# Patient Record
Sex: FEMALE | Race: WHITE | NOT HISPANIC OR LATINO | Employment: OTHER | URBAN - METROPOLITAN AREA
[De-identification: names, ages, dates, MRNs, and addresses within clinical notes are randomized per-mention and may not be internally consistent; named-entity substitution may affect disease eponyms.]

---

## 2017-01-10 ENCOUNTER — MYC MEDICAL ADVICE (OUTPATIENT)
Dept: FAMILY MEDICINE | Facility: CLINIC | Age: 52
End: 2017-01-10

## 2017-01-10 DIAGNOSIS — N95.0 POST-MENOPAUSAL BLEEDING: Primary | ICD-10-CM

## 2017-01-16 ENCOUNTER — HOSPITAL ENCOUNTER (OUTPATIENT)
Dept: ULTRASOUND IMAGING | Facility: CLINIC | Age: 52
Discharge: HOME | End: 2017-01-16
Attending: FAMILY MEDICINE | Admitting: FAMILY MEDICINE
Payer: COMMERCIAL

## 2017-01-16 DIAGNOSIS — N95.0 POST-MENOPAUSAL BLEEDING: ICD-10-CM

## 2017-01-16 PROCEDURE — 76830 TRANSVAGINAL US NON-OB: CPT

## 2017-01-30 ENCOUNTER — OFFICE VISIT (OUTPATIENT)
Dept: FAMILY MEDICINE | Facility: CLINIC | Age: 52
End: 2017-01-30
Payer: COMMERCIAL

## 2017-01-30 VITALS
HEIGHT: 65 IN | BODY MASS INDEX: 31.46 KG/M2 | DIASTOLIC BLOOD PRESSURE: 64 MMHG | WEIGHT: 188.8 LBS | HEART RATE: 80 BPM | SYSTOLIC BLOOD PRESSURE: 112 MMHG | TEMPERATURE: 98.1 F

## 2017-01-30 DIAGNOSIS — N95.0 POST-MENOPAUSE BLEEDING: Primary | ICD-10-CM

## 2017-01-30 PROCEDURE — 99214 OFFICE O/P EST MOD 30 MIN: CPT | Performed by: FAMILY MEDICINE

## 2017-01-30 NOTE — PROGRESS NOTES
"  SUBJECTIVE:                                                    Shaista Villar is a 51 year old female who presents to clinic today for the following health issues:      Vaginal Bleeding (Dysmenorrhea)      Onset: 1/9/2017    Description:  Duration of bleeding episodes: 1  Frequency between periods:  1 year  Describe bleeding/flow:   Clots: no  Number of pads/hour: normal period  Cramping: moderate    Intensity:  moderate    Accompanying signs and symptoms: heavy bleeding, lasted 5 days    History (similar episodes/previous evaluation): US completed    Precipitating or alleviating factors: None    Therapies tried and outcome: None    Had period after no period for almost one year. The period was normal and not heavy she did have US that was normal     Discussed EMB               Problem list and histories reviewed & adjusted, as indicated.  Additional history: as documented    Problem list, Medication list, Allergies, and Medical/Social/Surgical histories reviewed in EPIC and updated as appropriate.    ROS:  Constitutional, HEENT, cardiovascular, pulmonary, gi and gu systems are negative, except as otherwise noted.    OBJECTIVE:                                                    /64 mmHg  Pulse 80  Temp(Src) 98.1  F (36.7  C) (Tympanic)  Ht 5' 4.75\" (1.645 m)  Wt 188 lb 12.8 oz (85.639 kg)  BMI 31.65 kg/m2  LMP 03/25/2016  Breastfeeding? No  Body mass index is 31.65 kg/(m^2).  GENERAL APPEARANCE: healthy, alert and no distress   (female): normal cervix, adnexae, and uterus without masses or discharge and attempted to do EMB And cervical os stenotic   PSYCH: mentation appears normal and affect normal/bright         ASSESSMENT/PLAN:                                                    1. Post-menopause bleeding    - US Pelvic Complete w Transvaginal; Future    Long discussion regarding EMB or not and will instead watch and she will repeat US in 3 months but if any pain or more bleeding she will let me " know     Patient Instructions   Set up an US in April     Let me know if any bleeding between now and then    You may have some spotting today and that would be fine       Risks, benefits, side effects and rationale for treatment plan fully discussed with the patient and understanding expressed.   Deanna Cagle MD  [facility]

## 2017-01-30 NOTE — NURSING NOTE
"Chief Complaint   Patient presents with     Abnormal Uterine Bleeding       Initial /64 mmHg  Pulse 80  Temp(Src) 98.1  F (36.7  C) (Tympanic)  Ht 5' 4.75\" (1.645 m)  Wt 188 lb 12.8 oz (85.639 kg)  BMI 31.65 kg/m2  LMP 03/25/2016  Breastfeeding? No Estimated body mass index is 31.65 kg/(m^2) as calculated from the following:    Height as of this encounter: 5' 4.75\" (1.645 m).    Weight as of this encounter: 188 lb 12.8 oz (85.639 kg).  BP completed using cuff size: large    "

## 2017-01-30 NOTE — PATIENT INSTRUCTIONS
Set up an US in April     Let me know if any bleeding between now and then    You may have some spotting today and that would be fine

## 2017-01-30 NOTE — MR AVS SNAPSHOT
"              After Visit Summary   1/30/2017    Shaista Villar    MRN: [id number]           Patient Information     Date Of Birth          1965        Visit Information        Provider Department      1/30/2017 9:00 AM Deanna Cagle MD [facility]        Care Instructions    Set up an US in April     Let me know if any bleeding between now and then    You may have some spotting today and that would be fine         Follow-ups after your visit        Who to contact     If you have questions or need follow up information about today's clinic visit or your schedule please contact [facility] directly at [phone].  Normal or non-critical lab and imaging results will be communicated to you by MyChart, letter or phone within 4 business days after the clinic has received the results. If you do not hear from us within 7 days, please contact the clinic through Yugmahart or phone. If you have a critical or abnormal lab result, we will notify you by phone as soon as possible.  Submit refill requests through [email or web address] or call your pharmacy and they will forward the refill request to us. Please allow 3 business days for your refill to be completed.          Additional Information About Your Visit        MyChart Information     [email or web address] gives you secure access to your electronic health record. If you see a primary care provider, you can also send messages to your care team and make appointments. If you have questions, please call your primary care clinic.  If you do not have a primary care provider, please call [phone] and they will assist you.        Care EveryWhere ID     This is your Care EveryWhere ID. This could be used by other organizations to access your [facility] medical records  [id number]        Your Vitals Were     Pulse Temperature Height BMI (Body Mass Index) Last Period Breastfeeding?    80 98.1  F (36.7  C) (Tympanic) 5' 4.75\" (1.645 m) 31.65 kg/m2 03/25/2016 " No       Blood Pressure from Last 3 Encounters:   01/30/17 112/64   06/27/16 138/87   03/29/16 147/97    Weight from Last 3 Encounters:   01/30/17 188 lb 12.8 oz (85.639 kg)   06/27/16 181 lb (82.101 kg)   03/29/16 185 lb 9.6 oz (84.188 kg)              Today, you had the following     No orders found for display       Primary Care Provider Office Phone # Fax #    Deanna Cagle [phone]       [facility] [address] [facility] [address]        Thank you!     Thank you for choosing [facility]  for your care. Our goal is always to provide you with excellent care. Hearing back from our patients is one way we can continue to improve our services. Please take a few minutes to complete the written survey that you may receive in the mail after your visit with us. Thank you!             Your Updated Medication List - Protect others around you: Learn how to safely use, store and throw away your medicines at www.disposemymeds.org.          This list is accurate as of: 1/30/17 10:22 AM.  Always use your most recent med list.                   Brand Name Dispense Instructions for use    albuterol 108 (90 BASE) MCG/ACT Inhaler    PROAIR HFA/PROVENTIL HFA/VENTOLIN HFA    3 Inhaler    Inhale 2 puffs into the lungs every 4 hours as needed for shortness of breath / dyspnea       calcium-magnesium 500-250 MG Tabs per tablet    CALMAG     2 TABLETS DAILY       DHA-EPA-Coenzyme Q10-Vitamin E 476-117-57-30 Caps      Take  by mouth.       MULTIVITAMIN PO      one daily       vitamin B complex with vitamin C Tabs tablet    STRESS TAB     one daily       Vitamin C 100 MG Tabs      prn       Vitamin D (Cholecalciferol) 1000 UNITS Tabs      Take 1 tablet by mouth daily       Zinc 10 MG Tabs      prn       ZYRTEC PO      Take  by mouth.

## 2017-04-10 ENCOUNTER — HOSPITAL ENCOUNTER (OUTPATIENT)
Dept: ULTRASOUND IMAGING | Facility: CLINIC | Age: 52
Discharge: HOME | End: 2017-04-10
Attending: FAMILY MEDICINE | Admitting: FAMILY MEDICINE
Payer: COMMERCIAL

## 2017-04-10 DIAGNOSIS — N95.0 POST-MENOPAUSE BLEEDING: ICD-10-CM

## 2017-04-10 PROCEDURE — 76830 TRANSVAGINAL US NON-OB: CPT

## 2017-04-11 ENCOUNTER — MYC MEDICAL ADVICE (OUTPATIENT)
Dept: FAMILY MEDICINE | Facility: CLINIC | Age: 52
End: 2017-04-11

## 2017-07-24 ENCOUNTER — OFFICE VISIT (OUTPATIENT)
Dept: FAMILY MEDICINE | Facility: CLINIC | Age: 52
End: 2017-07-24
Payer: COMMERCIAL

## 2017-07-24 VITALS
BODY MASS INDEX: 29.99 KG/M2 | TEMPERATURE: 98.4 F | HEART RATE: 76 BPM | SYSTOLIC BLOOD PRESSURE: 134 MMHG | WEIGHT: 180 LBS | DIASTOLIC BLOOD PRESSURE: 84 MMHG | HEIGHT: 65 IN

## 2017-07-24 DIAGNOSIS — J45.20 MILD INTERMITTENT ASTHMA WITHOUT COMPLICATION: ICD-10-CM

## 2017-07-24 DIAGNOSIS — Z00.00 ENCOUNTER FOR WELL ADULT EXAM WITHOUT ABNORMAL FINDINGS: Primary | ICD-10-CM

## 2017-07-24 DIAGNOSIS — J30.2 CHRONIC SEASONAL ALLERGIC RHINITIS DUE TO OTHER ALLERGEN: ICD-10-CM

## 2017-07-24 LAB
ANION GAP SERPL CALCULATED.3IONS-SCNC: 5 MMOL/L (ref 3–14)
BUN SERPL-MCNC: 11 MG/DL (ref 7–30)
CALCIUM SERPL-MCNC: 9.2 MG/DL (ref 8.5–10.1)
CHLORIDE SERPL-SCNC: 101 MMOL/L (ref 94–109)
CHOLEST SERPL-MCNC: 204 MG/DL
CO2 SERPL-SCNC: 29 MMOL/L (ref 20–32)
CREAT SERPL-MCNC: 0.67 MG/DL (ref 0.52–1.04)
GFR SERPL CREATININE-BSD FRML MDRD: NORMAL ML/MIN/1.7M2
GLUCOSE SERPL-MCNC: 85 MG/DL (ref 70–99)
HDLC SERPL-MCNC: 84 MG/DL
LDLC SERPL CALC-MCNC: 110 MG/DL
NONHDLC SERPL-MCNC: 120 MG/DL
POTASSIUM SERPL-SCNC: 4.2 MMOL/L (ref 3.4–5.3)
SODIUM SERPL-SCNC: 135 MMOL/L (ref 133–144)
TRIGL SERPL-MCNC: 49 MG/DL

## 2017-07-24 PROCEDURE — 36415 COLL VENOUS BLD VENIPUNCTURE: CPT | Performed by: FAMILY MEDICINE

## 2017-07-24 PROCEDURE — 80061 LIPID PANEL: CPT | Performed by: FAMILY MEDICINE

## 2017-07-24 PROCEDURE — 80048 BASIC METABOLIC PNL TOTAL CA: CPT | Performed by: FAMILY MEDICINE

## 2017-07-24 PROCEDURE — 99396 PREV VISIT EST AGE 40-64: CPT | Performed by: FAMILY MEDICINE

## 2017-07-24 RX ORDER — CETIRIZINE HYDROCHLORIDE, PSEUDOEPHEDRINE HYDROCHLORIDE 5; 120 MG/1; MG/1
1 TABLET, FILM COATED, EXTENDED RELEASE ORAL DAILY
COMMUNITY
End: 2020-02-24

## 2017-07-24 RX ORDER — ALBUTEROL SULFATE 90 UG/1
2 AEROSOL, METERED RESPIRATORY (INHALATION) EVERY 4 HOURS PRN
Qty: 3 INHALER | Refills: 1 | Status: SHIPPED | OUTPATIENT
Start: 2017-07-24 | End: 2018-07-20

## 2017-07-24 NOTE — MR AVS SNAPSHOT
After Visit Summary   7/24/2017    Shaista Villar    MRN: [id number]           Patient Information     Date Of Birth          1965        Visit Information        Provider Department      7/24/2017 8:40 AM Deanna Cagle MD [facility]        Today's Diagnoses     Encounter for well adult exam without abnormal findings    -  1    Chronic seasonal allergic rhinitis due to other allergen        Mild intermittent asthma without complication          Care Instructions      Preventive Health Recommendations  Female Ages 50 - 64    Yearly exam: See your health care provider every year in order to  o Review health changes.   o Discuss preventive care.    o Review your medicines if your doctor has prescribed any.      Get a Pap test every three years (unless you have an abnormal result and your provider advises testing more often).    If you get Pap tests with HPV test, you only need to test every 5 years, unless you have an abnormal result.     You do not need a Pap test if your uterus was removed (hysterectomy) and you have not had cancer.    You should be tested each year for STDs (sexually transmitted diseases) if you're at risk.     Have a mammogram every 1 to 2 years.    Have a colonoscopy at age 50, or have a yearly FIT test (stool test). These exams screen for colon cancer.      Have a cholesterol test every 5 years, or more often if advised.    Have a diabetes test (fasting glucose) every three years. If you are at risk for diabetes, you should have this test more often.     If you are at risk for osteoporosis (brittle bone disease), think about having a bone density scan (DEXA).    Shots: Get a flu shot each year. Get a tetanus shot every 10 years.    Nutrition:     Eat at least 5 servings of fruits and vegetables each day.    Eat whole-grain bread, whole-wheat pasta and brown rice instead of white grains and rice.    Talk to your provider about Calcium and Vitamin D.  "    Lifestyle    Exercise at least 150 minutes a week (30 minutes a day, 5 days a week). This will help you control your weight and prevent disease.    Limit alcohol to one drink per day.    No smoking.     Wear sunscreen to prevent skin cancer.     See your dentist every six months for an exam and cleaning.    See your eye doctor every 1 to 2 years.    Labs today     Consider ASA baby if cholesterol is up     See me back in one year     Albuterol refilled             Follow-ups after your visit        Your next 10 appointments already scheduled     Aug 02, 2017  8:00 AM CDT   MA SCREENING DIGITAL BILATERAL with W[facility]MA2   [facility] ([facility])    [address] [place]  [facility] [address]   [phone]           Do not use any powder, lotion or deodorant under your arms or on your breast. If you do, we will ask you to remove it before your exam.  Wear comfortable, two-piece clothing.  If you have any allergies, tell your care team.  Bring any previous mammograms from other facilities or have them mailed to the breast center. Three-dimensional (3D) mammograms are available at [place] locations in [facility], and [place]. [facility] locations include [facility] in [place]. Benefits of 3D mammograms include: - Improved rate of cancer detection - Decreases your chance of having to go back for more tests, which means fewer: - \"False-positive\" results (This means that there is an abnormal area but it isn't cancer.) - Invasive testing procedures, such as a biopsy or surgery - Can provide clearer images of the breast if you have dense breast tissue. 3D mammography is an optional exam that anyone can have with a 2D mammogram. It doesn't replace or take the place of a 2D mammogram. 2D mammograms remain an effective screening test for all women.  Not all insurance companies cover the cost of a 3D mammogram. Check with " "your insurance.              Who to contact     If you have questions or need follow up information about today's clinic visit or your schedule please contact [facility] directly at [phone].  Normal or non-critical lab and imaging results will be communicated to you by MyChart, letter or phone within 4 business days after the clinic has received the results. If you do not hear from us within 7 days, please contact the clinic through MyChart or phone. If you have a critical or abnormal lab result, we will notify you by phone as soon as possible.  Submit refill requests through [email or web address] or call your pharmacy and they will forward the refill request to us. Please allow 3 business days for your refill to be completed.          Additional Information About Your Visit        NCRhart Information     [email or web address] gives you secure access to your electronic health record. If you see a primary care provider, you can also send messages to your care team and make appointments. If you have questions, please call your primary care clinic.  If you do not have a primary care provider, please call [phone] and they will assist you.        Care EveryWhere ID     This is your Care EveryWhere ID. This could be used by other organizations to access your [facility] medical records  [id number]        Your Vitals Were     Pulse Temperature Height Last Period BMI (Body Mass Index)       76 98.4  F (36.9  C) (Tympanic) 5' 4.75\" (1.645 m) 03/25/2016 30.19 kg/m2        Blood Pressure from Last 3 Encounters:   07/24/17 134/84   01/30/17 112/64   06/27/16 138/87    Weight from Last 3 Encounters:   07/24/17 180 lb (81.6 kg)   01/30/17 188 lb 12.8 oz (85.6 kg)   06/27/16 181 lb (82.1 kg)              We Performed the Following     Asthma Action Plan (AAP)          Where to get your medicines      Some of these will need a paper prescription and others can be bought over the counter.  Ask your nurse if you have questions.     " Bring a paper prescription for each of these medications     albuterol 108 (90 BASE) MCG/ACT Inhaler          Primary Care Provider Office Phone # Fax #    Deanna Cagle [phone]       [facility] [address] [facility] [address]        Equal Access to Services     CLAUDE ARANDA : Hadii aad ku hadasho Soomaali, waaxda luqadaha, qaybta kaalmada adeegyada, fany sierra leobardon leann melendezferchochichi todd. So [facility] [phone].    ATENCIÓN: Si habla español, tiene a hernandez disposición servicios gratuitos de asistencia lingüística. [facility] [phone].    We comply with applicable federal civil rights laws and [place] laws. We do not discriminate on the basis of race, color, national origin, age, disability sex, sexual orientation or gender identity.            Thank you!     Thank you for choosing [facility]  for your care. Our goal is always to provide you with excellent care. Hearing back from our patients is one way we can continue to improve our services. Please take a few minutes to complete the written survey that you may receive in the mail after your visit with us. Thank you!             Your Updated Medication List - Protect others around you: Learn how to safely use, store and throw away your medicines at www.disposemymeds.org.          This list is accurate as of: 7/24/17  9:09 AM.  Always use your most recent med list.                   Brand Name Dispense Instructions for use Diagnosis    albuterol 108 (90 BASE) MCG/ACT Inhaler    PROAIR HFA/PROVENTIL HFA/VENTOLIN HFA    3 Inhaler    Inhale 2 puffs into the lungs every 4 hours as needed for shortness of breath / dyspnea    Mild intermittent asthma without complication       calcium-magnesium 500-250 MG Tabs per tablet    CALMAG     2 TABLETS DAILY        cetirizine-psuedoePHEDrine 5-120 MG per 12 hr tablet    [id number]     Take 1 tablet by mouth daily        DHA-EPA-Coenzyme Q10-Vitamin E  [id number] Caps      Take  by mouth.        MULTIVITAMIN PO      one daily        vitamin B complex with vitamin C Tabs tablet    STRESS TAB     one daily        Vitamin C 100 MG Tabs      prn        Vitamin D (Cholecalciferol) 1000 UNITS Tabs      Take 1 tablet by mouth daily        Zinc 10 MG Tabs      prn

## 2017-07-24 NOTE — NURSING NOTE
"Chief Complaint   Patient presents with     Physical       Initial /84 (BP Location: Right arm, Patient Position: Chair, Cuff Size: Adult Large)  Pulse 76  Temp 98.4  F (36.9  C) (Tympanic)  Ht 5' 4.75\" (1.645 m)  Wt 180 lb (81.6 kg)  LMP 03/25/2016  BMI 30.19 kg/m2 Estimated body mass index is 30.19 kg/(m^2) as calculated from the following:    Height as of this encounter: 5' 4.75\" (1.645 m).    Weight as of this encounter: 180 lb (81.6 kg).  Medication Reconciliation: complete    Health Maintenance that is potentially due pending provider review:  ACT    Possibly completing today per provider review.    Is there anyone who you would like to be able to receive your results? Yes  If yes have patient fill out ROSALINDA    "

## 2017-07-24 NOTE — PATIENT INSTRUCTIONS

## 2017-07-24 NOTE — LETTER
My Asthma Action Plan  Name: Shaista Villar   YOB: 1965  Date: 7/24/2017   My doctor: Deanna Cagle MD   My clinic: [facility]        My Control Medicine: none  My Rescue Medicine: Albuterol (Proair/Ventolin/Proventil) inhaler as needed   My Asthma Severity: intermittent  Avoid your asthma triggers              GREEN ZONE   Good Control    I feel good    No cough or wheeze    Can work, sleep and play without asthma symptoms       Take your asthma control medicine every day.     1. If exercise triggers your asthma, take your rescue medication    15 minutes before exercise or sports, and    During exercise if you have asthma symptoms  2. Spacer to use with inhaler: If you have a spacer, make sure to use it with your inhaler             YELLOW ZONE Getting Worse  I have ANY of these:    I do not feel good    Cough or wheeze    Chest feels tight    Wake up at night   1. Keep taking your Green Zone medications  2. Start taking your rescue medicine:    every 20 minutes for up to 1 hour. Then every 4 hours for 24-48 hours.  3. If you stay in the Yellow Zone for more than 12-24 hours, contact your doctor.  4. If you do not return to the Green Zone in 12-24 hours or you get worse, start taking your oral steroid medicine if prescribed by your provider.           RED ZONE Medical Alert - Get Help  I have ANY of these:    I feel awful    Medicine is not helping    Breathing getting harder    Trouble walking or talking    Nose opens wide to breathe       1. Take your rescue medicine NOW  2. If your provider has prescribed an oral steroid medicine, start taking it NOW  3. Call your doctor NOW  4. If you are still in the Red Zone after 20 minutes and you have not reached your doctor:    Take your rescue medicine again and    Call 911 or go to the emergency room right away    See your regular doctor within 2 weeks of an Emergency Room or Urgent Care visit for follow-up treatment.         Electronically signed by: Karla Greenfield, July 24, 2017    Annual Reminders:  Meet with Asthma Educator,  Flu Shot in the Fall, consider Pneumonia Vaccination for patients with asthma (aged 19 and older).    Pharmacy:    [facility] [address] [place] [facility] - [address]  [place] - MAIL ORDER MAINT MEDS - NON-EPRESCRIBE  [facility] - [address]                    Asthma Triggers  How To Control Things That Make Your Asthma Worse    Triggers are things that make your asthma worse.  Look at the list below to help you find your triggers and what you can do about them.  You can help prevent asthma flare-ups by staying away from your triggers.      Trigger                                                          What you can do   Cigarette Smoke  Tobacco smoke can make asthma worse. Do not allow smoking in your home, car or around you.  Be sure no one smokes at a child s day care or school.  If you smoke, ask your health care provider for ways to help you quit.  Ask family members to quit too.  Ask your health care provider for a referral to Quit Plan to help you quit smoking, or call 0-384-544-PLAN.     Colds, Flu, Bronchitis  These are common triggers of asthma. Wash your hands often.  Don t touch your eyes, nose or mouth.  Get a flu shot every year.     Dust Mites  These are tiny bugs that live in cloth or carpet. They are too small to see. Wash sheets and blankets in hot water every week.   Encase pillows and mattress in dust mite proof covers.  Avoid having carpet if you can. If you have carpet, vacuum weekly.   Use a dust mask and HEPA vacuum.   Pollen and Outdoor Mold  Some people are allergic to trees, grass, or weed pollen, or molds. Try to keep your windows closed.  Limit time out doors when pollen count is high.   Ask you health care provider about taking medicine during allergy season.     Animal Dander  Some people are allergic to skin flakes, urine or  saliva from pets with fur or feathers. Keep pets with fur or feathers out of your home.    If you can t keep the pet outdoors, then keep the pet out of your bedroom.  Keep the bedroom door closed.  Keep pets off cloth furniture and away from stuffed toys.     Mice, Rats, and Cockroaches  Some people are allergic to the waste from these pests.   Cover food and garbage.  Clean up spills and food crumbs.  Store grease in the refrigerator.   Keep food out of the bedroom.   Indoor Mold  This can be a trigger if your home has high moisture. Fix leaking faucets, pipes, or other sources of water.   Clean moldy surfaces.  Dehumidify basement if it is damp and smelly.   Smoke, Strong Odors, and Sprays  These can reduce air quality. Stay away from strong odors and sprays, such as perfume, powder, hair spray, paints, smoke incense, paint, cleaning products, candles and new carpet.   Exercise or Sports  Some people with asthma have this trigger. Be active!  Ask your doctor about taking medicine before sports or exercise to prevent symptoms.    Warm up for 5-10 minutes before and after sports or exercise.     Other Triggers of Asthma  Cold air:  Cover your nose and mouth with a scarf.  Sometimes laughing or crying can be a trigger.  Some medicines and food can trigger asthma.

## 2017-07-24 NOTE — PROGRESS NOTES
SUBJECTIVE:   CC: Shaista Villar is an 51 year old woman who presents for preventive health visit.   The 10-year ASCVD risk score (Maggidarius MAZA Jr, et al., 2013) is: 0.9%    Values used to calculate the score:      Age: 51 years      Sex: Female      Is Non- : No      Diabetic: No      Tobacco smoker: No      Systolic Blood Pressure: 134 mmHg      Is BP treated: No      HDL Cholesterol: 87 mg/dL      Total Cholesterol: 171 mg/dL     Patient is eligible for use of low-dose aspirin for primary prevention of heart attack and stroke.  Provider has discussed aspirin with patient and our decision was:     Pt will think about this not sure she wants to start ASA         Answers for HPI/ROS submitted by the patient on 7/21/2017   Annual Exam:  Getting at least 3 servings of Calcium per day:: Yes  Bi-annual eye exam:: Yes  Dental care twice a year:: Yes  Sleep apnea or symptoms of sleep apnea:: None  Diet:: Regular (no restrictions)  Frequency of exercise:: 4-5 days/week  Taking medications regularly:: Yes  Additional concerns today:: YES  PHQ-2 Score: 0  Duration of exercise:: 15-30 minutes          Asthma Follow-Up    Was ACT completed today?    Yes    ACT Total Scores 6/27/2016   ACT TOTAL SCORE -   ASTHMA ER VISITS -   ASTHMA HOSPITALIZATIONS -   ACT TOTAL SCORE (Goal Greater than or Equal to 20) 23   In the past 12 months, how many times did you visit the emergency room for your asthma without being admitted to the hospital? 0   In the past 12 months, how many times were you hospitalized overnight because of your asthma? 0       Recent asthma triggers that patient is dealing with: pollens              Today's PHQ-2 Score:   PHQ-2 ( 1999 Pfizer) 7/21/2017 1/30/2017   Q1: Little interest or pleasure in doing things 0 0   Q2: Feeling down, depressed or hopeless 0 0   PHQ-2 Score 0 0   Q1: Little interest or pleasure in doing things Not at all -   Q2: Feeling down, depressed or hopeless Not at all -  "  PHQ-2 Score 0 -         Abuse: Current or Past(Physical, Sexual or Emotional)- No  Do you feel safe in your environment - Yes  Social History   Substance Use Topics     Smoking status: Never Smoker     Smokeless tobacco: Never Used     Alcohol use Yes      Comment: occas     The patient does not drink >3 drinks per day nor >7 drinks per week.    Reviewed orders with patient.  Reviewed health maintenance and updated orders accordingly - Yes  Labs reviewed in [facility]    Patient over age 50, mutual decision to screen reflected in health maintenance.      Pertinent mammograms are reviewed under the imaging tab.  History of abnormal Pap smear:   NO - age 30- 65 PAP every 3 years recommended  Last 3 Pap Results:   PAP (no units)   Date Value   06/12/2015 NIL   05/29/2012 NIL   05/26/2011 NIL       Reviewed and updated as needed this visit by clinical staff  Tobacco  Allergies  Meds  Problems         Reviewed and updated as needed this visit by Provider  Allergies  Meds  Problems              ROS:  C: NEGATIVE for fever, chills, change in weight  I: NEGATIVE for worrisome rashes, moles or lesions  E: NEGATIVE for vision changes or irritation  ENT: NEGATIVE for ear, mouth and throat problems  R: NEGATIVE for significant cough or SOB  B: NEGATIVE for masses, tenderness or discharge  CV: NEGATIVE for chest pain, palpitations or peripheral edema  GI: NEGATIVE for nausea, abdominal pain, heartburn, or change in bowel habits  : NEGATIVE for unusual urinary or vaginal symptoms. No vaginal bleeding.  M: NEGATIVE for significant arthralgias or myalgia  N: NEGATIVE for weakness, dizziness or paresthesias  P: NEGATIVE for changes in mood or affect     OBJECTIVE:   /84 (BP Location: Right arm, Patient Position: Chair, Cuff Size: Adult Large)  Pulse 76  Temp 98.4  F (36.9  C) (Tympanic)  Ht 5' 4.75\" (1.645 m)  Wt 180 lb (81.6 kg)  LMP 03/25/2016  BMI 30.19 kg/m2  EXAM:  GENERAL: healthy, alert and no distress  EYES: " "Eyes grossly normal to inspection, PERRL and conjunctivae and sclerae normal  HENT: ear canals and TM's normal, nose and mouth without ulcers or lesions  NECK: no adenopathy, no asymmetry, masses, or scars and thyroid normal to palpation  RESP: lungs clear to auscultation - no rales, rhonchi or wheezes  BREAST: normal without masses, tenderness or nipple discharge and no palpable axillary masses or adenopathy  CV: regular rate and rhythm, normal S1 S2, no S3 or S4, no murmur, click or rub, no peripheral edema and peripheral pulses strong  ABDOMEN: soft, nontender, no hepatosplenomegaly, no masses and bowel sounds normal  MS: no gross musculoskeletal defects noted, no edema  SKIN: no suspicious lesions or rashes  NEURO: Normal strength and tone, mentation intact and speech normal  PSYCH: mentation appears normal, affect normal/bright    ASSESSMENT/PLAN:   1. Encounter for well adult exam without abnormal findings      2. Chronic seasonal allergic rhinitis due to other allergen  Stable no change in treatment plan.     3. Mild intermittent asthma without complication  Stable no change in treatment plan.   - albuterol (PROAIR HFA/PROVENTIL HFA/VENTOLIN HFA) 108 (90 BASE) MCG/ACT Inhaler; Inhale 2 puffs into the lungs every 4 hours as needed for shortness of breath / dyspnea  Dispense: 3 Inhaler; Refill: 1    COUNSELING:   Reviewed preventive health counseling, as reflected in patient instructions         reports that she has never smoked. She has never used smokeless tobacco.    Estimated body mass index is 30.19 kg/(m^2) as calculated from the following:    Height as of this encounter: 5' 4.75\" (1.645 m).    Weight as of this encounter: 180 lb (81.6 kg).   Weight management plan: Discussed healthy diet and exercise guidelines and patient will follow up in 12 months in clinic to re-evaluate.    Counseling Resources:  ATP IV Guidelines  Pooled Cohorts Equation Calculator  Breast Cancer Risk Calculator  FRAX Risk " Assessment  ICSI Preventive Guidelines  Dietary Guidelines for Americans, 2010  USDA's MyPlate  ASA Prophylaxis  Lung CA Screening    Deanna Cagle MD  [facility]

## 2017-07-25 ASSESSMENT — ASTHMA QUESTIONNAIRES: ACT_TOTALSCORE: 23

## 2017-08-02 ENCOUNTER — HOSPITAL ENCOUNTER (OUTPATIENT)
Dept: MAMMOGRAPHY | Facility: CLINIC | Age: 52
Discharge: HOME | End: 2017-08-02
Attending: FAMILY MEDICINE | Admitting: FAMILY MEDICINE
Payer: COMMERCIAL

## 2017-08-02 DIAGNOSIS — Z12.31 VISIT FOR SCREENING MAMMOGRAM: ICD-10-CM

## 2017-08-02 PROCEDURE — G0202 SCR MAMMO BI INCL CAD: HCPCS

## 2017-11-02 ENCOUNTER — MYC MEDICAL ADVICE (OUTPATIENT)
Dept: FAMILY MEDICINE | Facility: CLINIC | Age: 52
End: 2017-11-02

## 2018-07-20 ENCOUNTER — OFFICE VISIT (OUTPATIENT)
Dept: FAMILY MEDICINE | Facility: CLINIC | Age: 53
End: 2018-07-20
Payer: COMMERCIAL

## 2018-07-20 VITALS
SYSTOLIC BLOOD PRESSURE: 110 MMHG | HEART RATE: 76 BPM | RESPIRATION RATE: 16 BRPM | DIASTOLIC BLOOD PRESSURE: 76 MMHG | TEMPERATURE: 97.3 F | BODY MASS INDEX: 28.29 KG/M2 | HEIGHT: 65 IN | WEIGHT: 169.8 LBS

## 2018-07-20 DIAGNOSIS — D12.5 ADENOMATOUS POLYP OF SIGMOID COLON: ICD-10-CM

## 2018-07-20 DIAGNOSIS — Z00.00 ENCOUNTER FOR WELL ADULT EXAM WITHOUT ABNORMAL FINDINGS: Primary | ICD-10-CM

## 2018-07-20 DIAGNOSIS — J45.20 MILD INTERMITTENT ASTHMA WITHOUT COMPLICATION: ICD-10-CM

## 2018-07-20 PROCEDURE — 87624 HPV HI-RISK TYP POOLED RSLT: CPT | Performed by: FAMILY MEDICINE

## 2018-07-20 PROCEDURE — 99396 PREV VISIT EST AGE 40-64: CPT | Performed by: FAMILY MEDICINE

## 2018-07-20 PROCEDURE — G0145 SCR C/V CYTO,THINLAYER,RESCR: HCPCS | Performed by: FAMILY MEDICINE

## 2018-07-20 RX ORDER — ALBUTEROL SULFATE 90 UG/1
2 AEROSOL, METERED RESPIRATORY (INHALATION) EVERY 4 HOURS PRN
Qty: 3 INHALER | Refills: 1 | Status: SHIPPED | OUTPATIENT
Start: 2018-07-20 | End: 2019-07-22

## 2018-07-20 ASSESSMENT — PAIN SCALES - GENERAL: PAINLEVEL: NO PAIN (0)

## 2018-07-20 NOTE — PATIENT INSTRUCTIONS
Preventive Health Recommendations  Female Ages 50 - 64    Yearly exam: See your health care provider every year in order to  o Review health changes.   o Discuss preventive care.    o Review your medicines if your doctor has prescribed any.      Get a Pap test every three years (unless you have an abnormal result and your provider advises testing more often).    If you get Pap tests with HPV test, you only need to test every 5 years, unless you have an abnormal result.     You do not need a Pap test if your uterus was removed (hysterectomy) and you have not had cancer.    You should be tested each year for STDs (sexually transmitted diseases) if you're at risk.     Have a mammogram every 1 to 2 years.    Have a colonoscopy at age 50, or have a yearly FIT test (stool test). These exams screen for colon cancer.      Have a cholesterol test every 5 years, or more often if advised.    Have a diabetes test (fasting glucose) every three years. If you are at risk for diabetes, you should have this test more often.     If you are at risk for osteoporosis (brittle bone disease), think about having a bone density scan (DEXA).    Shots: Get a flu shot each year. Get a tetanus shot every 10 years.    Nutrition:     Eat at least 5 servings of fruits and vegetables each day.    Eat whole-grain bread, whole-wheat pasta and brown rice instead of white grains and rice.    Get adequate Calcium and Vitamin D.     Lifestyle    Exercise at least 150 minutes a week (30 minutes a day, 5 days a week). This will help you control your weight and prevent disease.    Limit alcohol to one drink per day.    No smoking.     Wear sunscreen to prevent skin cancer.     See your dentist every six months for an exam and cleaning.    See your eye doctor every 1 to 2 years.      [place] [facility] Schedule   [place]- [phone]  2nd/4th Monday morning   Every other Wednesday afternoon   [place]- [phone]  2nd/4th Wednesday morning  [facility]  [phone]  1st/3rd Wednesday morning   [facility]- [phone]  2nd/4th Monday afternoon   Every other Wednesday afternoon   [place]- [phone]  Every Monday morning   Every Tuesday afternoon   Every Wednesday, Thursday, Friday   Mammogram walk-in hours in [place]: Monday Friday, 8 a.m. - 4 p.m.  Questions? Call [phone].

## 2018-07-20 NOTE — NURSING NOTE
"Chief Complaint   Patient presents with     Physical       Initial /76 (BP Location: Right arm, Patient Position: Chair, Cuff Size: Adult Regular)  Pulse 76  Temp 97.3  F (36.3  C) (Tympanic)  Resp 16  Ht 5' 4.75\" (1.645 m)  Wt 169 lb 12.8 oz (77 kg)  LMP 03/25/2016  BMI 28.47 kg/m2 Estimated body mass index is 28.47 kg/(m^2) as calculated from the following:    Height as of this encounter: 5' 4.75\" (1.645 m).    Weight as of this encounter: 169 lb 12.8 oz (77 kg).      Health Maintenance that is potentially due pending provider review:  Pap Smear    Possibly completing today per provider review.    Loly Prado MA  8:07 AM 7/20/2018      "

## 2018-07-20 NOTE — MR AVS SNAPSHOT
After Visit Summary   7/20/2018    Shaista Villar    MRN: [id number]           Patient Information     Date Of Birth          1965        Visit Information        Provider Department      7/20/2018 8:00 AM Deanna Cagle MD [facility]        Today's Diagnoses     Encounter for well adult exam without abnormal findings    -  1    Mild intermittent asthma without complication        Adenomatous polyp of sigmoid colon          Care Instructions      Preventive Health Recommendations  Female Ages 50 - 64    Yearly exam: See your health care provider every year in order to  o Review health changes.   o Discuss preventive care.    o Review your medicines if your doctor has prescribed any.      Get a Pap test every three years (unless you have an abnormal result and your provider advises testing more often).    If you get Pap tests with HPV test, you only need to test every 5 years, unless you have an abnormal result.     You do not need a Pap test if your uterus was removed (hysterectomy) and you have not had cancer.    You should be tested each year for STDs (sexually transmitted diseases) if you're at risk.     Have a mammogram every 1 to 2 years.    Have a colonoscopy at age 50, or have a yearly FIT test (stool test). These exams screen for colon cancer.      Have a cholesterol test every 5 years, or more often if advised.    Have a diabetes test (fasting glucose) every three years. If you are at risk for diabetes, you should have this test more often.     If you are at risk for osteoporosis (brittle bone disease), think about having a bone density scan (DEXA).    Shots: Get a flu shot each year. Get a tetanus shot every 10 years.    Nutrition:     Eat at least 5 servings of fruits and vegetables each day.    Eat whole-grain bread, whole-wheat pasta and brown rice instead of white grains and rice.    Get adequate Calcium and Vitamin D.     Lifestyle    Exercise at least 150  minutes a week (30 minutes a day, 5 days a week). This will help you control your weight and prevent disease.    Limit alcohol to one drink per day.    No smoking.     Wear sunscreen to prevent skin cancer.     See your dentist every six months for an exam and cleaning.    See your eye doctor every 1 to 2 years.      [place] [facility] Schedule   [place]- [phone]  2nd/4th Monday morning   Every other Wednesday afternoon   [place]- [phone]  2nd/4th Wednesday morning  [place]- [phone]  1st/3rd Wednesday morning   [facility] [phone]  2nd/4th Monday afternoon   Every other Wednesday afternoon   [place]- [phone]  Every Monday morning   Every Tuesday afternoon   Every Wednesday, Thursday, Friday   Mammogram walk-in hours in [place]: Monday-Friday, 8 a.m. - 4 p.m.  Questions? Call [phone].              Follow-ups after your visit        Who to contact     If you have questions or need follow up information about today's clinic visit or your schedule please contact [facility] directly at [phone].  Normal or non-critical lab and imaging results will be communicated to you by [employer], letter or phone within 4 business days after the clinic has received the results. If you do not hear from us within 7 days, please contact the clinic through [employer] or phone. If you have a critical or abnormal lab result, we will notify you by phone as soon as possible.  Submit refill requests through [employer] or call your pharmacy and they will forward the refill request to us. Please allow 3 business days for your refill to be completed.          Additional Information About Your Visit        [employer] Information     [employer] gives you secure access to your electronic health record. If you see a primary care provider, you can also send messages to your care team and make appointments. If you have questions, please call your primary care clinic.  If you do not have a primary care provider,  "please call [phone] and they will assist you.        Care EveryWhere ID     This is your Care EveryWhere ID. This could be used by other organizations to access your [facility] medical records  [id number]        Your Vitals Were     Pulse Temperature Respirations Height Last Period BMI (Body Mass Index)    76 97.3  F (36.3  C) (Tympanic) 16 5' 4.75\" (1.645 m) 03/25/2016 28.47 kg/m2       Blood Pressure from Last 3 Encounters:   07/20/18 110/76   07/24/17 134/84   01/30/17 112/64    Weight from Last 3 Encounters:   07/20/18 169 lb 12.8 oz (77 kg)   07/24/17 180 lb (81.6 kg)   01/30/17 188 lb 12.8 oz (85.6 kg)              We Performed the Following     HPV High Risk Types DNA Cervical     Pap imaged thin layer screen with HPV - recommended age 30 - 65          Where to get your medicines      These medications were sent to [facility] [address], [facility] [address]     Phone:  [phone]     albuterol 108 (90 Base) MCG/ACT Inhaler          Primary Care Provider Office Phone # Fax #    Deanna Cagle [phone] [address] [place] [address]        Equal Access to Services     BEBA ARANDA AH: Hadii aad ku hadasho Sojulianna, waaxda luqadaha, qaybta kaalmada fany kapadia. So [facility] [phone].    ATENCIÓN: Si habla español, tiene a hernandez disposición servicios gratuitos de asistencia lingüística. Llame al [phone].    We comply with applicable federal civil rights laws and [place] laws. We do not discriminate on the basis of race, color, national origin, age, disability, sex, sexual orientation, or gender identity.            Thank you!     Thank you for choosing [facility]  for your care. Our goal is always to provide you with excellent care. Hearing back from our patients is one way we can continue to improve our " services. Please take a few minutes to complete the written survey that you may receive in the mail after your visit with us. Thank you!             Your Updated Medication List - Protect others around you: Learn how to safely use, store and throw away your medicines at www.disposemymeds.org.          This list is accurate as of 7/20/18  8:27 AM.  Always use your most recent med list.                   Brand Name Dispense Instructions for use Diagnosis    albuterol 108 (90 Base) MCG/ACT Inhaler    PROAIR HFA/PROVENTIL HFA/VENTOLIN HFA    3 Inhaler    Inhale 2 puffs into the lungs every 4 hours as needed for shortness of breath / dyspnea    Mild intermittent asthma without complication       calcium-magnesium 500-250 MG Tabs per tablet    CALMAG     2 TABLETS DAILY        cetirizine-pseudoePHEDrine 5-120 MG per 12 hr tablet    [id number]     Take 1 tablet by mouth daily        DHA-EPA-Coenzyme Q10-Vitamin E [id number] Caps      Take  by mouth.        MULTIVITAMIN PO      one daily        vitamin B complex with vitamin C Tabs tablet    STRESS TAB     one daily        Vitamin C 100 MG Tabs      prn        Vitamin D (Cholecalciferol) 1000 units Tabs      Take 1 tablet by mouth daily        Zinc 10 MG Tabs      prn

## 2018-07-20 NOTE — PROGRESS NOTES
SUBJECTIVE:   CC: Shaista Villar is an 52 year old woman who presents for preventive health visit.     Healthy Habits:  Answers for HPI/ROS submitted by the patient on 7/18/2018   Annual Exam:  Getting at least 3 servings of Calcium per day:: Yes  Bi-annual eye exam:: Yes  Dental care twice a year:: Yes  Sleep apnea or symptoms of sleep apnea:: None  Diet:: Regular (no restrictions)  Frequency of exercise:: 2-3 days/week  Taking medications regularly:: Yes  Medication side effects:: None  Additional concerns today:: YES  PHQ-2 Score: 0  Duration of exercise:: 30-45 minutes    CONCERNS  1.) Check some skin spots on face      Today's PHQ-2 Score:   PHQ-2 ( 1999 Pfizer) 7/18/2018 7/21/2017   Q1: Little interest or pleasure in doing things 0 0   Q2: Feeling down, depressed or hopeless 0 0   PHQ-2 Score 0 0   Q1: Little interest or pleasure in doing things Not at all Not at all   Q2: Feeling down, depressed or hopeless Not at all Not at all   PHQ-2 Score 0 0       Abuse: Current or Past(Physical, Sexual or Emotional)- No  Do you feel safe in your environment - Yes    Social History   Substance Use Topics     Smoking status: Never Smoker     Smokeless tobacco: Never Used     Alcohol use Yes      Comment: occas     If you drink alcohol do you typically have >3 drinks per day or >7 drinks per week? No                     Reviewed orders with patient.  Reviewed health maintenance and updated orders accordingly - Yes  Labs reviewed in [facility]    Patient over age 50, mutual decision to screen reflected in health maintenance.    Pertinent mammograms are reviewed under the imaging tab.  History of abnormal Pap smear: NO - age 30- 65 PAP every 3 years recommended  PAP / HPV Latest Ref Rng & Units 6/12/2015 5/29/2012 5/26/2011   PAP - NIL NIL NIL   HPV 16 DNA NEG Negative - -   HPV 18 DNA NEG Negative - -   OTHER HR HPV NEG Negative - -     Reviewed and updated as needed this visit by clinical staff  Tobacco  Allergies  Meds   "Problems  Med Hx  Surg Hx  Fam Hx  Soc Hx          Reviewed and updated as needed this visit by Provider  Allergies  Meds  Problems            ROS:  CONSTITUTIONAL: NEGATIVE for fever, chills, change in weight  INTEGUMENTARY/SKIN: NEGATIVE for worrisome rashes, moles or lesions  EYES: NEGATIVE for vision changes or irritation  ENT: NEGATIVE for ear, mouth and throat problems  RESP: NEGATIVE for significant cough or SOB  BREAST: NEGATIVE for masses, tenderness or discharge  CV: NEGATIVE for chest pain, palpitations or peripheral edema  GI: NEGATIVE for nausea, abdominal pain, heartburn, or change in bowel habits  : NEGATIVE for unusual urinary or vaginal symptoms. No vaginal bleeding.  MUSCULOSKELETAL: NEGATIVE for significant arthralgias or myalgia  NEURO: NEGATIVE for weakness, dizziness or paresthesias  PSYCHIATRIC: NEGATIVE for changes in mood or affect     OBJECTIVE:   /76 (BP Location: Right arm, Patient Position: Chair, Cuff Size: Adult Regular)  Pulse 76  Temp 97.3  F (36.3  C) (Tympanic)  Resp 16  Ht 5' 4.75\" (1.645 m)  Wt 169 lb 12.8 oz (77 kg)  LMP 03/25/2016  BMI 28.47 kg/m2  EXAM:  GENERAL APPEARANCE: healthy, alert and no distress  EYES: Eyes grossly normal to inspection, PERRL and conjunctivae and sclerae normal  HENT: ear canals and TM's normal, nose and mouth without ulcers or lesions, oropharynx clear and oral mucous membranes moist  NECK: no adenopathy, no asymmetry, masses, or scars and thyroid normal to palpation  RESP: lungs clear to auscultation - no rales, rhonchi or wheezes  BREAST: normal without masses, tenderness or nipple discharge and no palpable axillary masses or adenopathy  CV: regular rate and rhythm, normal S1 S2, no S3 or S4, no murmur, click or rub, no peripheral edema and peripheral pulses strong  ABDOMEN: soft, nontender, no hepatosplenomegaly, no masses and bowel sounds normal   (female): normal female external genitalia, normal urethral meatus, vaginal " "mucosal atrophy noted, normal cervix, adnexae, and uterus without masses or abnormal discharge  MS: no musculoskeletal defects are noted and gait is age appropriate without ataxia  SKIN: no suspicious lesions or rashes  NEURO: Normal strength and tone, sensory exam grossly normal, mentation intact and speech normal  PSYCH: mentation appears normal and affect normal/bright    Diagnostic Test Results:  none     ASSESSMENT/PLAN:   1. Encounter for well adult exam without abnormal findings    - Pap imaged thin layer screen with HPV - recommended age 30 - 65  - HPV High Risk Types DNA Cervical    2. Mild intermittent asthma without complication  Stable no change in treatment plan.   - albuterol (PROAIR HFA/PROVENTIL HFA/VENTOLIN HFA) 108 (90 Base) MCG/ACT Inhaler; Inhale 2 puffs into the lungs every 4 hours as needed for shortness of breath / dyspnea  Dispense: 3 Inhaler; Refill: 1    3. Adenomatous polyp of sigmoid colon        COUNSELING:   Reviewed preventive health counseling, as reflected in patient instructions    BP Readings from Last 1 Encounters:   07/20/18 110/76     Estimated body mass index is 28.47 kg/(m^2) as calculated from the following:    Height as of this encounter: 5' 4.75\" (1.645 m).    Weight as of this encounter: 169 lb 12.8 oz (77 kg).      Weight management plan: Discussed healthy diet and exercise guidelines and patient will follow up in 12 months in clinic to re-evaluate.     reports that she has never smoked. She has never used smokeless tobacco.      Counseling Resources:  ATP IV Guidelines  Pooled Cohorts Equation Calculator  Breast Cancer Risk Calculator  FRAX Risk Assessment  ICSI Preventive Guidelines  Dietary Guidelines for Americans, 2010  USDA's MyPlate  ASA Prophylaxis  Lung CA Screening    Deanna Cagle MD  [facility]  "

## 2018-07-21 ASSESSMENT — ASTHMA QUESTIONNAIRES: ACT_TOTALSCORE: 25

## 2018-07-24 LAB
COPATH REPORT: NORMAL
PAP: NORMAL

## 2018-07-25 LAB
FINAL DIAGNOSIS: NORMAL
HPV HR 12 DNA CVX QL NAA+PROBE: NEGATIVE
HPV16 DNA SPEC QL NAA+PROBE: NEGATIVE
HPV18 DNA SPEC QL NAA+PROBE: NEGATIVE
SPECIMEN DESCRIPTION: NORMAL
SPECIMEN SOURCE CVX/VAG CYTO: NORMAL

## 2018-08-15 ENCOUNTER — HOSPITAL ENCOUNTER (OUTPATIENT)
Dept: MAMMOGRAPHY | Facility: CLINIC | Age: 53
Discharge: HOME | End: 2018-08-15
Attending: FAMILY MEDICINE | Admitting: FAMILY MEDICINE
Payer: COMMERCIAL

## 2018-08-15 DIAGNOSIS — Z12.31 VISIT FOR SCREENING MAMMOGRAM: ICD-10-CM

## 2018-08-15 PROCEDURE — 77063 BREAST TOMOSYNTHESIS BI: CPT

## 2019-04-05 ENCOUNTER — OFFICE VISIT (OUTPATIENT)
Dept: FAMILY MEDICINE | Facility: CLINIC | Age: 54
End: 2019-04-05
Payer: COMMERCIAL

## 2019-04-05 VITALS
DIASTOLIC BLOOD PRESSURE: 70 MMHG | BODY MASS INDEX: 30.39 KG/M2 | WEIGHT: 181.2 LBS | SYSTOLIC BLOOD PRESSURE: 124 MMHG | RESPIRATION RATE: 18 BRPM | OXYGEN SATURATION: 99 % | HEART RATE: 83 BPM | TEMPERATURE: 98 F

## 2019-04-05 DIAGNOSIS — R30.0 DYSURIA: Primary | ICD-10-CM

## 2019-04-05 LAB
ALBUMIN UR-MCNC: NEGATIVE MG/DL
APPEARANCE UR: CLEAR
BILIRUB UR QL STRIP: NEGATIVE
COLOR UR AUTO: YELLOW
GLUCOSE UR STRIP-MCNC: NEGATIVE MG/DL
HGB UR QL STRIP: NEGATIVE
KETONES UR STRIP-MCNC: NEGATIVE MG/DL
LEUKOCYTE ESTERASE UR QL STRIP: NEGATIVE
NITRATE UR QL: NEGATIVE
PH UR STRIP: 7 PH (ref 5–7)
SOURCE: NORMAL
SP GR UR STRIP: 1.01 (ref 1–1.03)
SPECIMEN SOURCE: NORMAL
UROBILINOGEN UR STRIP-ACNC: 0.2 EU/DL (ref 0.2–1)
WET PREP SPEC: NORMAL

## 2019-04-05 PROCEDURE — 87086 URINE CULTURE/COLONY COUNT: CPT | Performed by: PHYSICIAN ASSISTANT

## 2019-04-05 PROCEDURE — 87210 SMEAR WET MOUNT SALINE/INK: CPT | Performed by: PHYSICIAN ASSISTANT

## 2019-04-05 PROCEDURE — 99213 OFFICE O/P EST LOW 20 MIN: CPT | Performed by: PHYSICIAN ASSISTANT

## 2019-04-05 PROCEDURE — 81003 URINALYSIS AUTO W/O SCOPE: CPT | Performed by: PHYSICIAN ASSISTANT

## 2019-04-05 RX ORDER — MULTIVITAMIN WITH IRON
1 TABLET ORAL DAILY
COMMUNITY

## 2019-04-05 RX ORDER — SULFAMETHOXAZOLE/TRIMETHOPRIM 800-160 MG
1 TABLET ORAL 2 TIMES DAILY
Qty: 6 TABLET | Refills: 0 | Status: SHIPPED | OUTPATIENT
Start: 2019-04-05 | End: 2019-07-22

## 2019-04-05 ASSESSMENT — ENCOUNTER SYMPTOMS
WHEEZING: 0
COUGH: 0
VOMITING: 0
EYE DISCHARGE: 0
CHILLS: 0
NAUSEA: 0
EYE REDNESS: 0
ABDOMINAL PAIN: 0
FEVER: 0
FREQUENCY: 1
PALPITATIONS: 0
SHORTNESS OF BREATH: 0
SORE THROAT: 0
DIARRHEA: 0
HEADACHES: 0
MYALGIAS: 0
BLURRED VISION: 0
DYSURIA: 1

## 2019-04-05 NOTE — PROGRESS NOTES
SUBJECTIVE:   Shaista Villar is a 53 year old female who presents to clinic today for the following health issues:    URINARY TRACT SYMPTOMS  Onset: Tuesday     Description:   Painful urination (Dysuria): YES  Blood in urine (Hematuria): no   Delay in urine (Hesitency): no     Intensity: moderate    Progression of Symptoms:  same    Accompanying Signs & Symptoms:  Fever/chills: Yes- chilled yesterday    Flank pain YES- couple twinges on right side  Nausea and vomiting: no   Any vaginal symptoms: vaginal itching  Abdominal/Pelvic Pain: YES    History:   History of frequent UTI's: no   History of kidney stones: no   Sexually Active: YES  Possibility of pregnancy: No    Precipitating factors:   Pressure, fatigue yesterday     Therapies Tried and outcome: Cranberry juice and Increase fluid intake      Problem list and histories reviewed & adjusted, as indicated.  Additional history: as documented    Patient Active Problem List   Diagnosis     Mild intermittent asthma     Contraceptive management     CARDIOVASCULAR SCREENING; LDL GOAL LESS THAN 160     Polyp of colon, adenomatous     Menopause     Chronic seasonal allergic rhinitis due to other allergen     Past Surgical History:   Procedure Laterality Date     APPENDECTOMY       COLONOSCOPY  5/12/2014    Procedure: COMBINED COLONOSCOPY, SINGLE BIOPSY/POLYPECTOMY BY BIOPSY;  Surgeon: Ridge Duke MD;  Location: [facility]     SURGICAL HISTORY OF -   1991    [facility].       Social History     Tobacco Use     Smoking status: Never Smoker     Smokeless tobacco: Never Used   Substance Use Topics     Alcohol use: Yes     Comment: occas     Family History   Problem Relation Age of Onset     Cardiovascular Maternal Grandfather      Allergies Brother      Respiratory Brother         asthma     Asthma Brother      Glaucoma Brother         glaucoma     Respiratory Father         COPD     Cancer - colorectal Father 76     Diabetes Maternal Grandmother      Hypertension Paternal  Grandmother      Hypertension Brother          Current Outpatient Medications   Medication Sig Dispense Refill     albuterol (PROAIR HFA/PROVENTIL HFA/VENTOLIN HFA) 108 (90 Base) MCG/ACT Inhaler Inhale 2 puffs into the lungs every 4 hours as needed for shortness of breath / dyspnea 3 Inhaler 1     CALCIUM-MAGNESIUM 500-250 MG PO TABS 2 TABLETS DAILY       cetirizine-psuedoePHEDrine (ZYRTEC-D) 5-120 MG per 12 hr tablet Take 1 tablet by mouth daily       DHA-EPA-Coenzyme Q10-Vitamin E [id number] CAPS Take  by mouth.       magnesium 250 MG tablet Take 1 tablet by mouth daily 85 mg       MULTIVITAMIN OR one daily       sulfamethoxazole-trimethoprim (BACTRIM DS/SEPTRA DS) 800-160 MG tablet Take 1 tablet by mouth 2 times daily for 3 days 6 tablet 0     VITAMIN B COMPLEX OR TABS one daily       VITAMIN C 100 MG OR TABS prn       Vitamin D, Cholecalciferol, 1000 UNITS TABS Take 1 tablet by mouth daily       ZINC 10 MG OR TABS prn       Allergies   Allergen Reactions     Codeine      Tongue swelled     Darvocet [Acetaminophen] Nausea and Vomiting     Erythromycin Nausea and Vomiting     Sulfa Drugs Nausea and Vomiting     Labs reviewed in EPIC    Reviewed and updated as needed this visit by clinical staff  Tobacco  Allergies  Meds  Problems  Med Hx  Surg Hx  Fam Hx  Soc Hx        Reviewed and updated as needed this visit by Provider  Tobacco  Allergies  Meds  Problems  Med Hx  Surg Hx  Fam Hx         ROS:  Review of Systems   Constitutional: Negative for chills, fever and malaise/fatigue.   HENT: Negative for congestion, ear pain and sore throat.    Eyes: Negative for blurred vision, discharge and redness.   Respiratory: Negative for cough, shortness of breath and wheezing.    Cardiovascular: Negative for chest pain and palpitations.   Gastrointestinal: Negative for abdominal pain, diarrhea, nausea and vomiting.   Genitourinary: Positive for dysuria and frequency.   Musculoskeletal: Negative for joint pain  and myalgias.   Skin: Negative for rash.   Neurological: Negative for headaches.         OBJECTIVE:     /70 (BP Location: Right arm, Patient Position: Chair, Cuff Size: Adult Regular)   Pulse 83   Temp 98  F (36.7  C) (Tympanic)   Resp 18   Wt 82.2 kg (181 lb 3.2 oz)   LMP 03/25/2016   SpO2 99%   BMI 30.39 kg/m    Body mass index is 30.39 kg/m .    Physical Exam   Constitutional: She appears well-developed and well-nourished. No distress.   Abdominal: There is no CVA tenderness.   Psychiatric: She has a normal mood and affect. Her behavior is normal.       Diagnostic Test Results:  Urinalysis - unremarkable, urine culture pending     ASSESSMENT/PLAN:       1. Dysuria  Culture pending. Gave Rx for Bactrim x 3 days that she can fill if symptoms worsen or do not improve. Return to clinic if symptoms worsen or do not improve; otherwise follow up as needed      - *UA reflex to Microscopic and Culture ([place] and [facility], [place] (except [place] and [place])  - Wet prep  - Urine Culture Aerobic Bacterial  - sulfamethoxazole-trimethoprim (BACTRIM DS/SEPTRA DS) 800-160 MG tablet; Take 1 tablet by mouth 2 times daily for 3 days  Dispense: 6 tablet; Refill: 0     Rosa Zarco PA-C  [facility]

## 2019-04-05 NOTE — NURSING NOTE
"Chief Complaint   Patient presents with     UTI       Initial /70 (BP Location: Right arm, Patient Position: Chair, Cuff Size: Adult Regular)   Pulse 83   Temp 98  F (36.7  C) (Tympanic)   Resp 18   Wt 82.2 kg (181 lb 3.2 oz)   LMP 03/25/2016   SpO2 99%   BMI 30.39 kg/m   Estimated body mass index is 30.39 kg/m  as calculated from the following:    Height as of 7/20/18: 1.645 m (5' 4.75\").    Weight as of this encounter: 82.2 kg (181 lb 3.2 oz).    Patient presents to the clinic using No DME    Health Maintenance that is potentially due pending provider review:  NONE    n/a    Is there anyone who you would like to be able to receive your results? No  If yes have patient fill out ROSALINDA  Margo Garza M.A.          "

## 2019-04-08 LAB
BACTERIA SPEC CULT: NORMAL
Lab: NORMAL
SPECIMEN SOURCE: NORMAL

## 2019-07-19 ASSESSMENT — ENCOUNTER SYMPTOMS
EYE PAIN: 0
BREAST MASS: 0
WEAKNESS: 0
SHORTNESS OF BREATH: 1
JOINT SWELLING: 0
HEARTBURN: 0
HEADACHES: 0
PALPITATIONS: 0
DIZZINESS: 1
ARTHRALGIAS: 0
COUGH: 1
DYSURIA: 0
SORE THROAT: 0
MYALGIAS: 0
FREQUENCY: 0
HEMATURIA: 0
NAUSEA: 0
CHILLS: 0
CONSTIPATION: 0
NERVOUS/ANXIOUS: 0
HEMATOCHEZIA: 0
ABDOMINAL PAIN: 0
FEVER: 0
DIARRHEA: 0
PARESTHESIAS: 0

## 2019-07-22 ENCOUNTER — OFFICE VISIT (OUTPATIENT)
Dept: FAMILY MEDICINE | Facility: CLINIC | Age: 54
End: 2019-07-22
Payer: COMMERCIAL

## 2019-07-22 VITALS
DIASTOLIC BLOOD PRESSURE: 80 MMHG | HEIGHT: 64 IN | WEIGHT: 178.6 LBS | SYSTOLIC BLOOD PRESSURE: 124 MMHG | OXYGEN SATURATION: 100 % | BODY MASS INDEX: 30.49 KG/M2 | RESPIRATION RATE: 16 BRPM | TEMPERATURE: 98 F | HEART RATE: 72 BPM

## 2019-07-22 DIAGNOSIS — Z00.00 PREVENTATIVE HEALTH CARE: ICD-10-CM

## 2019-07-22 DIAGNOSIS — J45.20 MILD INTERMITTENT ASTHMA WITHOUT COMPLICATION: ICD-10-CM

## 2019-07-22 DIAGNOSIS — Z12.11 COLON CANCER SCREENING: ICD-10-CM

## 2019-07-22 DIAGNOSIS — Z00.00 ROUTINE GENERAL MEDICAL EXAMINATION AT A HEALTH CARE FACILITY: Primary | ICD-10-CM

## 2019-07-22 LAB
ANION GAP SERPL CALCULATED.3IONS-SCNC: 5 MMOL/L (ref 3–14)
BUN SERPL-MCNC: 12 MG/DL (ref 7–30)
CALCIUM SERPL-MCNC: 9.1 MG/DL (ref 8.5–10.1)
CHLORIDE SERPL-SCNC: 105 MMOL/L (ref 94–109)
CHOLEST SERPL-MCNC: 202 MG/DL
CO2 SERPL-SCNC: 29 MMOL/L (ref 20–32)
CREAT SERPL-MCNC: 0.65 MG/DL (ref 0.52–1.04)
GFR SERPL CREATININE-BSD FRML MDRD: >90 ML/MIN/{1.73_M2}
GLUCOSE SERPL-MCNC: 86 MG/DL (ref 70–99)
HDLC SERPL-MCNC: 99 MG/DL
LDLC SERPL CALC-MCNC: 94 MG/DL
NONHDLC SERPL-MCNC: 103 MG/DL
POTASSIUM SERPL-SCNC: 4.1 MMOL/L (ref 3.4–5.3)
SODIUM SERPL-SCNC: 139 MMOL/L (ref 133–144)
TRIGL SERPL-MCNC: 43 MG/DL
TSH SERPL DL<=0.005 MIU/L-ACNC: 1.4 MU/L (ref 0.4–4)

## 2019-07-22 PROCEDURE — 90750 HZV VACC RECOMBINANT IM: CPT | Performed by: FAMILY MEDICINE

## 2019-07-22 PROCEDURE — 80061 LIPID PANEL: CPT | Performed by: FAMILY MEDICINE

## 2019-07-22 PROCEDURE — 36415 COLL VENOUS BLD VENIPUNCTURE: CPT | Performed by: FAMILY MEDICINE

## 2019-07-22 PROCEDURE — 99396 PREV VISIT EST AGE 40-64: CPT | Mod: 25 | Performed by: FAMILY MEDICINE

## 2019-07-22 PROCEDURE — 90471 IMMUNIZATION ADMIN: CPT | Performed by: FAMILY MEDICINE

## 2019-07-22 PROCEDURE — 84443 ASSAY THYROID STIM HORMONE: CPT | Performed by: FAMILY MEDICINE

## 2019-07-22 PROCEDURE — 99213 OFFICE O/P EST LOW 20 MIN: CPT | Mod: 25 | Performed by: FAMILY MEDICINE

## 2019-07-22 PROCEDURE — 80048 BASIC METABOLIC PNL TOTAL CA: CPT | Performed by: FAMILY MEDICINE

## 2019-07-22 RX ORDER — FLUTICASONE PROPIONATE AND SALMETEROL XINAFOATE 115; 21 UG/1; UG/1
2 AEROSOL, METERED RESPIRATORY (INHALATION) 2 TIMES DAILY
Qty: 1 INHALER | Refills: 11 | Status: SHIPPED | OUTPATIENT
Start: 2019-07-22 | End: 2019-07-22

## 2019-07-22 RX ORDER — ALBUTEROL SULFATE 90 UG/1
2 AEROSOL, METERED RESPIRATORY (INHALATION) EVERY 4 HOURS PRN
Qty: 3 INHALER | Refills: 1 | Status: SHIPPED | OUTPATIENT
Start: 2019-07-22 | End: 2020-08-10

## 2019-07-22 RX ORDER — FLUTICASONE PROPIONATE AND SALMETEROL XINAFOATE 115; 21 UG/1; UG/1
2 AEROSOL, METERED RESPIRATORY (INHALATION) 2 TIMES DAILY
Qty: 1 INHALER | Refills: 11 | Status: SHIPPED | OUTPATIENT
Start: 2019-07-22 | End: 2020-08-10

## 2019-07-22 ASSESSMENT — ENCOUNTER SYMPTOMS
ABDOMINAL PAIN: 0
WEAKNESS: 0
FREQUENCY: 0
EYE PAIN: 0
HEARTBURN: 0
COUGH: 1
CHILLS: 0
HEMATURIA: 0
NERVOUS/ANXIOUS: 0
PARESTHESIAS: 0
DYSURIA: 0
BREAST MASS: 0
CONSTIPATION: 0
HEADACHES: 0
FEVER: 0
SHORTNESS OF BREATH: 1
DIARRHEA: 0
JOINT SWELLING: 0
ARTHRALGIAS: 0
MYALGIAS: 0
PALPITATIONS: 0
SORE THROAT: 0
DIZZINESS: 1
HEMATOCHEZIA: 0
NAUSEA: 0

## 2019-07-22 ASSESSMENT — MIFFLIN-ST. JEOR: SCORE: 1394.09

## 2019-07-22 NOTE — PATIENT INSTRUCTIONS

## 2019-07-22 NOTE — NURSING NOTE
Prior to injection verified patient identity using patient's name and date of birth.    Screening Questionnaire for Adult Immunization    Are you sick today?   Yes   Do you have allergies to medications, food, a vaccine component or latex?   No   Have you ever had a serious reaction after receiving a vaccination?   No   Do you have a long-term health problem with heart disease, lung disease, asthma, kidney disease, metabolic disease (e.g. diabetes), anemia, or other blood disorder?   Yes   Do you have cancer, leukemia, HIV/AIDS, or any other immune system problem?   No   In the past 3 months, have you taken medications that affect  your immune system, such as prednisone, other steroids, or anticancer drugs; drugs for the treatment of rheumatoid arthritis, Crohn s disease, or psoriasis; or have you had radiation treatments?   No   Have you had a seizure, or a brain or other nervous system problem?   No   During the past year, have you received a transfusion of blood or blood     products, or been given immune (gamma) globulin or antiviral drug?   No   For women: Are you pregnant or is there a chance you could become        pregnant during the next month?   No   Have you received any vaccinations in the past 4 weeks?   No     Immunization questionnaire was positive for at least one answer.  Notified Dr Cagle.        Per orders of Dr. Cagle, injection of Shingrix given by Karla Greenfield. Patient instructed to remain in clinic for 15 minutes afterwards, and to report any adverse reaction to me immediately.       Screening performed by Karla Greenfield on 7/22/2019 at 9:31 AM.

## 2019-07-22 NOTE — PROGRESS NOTES
SUBJECTIVE:   CC: Shaista Villar is an 53 year old woman who presents for preventive health visit.     Healthy Habits:     Getting at least 3 servings of Calcium per day:  Yes    Bi-annual eye exam:  Yes    Dental care twice a year:  Yes    Sleep apnea or symptoms of sleep apnea:  None    Diet:  Regular (no restrictions)    Frequency of exercise:  2-3 days/week    Duration of exercise:  45-60 minutes    Taking medications regularly:  Yes    Medication side effects:  None    PHQ-2 Total Score: 0    Additional concerns today:  No          Asthma Follow-Up    Was ACT completed today?    Pt with more wheeze and more allergy reaction this year than in the past she is using her albuterol 4-5 times a day and more if she is outside   Not on controller   Yes    ACT Total Scores 7/22/2019   ACT TOTAL SCORE -   ASTHMA ER VISITS -   ASTHMA HOSPITALIZATIONS -   ACT TOTAL SCORE (Goal Greater than or Equal to 20) 10   In the past 12 months, how many times did you visit the emergency room for your asthma without being admitted to the hospital? 0   In the past 12 months, how many times were you hospitalized overnight because of your asthma? 0       How many days per week do you miss taking your asthma controller medication?  I do not have an asthma controller medication    Please describe any recent triggers for your asthma: pollens    Have you had any Emergency Room Visits, Urgent Care Visits, or Hospital Admissions since your last office visit?  No        Today's PHQ-2 Score:   PHQ-2 ( 1999 [employer]) 7/19/2019   Q1: Little interest or pleasure in doing things 0   Q2: Feeling down, depressed or hopeless 0   PHQ-2 Score 0   Q1: Little interest or pleasure in doing things Not at all   Q2: Feeling down, depressed or hopeless Not at all   PHQ-2 Score 0       Abuse: Current or Past(Physical, Sexual or Emotional)- No  Do you feel safe in your environment? Yes    Social History     Tobacco Use     Smoking status: Never Smoker      Smokeless tobacco: Never Used   Substance Use Topics     Alcohol use: Yes     Comment: occas         No flowsheet data found.    Reviewed orders with patient.  Reviewed health maintenance and updated orders accordingly - Yes  Labs reviewed in EPIC    Mammogram Screening: Patient over age 50, mutual decision to screen reflected in health maintenance.    Pertinent mammograms are reviewed under the imaging tab.  History of abnormal Pap smear:   YES - updated in Problem List and Health Maintenance accordingly  Last 3 Pap and HPV Results:   PAP / HPV Latest Ref Rng & Units 7/20/2018 6/12/2015 5/29/2012   PAP - NIL NIL NIL   HPV 16 DNA NEG:Negative Negative Negative -   HPV 18 DNA NEG:Negative Negative Negative -   OTHER HR HPV NEG:Negative Negative Negative -     PAP / HPV Latest Ref Rng & Units 7/20/2018 6/12/2015 5/29/2012   PAP - NIL NIL NIL   HPV 16 DNA NEG:Negative Negative Negative -   HPV 18 DNA NEG:Negative Negative Negative -   OTHER HR HPV NEG:Negative Negative Negative -     Reviewed and updated as needed this visit by clinical staff  Tobacco  Allergies  Meds  Problems  Med Hx  Surg Hx  Fam Hx  Soc Hx          Reviewed and updated as needed this visit by Provider  Tobacco  Allergies  Meds  Problems  Med Hx  Surg Hx  Fam Hx            Review of Systems   Constitutional: Negative for chills and fever.   HENT: Negative for congestion, ear pain, hearing loss and sore throat.    Eyes: Negative for pain and visual disturbance.   Respiratory: Positive for cough and shortness of breath.    Cardiovascular: Positive for chest pain. Negative for palpitations and peripheral edema.   Gastrointestinal: Negative for abdominal pain, constipation, diarrhea, heartburn, hematochezia and nausea.   Breasts:  Negative for tenderness, breast mass and discharge.   Genitourinary: Negative for dysuria, frequency, genital sores, hematuria, pelvic pain, urgency, vaginal bleeding and vaginal discharge.   Musculoskeletal:  "Negative for arthralgias, joint swelling and myalgias.   Skin: Negative for rash.   Neurological: Positive for dizziness. Negative for weakness, headaches and paresthesias.   Psychiatric/Behavioral: Negative for mood changes. The patient is not nervous/anxious.      Asthma is flaring      OBJECTIVE:   /80 (BP Location: Right arm, Patient Position: Chair, Cuff Size: Adult Large)   Pulse 72   Temp 98  F (36.7  C) (Tympanic)   Resp 16   Ht 1.632 m (5' 4.25\")   Wt 81 kg (178 lb 9.6 oz)   LMP 03/25/2016   SpO2 100%   Breastfeeding? No   BMI 30.42 kg/m    Physical Exam  GENERAL APPEARANCE: healthy, alert and no distress  EYES: Eyes grossly normal to inspection, PERRL and conjunctivae and sclerae normal  HENT: ear canals and TM's normal, nose and mouth without ulcers or lesions, oropharynx clear and oral mucous membranes moist  NECK: no adenopathy, no asymmetry, masses, or scars and thyroid normal to palpation  RESP: lungs clear to auscultation - no rales, rhonchi or wheezes  BREAST: normal without masses, tenderness or nipple discharge and no palpable axillary masses or adenopathy  CV: regular rate and rhythm, normal S1 S2, no S3 or S4, no murmur, click or rub, no peripheral edema and peripheral pulses strong  ABDOMEN: soft, nontender, no hepatosplenomegaly, no masses and bowel sounds normal  MS: no musculoskeletal defects are noted and gait is age appropriate without ataxia  SKIN: no suspicious lesions or rashes  NEURO: Normal strength and tone, sensory exam grossly normal, mentation intact and speech normal  PSYCH: mentation appears normal and affect normal/bright    Diagnostic Test Results:  Labs reviewed in Epic    ASSESSMENT/PLAN:   1. Routine general medical examination at a health care facility    - Basic metabolic panel  - TSH with free T4 reflex  - Lipid panel reflex to direct LDL Fasting  - ZOSTER VACCINE RECOMBINANT ADJUVANTED IM NJX  - ADMIN 1st VACCINE    2. Preventative health care      3. " "Mild intermittent asthma without complication  Not well controlled   Will add med and needs to have repeat act in 1 month   - albuterol (PROAIR HFA/PROVENTIL HFA/VENTOLIN HFA) 108 (90 Base) MCG/ACT inhaler; Inhale 2 puffs into the lungs every 4 hours as needed for shortness of breath / dyspnea  Dispense: 3 Inhaler; Refill: 1  - fluticasone-salmeterol (ADVAIR-HFA) 115-21 MCG/ACT inhaler; Inhale 2 puffs into the lungs 2 times daily  Dispense: 1 Inhaler; Refill: 11    4. Colon cancer screening    - GASTROENTEROLOGY ADULT REF PROCEDURE ONLY    COUNSELING:  Reviewed preventive health counseling, as reflected in patient instructions    Estimated body mass index is 30.42 kg/m  as calculated from the following:    Height as of this encounter: 1.632 m (5' 4.25\").    Weight as of this encounter: 81 kg (178 lb 9.6 oz).    Weight management plan: Discussed healthy diet and exercise guidelines     reports that she has never smoked. She has never used smokeless tobacco.    Patient Instructions     Preventive Health Recommendations  Female Ages 50 - 64    Yearly exam: See your health care provider every year in order to  o Review health changes.   o Discuss preventive care.    o Review your medicines if your doctor has prescribed any.      Get a Pap test every three years (unless you have an abnormal result and your provider advises testing more often).    If you get Pap tests with HPV test, you only need to test every 5 years, unless you have an abnormal result.     You do not need a Pap test if your uterus was removed (hysterectomy) and you have not had cancer.    You should be tested each year for STDs (sexually transmitted diseases) if you're at risk.     Have a mammogram every 1 to 2 years.    Have a colonoscopy at age 50, or have a yearly FIT test (stool test). These exams screen for colon cancer.      Have a cholesterol test every 5 years, or more often if advised.    Have a diabetes test (fasting glucose) every three " years. If you are at risk for diabetes, you should have this test more often.     If you are at risk for osteoporosis (brittle bone disease), think about having a bone density scan (DEXA).    Shots: Get a flu shot each year. Get a tetanus shot every 10 years.    Nutrition:     Eat at least 5 servings of fruits and vegetables each day.    Eat whole-grain bread, whole-wheat pasta and brown rice instead of white grains and rice.    Get adequate Calcium and Vitamin D.     Lifestyle    Exercise at least 150 minutes a week (30 minutes a day, 5 days a week). This will help you control your weight and prevent disease.    Limit alcohol to one drink per day.    No smoking.     Wear sunscreen to prevent skin cancer.     See your dentist every six months for an exam and cleaning.    See your eye doctor every 1 to 2 years.    Send me [employer] message with update on asthma after starting new inhaler     Continue to use albuterol as needed    Labs today     Set up colonoscopy     Shingles vaccine today         Counseling Resources:  ATP IV Guidelines  Pooled Cohorts Equation Calculator  Breast Cancer Risk Calculator  FRAX Risk Assessment  ICSI Preventive Guidelines  Dietary Guidelines for Americans, 2010  USDA's MyPlate  ASA Prophylaxis  Lung CA Screening    Deanna Cagle MD  [facility]

## 2019-07-22 NOTE — LETTER
My Asthma Action Plan  Name: Shaista Villar   YOB: 1965  Date: 7/22/2019   My doctor: Deanna Cagle MD   My clinic: [facility]        My Control Medicine: Fluticasone + salmeterol (Advair) -  /21 mcg 2 puffs twice daily  My Rescue Medicine: Albuterol (Proair/Ventolin/Proventil) inhaler as needed   My Asthma Severity: intermittent  Avoid your asthma triggers:   pollens            GREEN ZONE   Good Control    I feel good    No cough or wheeze    Can work, sleep and play without asthma symptoms       Take your asthma control medicine every day.     1. If exercise triggers your asthma, take your rescue medication    15 minutes before exercise or sports, and    During exercise if you have asthma symptoms  2. Spacer to use with inhaler: If you have a spacer, make sure to use it with your inhaler             YELLOW ZONE Getting Worse  I have ANY of these:    I do not feel good    Cough or wheeze    Chest feels tight    Wake up at night   1. Keep taking your Green Zone medications  2. Start taking your rescue medicine:    every 20 minutes for up to 1 hour. Then every 4 hours for 24-48 hours.  3. If you stay in the Yellow Zone for more than 12-24 hours, contact your doctor.  4. If you do not return to the Green Zone in 12-24 hours or you get worse, start taking your oral steroid medicine if prescribed by your provider.           RED ZONE Medical Alert - Get Help  I have ANY of these:    I feel awful    Medicine is not helping    Breathing getting harder    Trouble walking or talking    Nose opens wide to breathe       1. Take your rescue medicine NOW  2. If your provider has prescribed an oral steroid medicine, start taking it NOW  3. Call your doctor NOW  4. If you are still in the Red Zone after 20 minutes and you have not reached your doctor:    Take your rescue medicine again and    Call 911 or go to the emergency room right away    See your regular doctor within 2 weeks  of an Emergency Room or Urgent Care visit for follow-up treatment.          Annual Reminders:  Meet with Asthma Educator,  Flu Shot in the Fall, consider Pneumonia Vaccination for patients with asthma (aged 19 and older).    Pharmacy:    [facility] [id number] [facility] - [address] - [place], [place]  [place] - MAIL ORDER MAINT MEDS - NON-EPRESCRIBE  [facility] - [place], [place] - [address] AT PORTAL TO REGISTERED [facility] SITES                      Asthma Triggers  How To Control Things That Make Your Asthma Worse    Triggers are things that make your asthma worse.  Look at the list below to help you find your triggers and what you can do about them.  You can help prevent asthma flare-ups by staying away from your triggers.      Trigger                                                          What you can do   Cigarette Smoke  Tobacco smoke can make asthma worse. Do not allow smoking in your home, car or around you.  Be sure no one smokes at a child s day care or school.  If you smoke, ask your health care provider for ways to help you quit.  Ask family members to quit too.  Ask your health care provider for a referral to Quit Plan to help you quit smoking, or call 2-991-574-PLAN.     Colds, Flu, Bronchitis  These are common triggers of asthma. Wash your hands often.  Don t touch your eyes, nose or mouth.  Get a flu shot every year.     Dust Mites  These are tiny bugs that live in cloth or carpet. They are too small to see. Wash sheets and blankets in hot water every week.   Encase pillows and mattress in dust mite proof covers.  Avoid having carpet if you can. If you have carpet, vacuum weekly.   Use a dust mask and HEPA vacuum.   Pollen and Outdoor Mold  Some people are allergic to trees, grass, or weed pollen, or molds. Try to keep your windows closed.  Limit time out doors when pollen count is high.   Ask you health care provider about taking medicine  during allergy season.     Animal Dander  Some people are allergic to skin flakes, urine or saliva from pets with fur or feathers. Keep pets with fur or feathers out of your home.    If you can t keep the pet outdoors, then keep the pet out of your bedroom.  Keep the bedroom door closed.  Keep pets off cloth furniture and away from stuffed toys.     Mice, Rats, and Cockroaches  Some people are allergic to the waste from these pests.   Cover food and garbage.  Clean up spills and food crumbs.  Store grease in the refrigerator.   Keep food out of the bedroom.   Indoor Mold  This can be a trigger if your home has high moisture. Fix leaking faucets, pipes, or other sources of water.   Clean moldy surfaces.  Dehumidify basement if it is damp and smelly.   Smoke, Strong Odors, and Sprays  These can reduce air quality. Stay away from strong odors and sprays, such as perfume, powder, hair spray, paints, smoke incense, paint, cleaning products, candles and new carpet.   Exercise or Sports  Some people with asthma have this trigger. Be active!  Ask your doctor about taking medicine before sports or exercise to prevent symptoms.    Warm up for 5-10 minutes before and after sports or exercise.     Other Triggers of Asthma  Cold air:  Cover your nose and mouth with a scarf.  Sometimes laughing or crying can be a trigger.  Some medicines and food can trigger asthma.

## 2019-07-23 ASSESSMENT — ASTHMA QUESTIONNAIRES: ACT_TOTALSCORE: 10

## 2019-08-13 ENCOUNTER — MYC MEDICAL ADVICE (OUTPATIENT)
Dept: FAMILY MEDICINE | Facility: CLINIC | Age: 54
End: 2019-08-13

## 2019-08-20 ENCOUNTER — MYC MEDICAL ADVICE (OUTPATIENT)
Dept: FAMILY MEDICINE | Facility: CLINIC | Age: 54
End: 2019-08-20

## 2019-08-20 DIAGNOSIS — Z12.31 ENCOUNTER FOR SCREENING MAMMOGRAM FOR BREAST CANCER: Primary | ICD-10-CM

## 2019-08-21 ENCOUNTER — HOSPITAL ENCOUNTER (OUTPATIENT)
Dept: MAMMOGRAPHY | Facility: CLINIC | Age: 54
Discharge: HOME | End: 2019-08-21
Attending: FAMILY MEDICINE | Admitting: FAMILY MEDICINE
Payer: COMMERCIAL

## 2019-08-21 DIAGNOSIS — Z12.31 VISIT FOR SCREENING MAMMOGRAM: ICD-10-CM

## 2019-08-21 PROCEDURE — 77063 BREAST TOMOSYNTHESIS BI: CPT

## 2019-09-22 ENCOUNTER — MYC MEDICAL ADVICE (OUTPATIENT)
Dept: FAMILY MEDICINE | Facility: CLINIC | Age: 54
End: 2019-09-22

## 2019-09-23 ENCOUNTER — OFFICE VISIT (OUTPATIENT)
Dept: FAMILY MEDICINE | Facility: CLINIC | Age: 54
End: 2019-09-23
Payer: COMMERCIAL

## 2019-09-23 VITALS
RESPIRATION RATE: 16 BRPM | BODY MASS INDEX: 30.69 KG/M2 | TEMPERATURE: 98.4 F | DIASTOLIC BLOOD PRESSURE: 86 MMHG | OXYGEN SATURATION: 97 % | SYSTOLIC BLOOD PRESSURE: 132 MMHG | WEIGHT: 180.2 LBS | HEART RATE: 78 BPM

## 2019-09-23 DIAGNOSIS — R10.13 EPIGASTRIC PAIN: Primary | ICD-10-CM

## 2019-09-23 DIAGNOSIS — R16.0 LIVER MASS: ICD-10-CM

## 2019-09-23 LAB
ALBUMIN SERPL-MCNC: 4 G/DL (ref 3.4–5)
ALP SERPL-CCNC: 62 U/L (ref 40–150)
ALT SERPL W P-5'-P-CCNC: 24 U/L (ref 0–50)
ANION GAP SERPL CALCULATED.3IONS-SCNC: 8 MMOL/L (ref 3–14)
AST SERPL W P-5'-P-CCNC: 16 U/L (ref 0–45)
BILIRUB SERPL-MCNC: 0.3 MG/DL (ref 0.2–1.3)
BUN SERPL-MCNC: 13 MG/DL (ref 7–30)
CALCIUM SERPL-MCNC: 9.1 MG/DL (ref 8.5–10.1)
CHLORIDE SERPL-SCNC: 102 MMOL/L (ref 94–109)
CO2 SERPL-SCNC: 26 MMOL/L (ref 20–32)
CREAT SERPL-MCNC: 0.61 MG/DL (ref 0.52–1.04)
GFR SERPL CREATININE-BSD FRML MDRD: >90 ML/MIN/{1.73_M2}
GLUCOSE SERPL-MCNC: 93 MG/DL (ref 70–99)
POTASSIUM SERPL-SCNC: 3.9 MMOL/L (ref 3.4–5.3)
PROT SERPL-MCNC: 7.6 G/DL (ref 6.8–8.8)
SODIUM SERPL-SCNC: 136 MMOL/L (ref 133–144)

## 2019-09-23 PROCEDURE — 80053 COMPREHEN METABOLIC PANEL: CPT | Performed by: FAMILY MEDICINE

## 2019-09-23 PROCEDURE — 99214 OFFICE O/P EST MOD 30 MIN: CPT | Performed by: FAMILY MEDICINE

## 2019-09-23 PROCEDURE — 36415 COLL VENOUS BLD VENIPUNCTURE: CPT | Performed by: FAMILY MEDICINE

## 2019-09-23 NOTE — NURSING NOTE
"Chief Complaint   Patient presents with     Abdominal Pain       Initial /86 (BP Location: Right arm, Patient Position: Chair, Cuff Size: Adult Large)   Pulse 78   Temp 98.4  F (36.9  C) (Tympanic)   Resp 16   Wt 81.7 kg (180 lb 3.2 oz)   LMP 03/25/2016   SpO2 97%   BMI 30.69 kg/m   Estimated body mass index is 30.69 kg/m  as calculated from the following:    Height as of 7/22/19: 1.632 m (5' 4.25\").    Weight as of this encounter: 81.7 kg (180 lb 3.2 oz).    Patient presents to the clinic using No DME    Health Maintenance that is potentially due pending provider review:  Colonoscopy/FIT    Pt is already scheduled for colonoscopy.    Is there anyone who you would like to be able to receive your results? Yes  If yes have patient fill out ROSALINDA    "

## 2019-09-23 NOTE — PROGRESS NOTES
Subjective     Shaista Villar is a 53 year old female who presents to clinic today for the following health issues:    HPI   Abdominal Pain      Duration: 2 weeks    Description (location/character/radiation): upper    Abdominal pain, comes and goes.  Dull pain feels like a pressure     Uncomfortable to sit and feels better if she lays and stretches out        Associated flank pain: radiating to back sometimes    Intensity:  severe, 7/10    Accompanying signs and symptoms:        Fever/Chills: no        Gas/Bloating: no        Nausea/vomitting: YES- nausea       Diarrhea: no        Dysuria or Hematuria: no     History (previous similar pain/trauma/previous testing): none    Precipitating or alleviating factors:       Pain worse with eating/BM/urination: no, but feels full before finishing meal       Pain relieved by BM: no     Therapies tried and outcome: antacids not helping    LMP:  not applicable              Reviewed and updated as needed this visit by Provider  Tobacco  Allergies  Meds  Problems  Med Hx  Surg Hx  Fam Hx         Review of Systems   ROS COMP: Constitutional, HEENT, cardiovascular, pulmonary, gi and gu systems are negative, except as otherwise noted.      Objective    /86 (BP Location: Right arm, Patient Position: Chair, Cuff Size: Adult Large)   Pulse 78   Temp 98.4  F (36.9  C) (Tympanic)   Resp 16   Wt 81.7 kg (180 lb 3.2 oz)   LMP 03/25/2016   SpO2 97%   BMI 30.69 kg/m    Body mass index is 30.69 kg/m .  Physical Exam   GENERAL APPEARANCE: healthy, alert and no distress  RESP: lungs clear to auscultation - no rales, rhonchi or wheezes  CV: regular rates and rhythm, normal S1 S2, no S3 or S4 and no murmur, click or rub  ABDOMEN: bowel sounds normal and tender in the right upper quadrant   No mass   PSYCH: mentation appears normal and affect normal/bright    Diagnostic Test Results:  Labs reviewed in Epic        Assessment & Plan     1. Epigastric pain  Not a clear etiology  "at the time of the exam   - US Abdomen Limited; Future  - Comprehensive metabolic panel      BMI:   Estimated body mass index is 30.69 kg/m  as calculated from the following:    Height as of 7/22/19: 1.632 m (5' 4.25\").    Weight as of this encounter: 81.7 kg (180 lb 3.2 oz).           Patient Instructions   Set up US      Labs     Based on the above we will determine next steps        Return in about 3 days (around 9/26/2019), or if symptoms worsen or fail to improve.    Deanna Cagle MD  [facility]      Addendum   US reveals a mass on the liver and needs MRI   Will contact the patient and review  Deanna Cagle MD     "

## 2019-09-24 ENCOUNTER — HOSPITAL ENCOUNTER (OUTPATIENT)
Dept: ULTRASOUND IMAGING | Facility: CLINIC | Age: 54
Discharge: HOME | End: 2019-09-24
Attending: FAMILY MEDICINE | Admitting: FAMILY MEDICINE
Payer: COMMERCIAL

## 2019-09-24 ENCOUNTER — MYC MEDICAL ADVICE (OUTPATIENT)
Dept: FAMILY MEDICINE | Facility: CLINIC | Age: 54
End: 2019-09-24
Payer: COMMERCIAL

## 2019-09-24 DIAGNOSIS — R16.0 LIVER MASS: Primary | ICD-10-CM

## 2019-09-24 DIAGNOSIS — R10.13 EPIGASTRIC PAIN: ICD-10-CM

## 2019-09-24 PROCEDURE — 76705 ECHO EXAM OF ABDOMEN: CPT

## 2019-09-25 ENCOUNTER — ANCILLARY PROCEDURE (OUTPATIENT)
Dept: MRI IMAGING | Facility: CLINIC | Age: 54
End: 2019-09-25
Attending: FAMILY MEDICINE
Payer: COMMERCIAL

## 2019-09-25 ENCOUNTER — TELEPHONE (OUTPATIENT)
Dept: FAMILY MEDICINE | Facility: CLINIC | Age: 54
End: 2019-09-25

## 2019-09-25 DIAGNOSIS — R16.0 LIVER MASS: ICD-10-CM

## 2019-09-25 PROCEDURE — A9585 GADOBUTROL INJECTION: HCPCS

## 2019-09-25 PROCEDURE — 74183 MRI ABD W/O CNTR FLWD CNTR: CPT | Mod: TC

## 2019-09-25 RX ORDER — GADOBUTROL 604.72 MG/ML
10 INJECTION INTRAVENOUS ONCE
Status: COMPLETED | OUTPATIENT
Start: 2019-09-25 | End: 2019-09-25

## 2019-09-25 RX ADMIN — GADOBUTROL 8.5 ML: 604.72 INJECTION INTRAVENOUS at 09:31

## 2019-10-02 ENCOUNTER — OFFICE VISIT (OUTPATIENT)
Dept: SURGERY | Facility: CLINIC | Age: 54
End: 2019-10-02
Payer: COMMERCIAL

## 2019-10-02 VITALS — DIASTOLIC BLOOD PRESSURE: 88 MMHG | SYSTOLIC BLOOD PRESSURE: 148 MMHG | HEART RATE: 87 BPM | TEMPERATURE: 98.8 F

## 2019-10-02 DIAGNOSIS — D18.03 HEPATIC HEMANGIOMA: ICD-10-CM

## 2019-10-02 DIAGNOSIS — R10.11 RIGHT UPPER QUADRANT ABDOMINAL PAIN: Primary | ICD-10-CM

## 2019-10-02 PROCEDURE — 99244 OFF/OP CNSLTJ NEW/EST MOD 40: CPT | Performed by: SURGERY

## 2019-10-02 NOTE — NURSING NOTE
"Initial BP (!) 148/88 (BP Location: Right arm, Patient Position: Sitting, Cuff Size: Adult Regular)   Pulse 87   Temp 98.8  F (37.1  C) (Tympanic)   LMP 03/25/2016  Estimated body mass index is 30.69 kg/m  as calculated from the following:    Height as of 7/22/19: 1.632 m (5' 4.25\").    Weight as of 9/23/19: 81.7 kg (180 lb 3.2 oz). .    Felisha Lovett MA    "

## 2019-10-02 NOTE — LETTER
10/2/2019         RE: Shaista Villar  [address] [facility] [address]        Dear Colleague,    Thank you for referring your patient, Shaista Villar, to the [facility]. Please see a copy of my visit note below.    Asked by Dr. Cagle to see patient regarding her MRI results of hepatic hemangioma.    53-year-old female has been having right upper quadrant abdominal pain for the past 3 weeks.  Patient reports the pain is dull and achy with twinges of pain.  It is worse with activity and sitting but alleviated by lying flat.  She reports decreased appetite.  She does have occasional nausea.  Pain is approximately 2-3 out of 10.  She reports the pain occasionally throbs.  She has had an ultrasound and MRI which showed a 3 cm left hepatic lobe hemangioma.    Patient Active Problem List   Diagnosis     Mild intermittent asthma     Contraceptive management     CARDIOVASCULAR SCREENING; LDL GOAL LESS THAN 160     Polyp of colon, adenomatous     Menopause     Chronic seasonal allergic rhinitis due to other allergen       Past Medical History:   Diagnosis Date     ASCUS on Pap smear 4/2005    (negative) HPV     Mild intermittent asthma      SEASONAL ALLERGIES        Past Surgical History:   Procedure Laterality Date     APPENDECTOMY       COLONOSCOPY  5/12/2014    Procedure: COMBINED COLONOSCOPY, SINGLE BIOPSY/POLYPECTOMY BY BIOPSY;  Surgeon: Ridge Duke MD;  Location: [facility]     SURGICAL HISTORY OF -   1991 [place]       Family History   Problem Relation Age of Onset     Cardiovascular Maternal Grandfather      Allergies Brother      Respiratory Brother         asthma     Asthma Brother      Glaucoma Brother         glaucoma     Respiratory Father         COPD     Cancer - colorectal Father 76     Diabetes Maternal Grandmother      Hypertension Paternal Grandmother      Hypertension Brother        Social History     Tobacco Use     Smoking status: Never Smoker     Smokeless tobacco:  Never Used   Substance Use Topics     Alcohol use: Yes     Comment: occas        History   Drug Use No       Current Outpatient Medications   Medication Sig Dispense Refill     albuterol (PROAIR HFA/PROVENTIL HFA/VENTOLIN HFA) 108 (90 Base) MCG/ACT inhaler Inhale 2 puffs into the lungs every 4 hours as needed for shortness of breath / dyspnea 3 Inhaler 1     CALCIUM-MAGNESIUM 500-250 MG PO TABS 2 TABLETS DAILY       cetirizine-psuedoePHEDrine (ZYRTEC-D) 5-120 MG per 12 hr tablet Take 1 tablet by mouth daily       DHA-EPA-Coenzyme Q10-Vitamin E [id number] CAPS Take  by mouth.       fluticasone-salmeterol (ADVAIR-HFA) 115-21 MCG/ACT inhaler Inhale 2 puffs into the lungs 2 times daily 1 Inhaler 11     magnesium 250 MG tablet Take 1 tablet by mouth daily 85 mg       MULTIVITAMIN OR one daily       VITAMIN B COMPLEX OR TABS one daily       VITAMIN C 100 MG OR TABS prn       Vitamin D, Cholecalciferol, 1000 UNITS TABS Take 1 tablet by mouth daily       ZINC 10 MG OR TABS prn         Allergies   Allergen Reactions     Codeine      Tongue swelled     Darvocet [Acetaminophen] Nausea and Vomiting     Erythromycin Nausea and Vomiting     Sulfa Drugs Nausea and Vomiting      CBC  Recent Labs   Lab Test 06/12/15  0814   WBC 6.4   RBC 4.19   HGB 13.7   HCT 40.3   MCV 96   MCH 32.7   MCHC 34.0   RDW 12.9          BMP  Recent Labs   Lab Test 09/23/19  1535      POTASSIUM 3.9   MARLENI 9.1   CHLORIDE 102   CO2 26   BUN 13   CR 0.61   GLC 93       LFTs  Recent Labs   Lab Test 09/23/19  1535   PROTTOTAL 7.6   ALBUMIN 4.0   BILITOTAL 0.3   ALKPHOS 62   AST 16   ALT 24     Results for orders placed or performed in visit on 09/25/19   MR Abdomen w/o & w Contrast    Narrative    MR ABDOMEN WITHOUT AND WITH CONTRAST    9/25/2019 9:33 AM     HISTORY: Liver lesion, >1 cm, normal liver, no known malignancy. Liver  mass.    TECHNIQUE: Multiplanar, multiecho MRI was performed using T1, T2, and  in- and out-of-phase imaging. Pre- and  postcontrast T1 images were  acquired after the administration of 8.5 mL Gadavist IV.    COMPARISON: Correlated with ultrasound the abdomen performed on  9/24/2019.    FINDINGS: 3.0 x 2.5 cm T2 hyperintense lesion is seen within the left  hepatic lobe and additional 0.9 x 0.9 cm T2 hyperintense lesion is  seen in the right hepatic lobe. Both of these lesions are hypointense  on T1-weighted images. Peripheral, nodular centripetal enhancement is  seen on the postcontrast images (series 11, image 19 and image 43). No  intrahepatic or extrahepatic biliary duct dilatation is present. The  gallbladder is unremarkable.    The spleen, adrenal glands and pancreas are unremarkable. Kidneys are  unremarkable. No hydronephrosis is seen. Stomach is mildly distended  with ingested contents and air. Small and large bowel loops are  nondilated. Abdominal aorta and IVC are of normal course and caliber.  No retroperitoneal or mesenteric lymphadenopathy seen.    Visualized lung bases are unremarkable. No suspicious osseous lesion  seen.      Impression    IMPRESSION:  Hepatic hemangiomas seen in the liver measuring up to 3.0 cm in the  left hepatic lobe.    SABINO TORRES MD     ROS  Constitutional - Denies fevers, weight loss, malaise, lethargy  Neuro - Denies tremors or seizures  Pulmon - Denies SOB, dyspnea, hemoptysis, chronic cough or use of an inhaler  CV - Denies CP, SOB, lower extremity edema, difficulty w/ stairs, has never used NTG  GI - Denies hematemesis, BRBPR, melena, chronic diarrhea or epigastric pain   - Denies hematuria, difficulty voiding, h/o STDs  Hematology - Denies blood clotting disorders, chronic anemias  Dermatology - No melanomas or skin cancers  Rheumatology - No h/o RA  Pysch - Denies depression, bipolar d/o or schizophrenia    Exam:BP (!) 148/88 (BP Location: Right arm, Patient Position: Sitting, Cuff Size: Adult Regular)   Pulse 87   Temp 98.8  F (37.1  C) (Tympanic)   LMP 03/25/2016     General -  Alert and Oriented X4, NAD, well nourished  HEENT - Normocephalic, atraumatic, PERRLA, Nose midline  Lungs - Clear to auscultation bilaterally with good inspiratory effort, no tactile fremitus  CV - Heart RRR, no lift's, thrills, murmurs, rubs, or gallops. Carotid, radial, and femoral pulses 2+ bilaterally  Abdomen - Soft, non-tender, +BS, no hepatosplenomegaly, no palpable masses  Neuro - Full ROM, Strength 5/5 and major muscle groups, sensation intact  Extremities - No cyanosis, clubbing or edema    Assessment and plan: 53-year-old female with hepatic hemangioma found incidentally on ultrasound and MRI.  Fortunately this is relatively small at 3 cm.  It is likely asymptomatic but we need to work-up other causes.  I have ordered a HIDA scan to take a look at the function of her gallbladder and will schedule her for an upper endoscopy later this month when she is already scheduled to have a colonoscopy.  If all these are negative and she continues to have symptoms, I will refer her for evaluation by the hepatobiliary surgeries at the [facility] to see if this hemangioma could be the cause of her pain.  At the very least, she will need repeat imaging in 6 to 12 months to make sure that this is not enlarging.    Again, thank you for allowing me to participate in the care of your patient.        Sincerely,        John Vargas MD

## 2019-10-02 NOTE — PROGRESS NOTES
Asked by Dr. Cagle to see patient regarding her MRI results of hepatic hemangioma.    53-year-old female has been having right upper quadrant abdominal pain for the past 3 weeks.  Patient reports the pain is dull and achy with twinges of pain.  It is worse with activity and sitting but alleviated by lying flat.  She reports decreased appetite.  She does have occasional nausea.  Pain is approximately 2-3 out of 10.  She reports the pain occasionally throbs.  She has had an ultrasound and MRI which showed a 3 cm left hepatic lobe hemangioma.    Patient Active Problem List   Diagnosis     Mild intermittent asthma     Contraceptive management     CARDIOVASCULAR SCREENING; LDL GOAL LESS THAN 160     Polyp of colon, adenomatous     Menopause     Chronic seasonal allergic rhinitis due to other allergen       Past Medical History:   Diagnosis Date     ASCUS on Pap smear 4/2005    (negative) HPV     Mild intermittent asthma      SEASONAL ALLERGIES        Past Surgical History:   Procedure Laterality Date     APPENDECTOMY       COLONOSCOPY  5/12/2014    Procedure: COMBINED COLONOSCOPY, SINGLE BIOPSY/POLYPECTOMY BY BIOPSY;  Surgeon: Ridge Duke MD;  Location: [facility]     SURGICAL HISTORY OF -   1991 [place].       Family History   Problem Relation Age of Onset     Cardiovascular Maternal Grandfather      Allergies Brother      Respiratory Brother         asthma     Asthma Brother      Glaucoma Brother         glaucoma     Respiratory Father         COPD     Cancer - colorectal Father 76     Diabetes Maternal Grandmother      Hypertension Paternal Grandmother      Hypertension Brother        Social History     Tobacco Use     Smoking status: Never Smoker     Smokeless tobacco: Never Used   Substance Use Topics     Alcohol use: Yes     Comment: occas        History   Drug Use No       Current Outpatient Medications   Medication Sig Dispense Refill     albuterol (PROAIR HFA/PROVENTIL HFA/VENTOLIN HFA) 108 (90 Base) MCG/ACT  inhaler Inhale 2 puffs into the lungs every 4 hours as needed for shortness of breath / dyspnea 3 Inhaler 1     CALCIUM-MAGNESIUM 500-250 MG PO TABS 2 TABLETS DAILY       cetirizine-psuedoePHEDrine (ZYRTEC-D) 5-120 MG per 12 hr tablet Take 1 tablet by mouth daily       DHA-EPA-Coenzyme Q10-Vitamin E [id number] CAPS Take  by mouth.       fluticasone-salmeterol (ADVAIR-HFA) 115-21 MCG/ACT inhaler Inhale 2 puffs into the lungs 2 times daily 1 Inhaler 11     magnesium 250 MG tablet Take 1 tablet by mouth daily 85 mg       MULTIVITAMIN OR one daily       VITAMIN B COMPLEX OR TABS one daily       VITAMIN C 100 MG OR TABS prn       Vitamin D, Cholecalciferol, 1000 UNITS TABS Take 1 tablet by mouth daily       ZINC 10 MG OR TABS prn         Allergies   Allergen Reactions     Codeine      Tongue swelled     Darvocet [Acetaminophen] Nausea and Vomiting     Erythromycin Nausea and Vomiting     Sulfa Drugs Nausea and Vomiting      CBC  Recent Labs   Lab Test 06/12/15  0814   WBC 6.4   RBC 4.19   HGB 13.7   HCT 40.3   MCV 96   MCH 32.7   MCHC 34.0   RDW 12.9          BMP  Recent Labs   Lab Test 09/23/19  1535      POTASSIUM 3.9   MARLENI 9.1   CHLORIDE 102   CO2 26   BUN 13   CR 0.61   GLC 93       LFTs  Recent Labs   Lab Test 09/23/19  1535   PROTTOTAL 7.6   ALBUMIN 4.0   BILITOTAL 0.3   ALKPHOS 62   AST 16   ALT 24     Results for orders placed or performed in visit on 09/25/19   MR Abdomen w/o & w Contrast    Narrative    MR ABDOMEN WITHOUT AND WITH CONTRAST    9/25/2019 9:33 AM     HISTORY: Liver lesion, >1 cm, normal liver, no known malignancy. Liver  mass.    TECHNIQUE: Multiplanar, multiecho MRI was performed using T1, T2, and  in- and out-of-phase imaging. Pre- and postcontrast T1 images were  acquired after the administration of 8.5 mL Gadavist IV.    COMPARISON: Correlated with ultrasound the abdomen performed on  9/24/2019.    FINDINGS: 3.0 x 2.5 cm T2 hyperintense lesion is seen within the left  hepatic  lobe and additional 0.9 x 0.9 cm T2 hyperintense lesion is  seen in the right hepatic lobe. Both of these lesions are hypointense  on T1-weighted images. Peripheral, nodular centripetal enhancement is  seen on the postcontrast images (series 11, image 19 and image 43). No  intrahepatic or extrahepatic biliary duct dilatation is present. The  gallbladder is unremarkable.    The spleen, adrenal glands and pancreas are unremarkable. Kidneys are  unremarkable. No hydronephrosis is seen. Stomach is mildly distended  with ingested contents and air. Small and large bowel loops are  nondilated. Abdominal aorta and IVC are of normal course and caliber.  No retroperitoneal or mesenteric lymphadenopathy seen.    Visualized lung bases are unremarkable. No suspicious osseous lesion  seen.      Impression    IMPRESSION:  Hepatic hemangiomas seen in the liver measuring up to 3.0 cm in the  left hepatic lobe.    SABINO TORRES MD     ROS  Constitutional - Denies fevers, weight loss, malaise, lethargy  Neuro - Denies tremors or seizures  Pulmon - Denies SOB, dyspnea, hemoptysis, chronic cough or use of an inhaler  CV - Denies CP, SOB, lower extremity edema, difficulty w/ stairs, has never used NTG  GI - Denies hematemesis, BRBPR, melena, chronic diarrhea or epigastric pain   - Denies hematuria, difficulty voiding, h/o STDs  Hematology - Denies blood clotting disorders, chronic anemias  Dermatology - No melanomas or skin cancers  Rheumatology - No h/o RA  Pysch - Denies depression, bipolar d/o or schizophrenia    Exam:BP (!) 148/88 (BP Location: Right arm, Patient Position: Sitting, Cuff Size: Adult Regular)   Pulse 87   Temp 98.8  F (37.1  C) (Tympanic)   LMP 03/25/2016     General - Alert and Oriented X4, NAD, well nourished  HEENT - Normocephalic, atraumatic, PERRLA, Nose midline  Lungs - Clear to auscultation bilaterally with good inspiratory effort, no tactile fremitus  CV - Heart RRR, no lift's, thrills, murmurs, rubs, or  gallops. Carotid, radial, and femoral pulses 2+ bilaterally  Abdomen - Soft, non-tender, +BS, no hepatosplenomegaly, no palpable masses  Neuro - Full ROM, Strength 5/5 and major muscle groups, sensation intact  Extremities - No cyanosis, clubbing or edema    Assessment and plan: 53-year-old female with hepatic hemangioma found incidentally on ultrasound and MRI.  Fortunately this is relatively small at 3 cm.  It is likely asymptomatic but we need to work-up other causes.  I have ordered a HIDA scan to take a look at the function of her gallbladder and will schedule her for an upper endoscopy later this month when she is already scheduled to have a colonoscopy.  If all these are negative and she continues to have symptoms, I will refer her for evaluation by the hepatobiliary surgeries at the [facility] to see if this hemangioma could be the cause of her pain.  At the very least, she will need repeat imaging in 6 to 12 months to make sure that this is not enlarging.

## 2019-10-03 ENCOUNTER — MYC MEDICAL ADVICE (OUTPATIENT)
Dept: FAMILY MEDICINE | Facility: CLINIC | Age: 54
End: 2019-10-03
Payer: COMMERCIAL

## 2019-10-17 ENCOUNTER — MYC MEDICAL ADVICE (OUTPATIENT)
Dept: FAMILY MEDICINE | Facility: CLINIC | Age: 54
End: 2019-10-17
Payer: COMMERCIAL

## 2019-10-17 ENCOUNTER — HOSPITAL ENCOUNTER (OUTPATIENT)
Dept: NUCLEAR MEDICINE | Facility: CLINIC | Age: 54
Setting detail: NUCLEAR MEDICINE
Discharge: HOME | End: 2019-10-17
Attending: SURGERY | Admitting: SURGERY
Payer: COMMERCIAL

## 2019-10-17 DIAGNOSIS — R10.11 RIGHT UPPER QUADRANT ABDOMINAL PAIN: ICD-10-CM

## 2019-10-17 PROCEDURE — A9537 TC99M MEBROFENIN: HCPCS | Performed by: SURGERY

## 2019-10-17 PROCEDURE — 78227 HEPATOBIL SYST IMAGE W/DRUG: CPT

## 2019-10-17 PROCEDURE — 25800030 ZZH RX IP 258 OP 636: Performed by: SURGERY

## 2019-10-17 PROCEDURE — 34300033 ZZH RX 343: Performed by: SURGERY

## 2019-10-17 PROCEDURE — 25000128 H RX IP 250 OP 636: Performed by: SURGERY

## 2019-10-17 RX ORDER — KIT FOR THE PREPARATION OF TECHNETIUM TC 99M MEBROFENIN 45 MG/10ML
4.2 INJECTION, POWDER, LYOPHILIZED, FOR SOLUTION INTRAVENOUS ONCE
Status: COMPLETED | OUTPATIENT
Start: 2019-10-17 | End: 2019-10-17

## 2019-10-17 RX ADMIN — SODIUM CHLORIDE 1.5 MCG: 9 INJECTION, SOLUTION INTRAMUSCULAR; INTRAVENOUS; SUBCUTANEOUS at 09:25

## 2019-10-17 RX ADMIN — MEBROFENIN 4.2 MILLICURIE: 45 INJECTION, POWDER, LYOPHILIZED, FOR SOLUTION INTRAVENOUS at 08:09

## 2019-10-24 ENCOUNTER — ANESTHESIA EVENT (OUTPATIENT)
Dept: GASTROENTEROLOGY | Facility: CLINIC | Age: 54
End: 2019-10-24
Payer: COMMERCIAL

## 2019-10-24 NOTE — ANESTHESIA PREPROCEDURE EVALUATION
Anesthesia Pre-Procedure Evaluation    Patient: Shaista Villar   MRN: [id number] : 1965          Preoperative Diagnosis: 5 yr follow up   screening    Procedure(s):  COLONOSCOPY  ESOPHAGOGASTRODUODENOSCOPY (EGD)    Past Medical History:   Diagnosis Date     ASCUS on Pap smear 2005    (negative) HPV     Mild intermittent asthma      SEASONAL ALLERGIES      Past Surgical History:   Procedure Laterality Date     APPENDECTOMY       COLONOSCOPY  2014    Procedure: COMBINED COLONOSCOPY, SINGLE BIOPSY/POLYPECTOMY BY BIOPSY;  Surgeon: Ridge Duke MD;  Location: [facility]     SURGICAL HISTORY OF -       [facility].       Anesthesia Evaluation     . Pt has had prior anesthetic. Type: General and MAC           ROS/MED HX    ENT/Pulmonary:     (+)allergic rhinitis, Intermittent asthma , . .    Neurologic:  - neg neurologic ROS     Cardiovascular:  - neg cardiovascular ROS       METS/Exercise Tolerance:     Hematologic:  - neg hematologic  ROS       Musculoskeletal:  - neg musculoskeletal ROS       GI/Hepatic:  - neg GI/hepatic ROS       Renal/Genitourinary:  - ROS Renal section negative       Endo:     (+) Obesity, .      Psychiatric:  - neg psychiatric ROS       Infectious Disease:  - neg infectious disease ROS       Malignancy:      - no malignancy   Other:    - neg other ROS                      Physical Exam  Normal systems: cardiovascular, pulmonary and dental    Airway   Mallampati: II  TM distance: >3 FB  Neck ROM: full    Dental     Cardiovascular       Pulmonary             Lab Results   Component Value Date    WBC 6.4 2015    HGB 13.7 2015    HCT 40.3 2015     2015     2019    POTASSIUM 3.9 2019    CHLORIDE 102 2019    CO2 26 2019    BUN 13 2019    CR 0.61 2019    GLC 93 2019    MARLENI 9.1 2019    ALBUMIN 4.0 2019    PROTTOTAL 7.6 2019    ALT 24 2019    AST 16 2019    ALKPHOS 62 2019     "BILITOTAL 0.3 09/23/2019    TSH 1.40 07/22/2019    HCG Negative 05/12/2014       Preop Vitals  BP Readings from Last 3 Encounters:   10/02/19 (!) 148/88   09/23/19 132/86   07/22/19 124/80    Pulse Readings from Last 3 Encounters:   10/02/19 87   09/23/19 78   07/22/19 72      Resp Readings from Last 3 Encounters:   09/23/19 16   07/22/19 16   04/05/19 18    SpO2 Readings from Last 3 Encounters:   09/23/19 97%   07/22/19 100%   04/05/19 99%      Temp Readings from Last 1 Encounters:   10/02/19 37.1  C (98.8  F) (Tympanic)    Ht Readings from Last 1 Encounters:   07/22/19 1.632 m (5' 4.25\")      Wt Readings from Last 1 Encounters:   09/23/19 81.7 kg (180 lb 3.2 oz)    Estimated body mass index is 30.69 kg/m  as calculated from the following:    Height as of 7/22/19: 1.632 m (5' 4.25\").    Weight as of 9/23/19: 81.7 kg (180 lb 3.2 oz).       Anesthesia Plan      History & Physical Review  History and physical reviewed and following examination; no interval change.    ASA Status:  2 .    NPO Status:  > 6 hours    Plan for General and MAC with Propofol and Intravenous induction. Maintenance will be TIVA.  Reason for MAC:  Deep or markedly invasive procedure (G8)  PONV prophylaxis:  Ondansetron (or other 5HT-3) and Dexamethasone or Solumedrol       Postoperative Care  Postoperative pain management:  Multi-modal analgesia.      Consents  Anesthetic plan, risks, benefits and alternatives discussed with:  Patient..                 NICOL Lake CRNA  "

## 2019-10-28 ENCOUNTER — HOSPITAL ENCOUNTER (OUTPATIENT)
Facility: CLINIC | Age: 54
Discharge: HOME | End: 2019-10-28
Attending: SURGERY | Admitting: SURGERY
Payer: COMMERCIAL

## 2019-10-28 ENCOUNTER — ANESTHESIA (OUTPATIENT)
Dept: GASTROENTEROLOGY | Facility: CLINIC | Age: 54
End: 2019-10-28
Payer: COMMERCIAL

## 2019-10-28 VITALS
RESPIRATION RATE: 18 BRPM | HEART RATE: 63 BPM | OXYGEN SATURATION: 98 % | DIASTOLIC BLOOD PRESSURE: 76 MMHG | HEIGHT: 64 IN | BODY MASS INDEX: 30.73 KG/M2 | WEIGHT: 180 LBS | SYSTOLIC BLOOD PRESSURE: 120 MMHG

## 2019-10-28 DIAGNOSIS — D18.03 HEPATIC HEMANGIOMA: Primary | ICD-10-CM

## 2019-10-28 LAB
COLONOSCOPY: NORMAL
UPPER GI ENDOSCOPY: NORMAL

## 2019-10-28 PROCEDURE — 25000128 H RX IP 250 OP 636: Performed by: NURSE ANESTHETIST, CERTIFIED REGISTERED

## 2019-10-28 PROCEDURE — 45385 COLONOSCOPY W/LESION REMOVAL: CPT | Performed by: SURGERY

## 2019-10-28 PROCEDURE — 45384 COLONOSCOPY W/LESION REMOVAL: CPT | Mod: PT | Performed by: SURGERY

## 2019-10-28 PROCEDURE — 25000132 ZZH RX MED GY IP 250 OP 250 PS 637: Performed by: SURGERY

## 2019-10-28 PROCEDURE — 88305 TISSUE EXAM BY PATHOLOGIST: CPT | Performed by: SURGERY

## 2019-10-28 PROCEDURE — 25000125 ZZHC RX 250: Performed by: NURSE ANESTHETIST, CERTIFIED REGISTERED

## 2019-10-28 PROCEDURE — 25000125 ZZHC RX 250: Performed by: SURGERY

## 2019-10-28 PROCEDURE — 43235 EGD DIAGNOSTIC BRUSH WASH: CPT | Mod: 51 | Performed by: SURGERY

## 2019-10-28 PROCEDURE — 25800030 ZZH RX IP 258 OP 636: Performed by: SURGERY

## 2019-10-28 PROCEDURE — 43235 EGD DIAGNOSTIC BRUSH WASH: CPT | Performed by: SURGERY

## 2019-10-28 PROCEDURE — 88305 TISSUE EXAM BY PATHOLOGIST: CPT | Mod: 26 | Performed by: SURGERY

## 2019-10-28 PROCEDURE — 37000008 ZZH ANESTHESIA TECHNICAL FEE, 1ST 30 MIN: Performed by: SURGERY

## 2019-10-28 RX ORDER — ONDANSETRON 2 MG/ML
4 INJECTION INTRAMUSCULAR; INTRAVENOUS
Status: DISCONTINUED | OUTPATIENT
Start: 2019-10-28 | End: 2019-10-28 | Stop reason: HOSPADM

## 2019-10-28 RX ORDER — SODIUM CHLORIDE, SODIUM LACTATE, POTASSIUM CHLORIDE, CALCIUM CHLORIDE 600; 310; 30; 20 MG/100ML; MG/100ML; MG/100ML; MG/100ML
INJECTION, SOLUTION INTRAVENOUS CONTINUOUS
Status: DISCONTINUED | OUTPATIENT
Start: 2019-10-28 | End: 2019-10-28 | Stop reason: HOSPADM

## 2019-10-28 RX ORDER — GLYCOPYRROLATE 0.2 MG/ML
INJECTION, SOLUTION INTRAMUSCULAR; INTRAVENOUS PRN
Status: DISCONTINUED | OUTPATIENT
Start: 2019-10-28 | End: 2019-10-28

## 2019-10-28 RX ORDER — LIDOCAINE 40 MG/G
CREAM TOPICAL
Status: DISCONTINUED | OUTPATIENT
Start: 2019-10-28 | End: 2019-10-28 | Stop reason: HOSPADM

## 2019-10-28 RX ORDER — PROPOFOL 10 MG/ML
INJECTION, EMULSION INTRAVENOUS CONTINUOUS PRN
Status: DISCONTINUED | OUTPATIENT
Start: 2019-10-28 | End: 2019-10-28

## 2019-10-28 RX ORDER — PROPOFOL 10 MG/ML
INJECTION, EMULSION INTRAVENOUS PRN
Status: DISCONTINUED | OUTPATIENT
Start: 2019-10-28 | End: 2019-10-28

## 2019-10-28 RX ORDER — SIMETHICONE 40MG/0.6ML
SUSPENSION, DROPS(FINAL DOSAGE FORM)(ML) ORAL PRN
Status: DISCONTINUED | OUTPATIENT
Start: 2019-10-28 | End: 2019-10-28 | Stop reason: HOSPADM

## 2019-10-28 RX ORDER — LIDOCAINE HYDROCHLORIDE 10 MG/ML
INJECTION, SOLUTION INFILTRATION; PERINEURAL PRN
Status: DISCONTINUED | OUTPATIENT
Start: 2019-10-28 | End: 2019-10-28

## 2019-10-28 RX ADMIN — PROPOFOL 200 MCG/KG/MIN: 10 INJECTION, EMULSION INTRAVENOUS at 07:56

## 2019-10-28 RX ADMIN — PROPOFOL 100 MG: 10 INJECTION, EMULSION INTRAVENOUS at 07:56

## 2019-10-28 RX ADMIN — GLYCOPYRROLATE 0.2 MG: 0.2 INJECTION, SOLUTION INTRAMUSCULAR; INTRAVENOUS at 07:56

## 2019-10-28 RX ADMIN — SODIUM CHLORIDE, POTASSIUM CHLORIDE, SODIUM LACTATE AND CALCIUM CHLORIDE: 600; 310; 30; 20 INJECTION, SOLUTION INTRAVENOUS at 06:59

## 2019-10-28 RX ADMIN — LIDOCAINE HYDROCHLORIDE 50 MG: 10 INJECTION, SOLUTION INFILTRATION; PERINEURAL at 07:56

## 2019-10-28 ASSESSMENT — MIFFLIN-ST. JEOR: SCORE: 1396.47

## 2019-10-28 NOTE — H&P
53 year old year old female here for upper and lower endoscopy for screening        Patient Active Problem List   Diagnosis     Mild intermittent asthma     Contraceptive management     CARDIOVASCULAR SCREENING; LDL GOAL LESS THAN 160     Polyp of colon, adenomatous     Menopause     Chronic seasonal allergic rhinitis due to other allergen       Past Medical History:   Diagnosis Date     ASCUS on Pap smear 4/2005    (negative) HPV     Mild intermittent asthma      SEASONAL ALLERGIES        Past Surgical History:   Procedure Laterality Date     APPENDECTOMY       COLONOSCOPY  5/12/2014    Procedure: COMBINED COLONOSCOPY, SINGLE BIOPSY/POLYPECTOMY BY BIOPSY;  Surgeon: Ridge Duke MD;  Location: [facility]     SURGICAL HISTORY OF -   1991    [facility].       Family History   Problem Relation Age of Onset     Cardiovascular Maternal Grandfather      Allergies Brother      Respiratory Brother         asthma     Asthma Brother      Glaucoma Brother         glaucoma     Respiratory Father         COPD     Cancer - colorectal Father 76     Diabetes Maternal Grandmother      Hypertension Paternal Grandmother      Hypertension Brother        No current outpatient medications on file.       Allergies   Allergen Reactions     Codeine      Tongue swelled     Darvocet [Acetaminophen] Nausea and Vomiting     Erythromycin Nausea and Vomiting     Sulfa Drugs Nausea and Vomiting       Pt reports that she has never smoked. She has never used smokeless tobacco. She reports current alcohol use. She reports that she does not use drugs.    Exam:    Awake, Alert OX3  Lungs - CTA bilaterally  CV - RRR, no murmurs, distal pulses intact  Abd - soft, non-distended, non-tender, +BS  Extr - No cyanosis or edema    A/P 53 year old year old female in need of upper and lower endoscopy for screening. Risks, benefits, alternatives, and complications were discussed including the possibility of perforation and the patient agreed to proceed.    John HIGGINS  Sam BAKER

## 2019-10-28 NOTE — ANESTHESIA POSTPROCEDURE EVALUATION
Patient: Shaista Villar    Procedure(s):  COLONOSCOPY, FLEXIBLE, WITH LESION REMOVAL USING SNARE  ESOPHAGOGASTRODUODENOSCOPY (EGD)    Diagnosis:5 yr follow up   screening  Diagnosis Additional Information: No value filed.    Anesthesia Type:  General, MAC    Note:  Anesthesia Post Evaluation    Patient location during evaluation: Phase 2 and Bedside  Patient participation: Able to fully participate in evaluation  Level of consciousness: awake and alert  Pain management: adequate  Airway patency: patent  Cardiovascular status: acceptable and hemodynamically stable  Respiratory status: acceptable and room air  Hydration status: acceptable  PONV: none     Anesthetic complications: None          Last vitals:  Vitals:    10/28/19 0658   BP: 130/84   Pulse: 82   Resp: 16   SpO2: 96%         Electronically Signed By: NICOL Hancock CRNA  October 28, 2019  8:20 AM

## 2019-10-28 NOTE — OP NOTE
EGD and Colonoscopy - Normal esophagus, stomach and duodenum. Colon with single small polyp removed.

## 2019-10-28 NOTE — ANESTHESIA CARE TRANSFER NOTE
Patient: Shaista Villar    Procedure(s):  COLONOSCOPY  ESOPHAGOGASTRODUODENOSCOPY (EGD)    Diagnosis: 5 yr follow up   screening  Diagnosis Additional Information: No value filed.    Anesthesia Type:   General, MAC     Note:  Airway :Room Air  Patient transferred to:Phase II  Handoff Report: Identifed the Patient, Identified the Reponsible Provider, Reviewed the pertinent medical history, Discussed the surgical course, Reviewed Intra-OP anesthesia mangement and issues during anesthesia, Set expectations for post-procedure period and Allowed opportunity for questions and acknowledgement of understanding      Vitals: (Last set prior to Anesthesia Care Transfer)    CRNA VITALS  10/28/2019 0744 - 10/28/2019 0815      10/28/2019             Pulse:  69    Ht Rate:  68    SpO2:  98 %                Electronically Signed By: NICOL Hancock CRNA  October 28, 2019  8:15 AM

## 2019-11-03 ENCOUNTER — HEALTH MAINTENANCE LETTER (OUTPATIENT)
Age: 54
End: 2019-11-03

## 2019-11-03 LAB — COPATH REPORT: NORMAL

## 2019-11-11 ENCOUNTER — ALLIED HEALTH/NURSE VISIT (OUTPATIENT)
Dept: FAMILY MEDICINE | Facility: CLINIC | Age: 54
End: 2019-11-11
Payer: COMMERCIAL

## 2019-11-11 DIAGNOSIS — Z23 NEED FOR VACCINATION: Primary | ICD-10-CM

## 2019-11-11 PROCEDURE — 90750 HZV VACC RECOMBINANT IM: CPT

## 2019-11-11 PROCEDURE — 90471 IMMUNIZATION ADMIN: CPT

## 2019-11-11 NOTE — NURSING NOTE
"Chief Complaint   Patient presents with     Imm/Inj     Shingrix #2       Initial LMP 03/25/2016  Estimated body mass index is 30.9 kg/m  as calculated from the following:    Height as of 10/28/19: 1.626 m (5' 4\").    Weight as of 10/28/19: 81.6 kg (180 lb).  Prior to immunization administration, verified patients identity using patient s name and date of birth. Please see Immunization Activity for additional information.     Screening Questionnaire for Adult Immunization    Are you sick today?   No   Do you have allergies to medications, food, a vaccine component or latex?   Yes   Have you ever had a serious reaction after receiving a vaccination?   No   Do you have a long-term health problem with heart disease, lung disease, asthma, kidney disease, metabolic disease (e.g. diabetes), anemia, or other blood disorder?   Yes   Do you have cancer, leukemia, HIV/AIDS, or any other immune system problem?   No   In the past 3 months, have you taken medications that affect  your immune system, such as prednisone, other steroids, or anticancer drugs; drugs for the treatment of rheumatoid arthritis, Crohn s disease, or psoriasis; or have you had radiation treatments?   No   Have you had a seizure, or a brain or other nervous system problem?   No   During the past year, have you received a transfusion of blood or blood     products, or been given immune (gamma) globulin or antiviral drug?   No   For women: Are you pregnant or is there a chance you could become        pregnant during the next month?   No   Have you received any vaccinations in the past 4 weeks?   No     Immunization questionnaire was positive for at least one answer.  Notified  No asthma sx today and allergic to just codeine and darvon.        Per orders of  , injection of shingrix given by Katarina Zavala CMA. Patient instructed to remain in clinic for 15 minutes afterwards, and to report any adverse reaction to me immediately.     Name and birth date " verified before injection given.    Screening performed by Katarina Zavala CMA on 11/11/2019 at 9:07 AM.

## 2019-11-25 ENCOUNTER — OFFICE VISIT (OUTPATIENT)
Dept: FAMILY MEDICINE | Facility: CLINIC | Age: 54
End: 2019-11-25
Payer: COMMERCIAL

## 2019-11-25 VITALS
SYSTOLIC BLOOD PRESSURE: 138 MMHG | RESPIRATION RATE: 16 BRPM | BODY MASS INDEX: 30.73 KG/M2 | TEMPERATURE: 98.6 F | HEART RATE: 80 BPM | WEIGHT: 179 LBS | DIASTOLIC BLOOD PRESSURE: 84 MMHG

## 2019-11-25 DIAGNOSIS — R07.89 RIGHT-SIDED CHEST WALL PAIN: ICD-10-CM

## 2019-11-25 PROCEDURE — 99214 OFFICE O/P EST MOD 30 MIN: CPT | Performed by: FAMILY MEDICINE

## 2019-11-25 NOTE — PROGRESS NOTES
"Subjective     Shaista Villar is a 53 year old female who presents to clinic today for the following health issues:    HPI   Hemangioma follow up  Reviewed MRI results from [place] with patient       She is working with chiro on the chest wall pain on the right   It is getting better  She has less sympotms if she is standing or if she is laying still sitting seems totrigger her pain   She has not gotten worse       No SOA and no new symptoms       Reviewed and updated as needed this visit by Provider  Tobacco  Allergies  Meds  Problems  Med Hx  Surg Hx  Fam Hx         Review of Systems   ROS COMP: Constitutional, HEENT, cardiovascular, pulmonary, gi and gu systems are negative, except as otherwise noted.      Objective    /84 (BP Location: Right arm, Patient Position: Chair, Cuff Size: Adult Large)   Pulse 80   Temp 98.6  F (37  C) (Tympanic)   Resp 16   Wt 81.2 kg (179 lb)   LMP 03/25/2016   BMI 30.73 kg/m    Body mass index is 30.73 kg/m .  Physical Exam   GENERAL APPEARANCE: healthy, alert and no distress  RESP: lungs clear to auscultation - no rales, rhonchi or wheezes  CV: regular rates and rhythm, normal S1 S2, no S3 or S4 and no murmur, click or rub  PSYCH: mentation appears normal and affect normal/bright    Diagnostic Test Results:  Labs reviewed in Epic        Assessment & Plan     1. Right-sided chest wall pain  Improving     25 minutes spent with this patient greater than 50% in face to face counseling regarding the above.        BMI:   Estimated body mass index is 30.73 kg/m  as calculated from the following:    Height as of 10/28/19: 1.626 m (5' 4\").    Weight as of this encounter: 81.2 kg (179 lb).   Weight management plan: Discussed healthy diet and exercise guidelines        Patient Instructions   Continue with chiro.    Give this 1-2 months and if not better let me know       Return in about 2 months (around 1/25/2020).    Deanna Cagle MD  [facility] " [facility]

## 2019-11-25 NOTE — NURSING NOTE
"Chief Complaint   Patient presents with     Hemangioma Follow Up       Initial /84 (BP Location: Right arm, Patient Position: Chair, Cuff Size: Adult Large)   Pulse 80   Temp 98.6  F (37  C) (Tympanic)   Resp 16   Wt 81.2 kg (179 lb)   LMP 03/25/2016   BMI 30.73 kg/m   Estimated body mass index is 30.73 kg/m  as calculated from the following:    Height as of 10/28/19: 1.626 m (5' 4\").    Weight as of this encounter: 81.2 kg (179 lb).    Patient presents to the clinic using No DME    Health Maintenance that is potentially due pending provider review:  NONE    n/a    Is there anyone who you would like to be able to receive your results? No  If yes have patient fill out ROSALINDA    "

## 2020-02-24 ENCOUNTER — OFFICE VISIT (OUTPATIENT)
Dept: FAMILY MEDICINE | Facility: CLINIC | Age: 55
End: 2020-02-24
Payer: COMMERCIAL

## 2020-02-24 VITALS
HEIGHT: 64 IN | WEIGHT: 181 LBS | DIASTOLIC BLOOD PRESSURE: 82 MMHG | RESPIRATION RATE: 15 BRPM | BODY MASS INDEX: 30.9 KG/M2 | TEMPERATURE: 98 F | HEART RATE: 91 BPM | OXYGEN SATURATION: 98 % | SYSTOLIC BLOOD PRESSURE: 122 MMHG

## 2020-02-24 DIAGNOSIS — M72.2 PLANTAR FASCIITIS, LEFT: Primary | ICD-10-CM

## 2020-02-24 DIAGNOSIS — H93.13 TINNITUS, BILATERAL: ICD-10-CM

## 2020-02-24 PROCEDURE — 99214 OFFICE O/P EST MOD 30 MIN: CPT | Performed by: FAMILY MEDICINE

## 2020-02-24 ASSESSMENT — MIFFLIN-ST. JEOR: SCORE: 1396.01

## 2020-02-24 NOTE — NURSING NOTE
"Chief Complaint   Patient presents with     Foot Problems     Ear Problem       Initial /82 (BP Location: Right arm, Patient Position: Chair, Cuff Size: Adult Regular)   Pulse 91   Temp 98  F (36.7  C) (Tympanic)   Resp 15   Ht 1.626 m (5' 4\")   Wt 82.1 kg (181 lb)   LMP 03/25/2016   SpO2 98%   BMI 31.07 kg/m   Estimated body mass index is 31.07 kg/m  as calculated from the following:    Height as of this encounter: 1.626 m (5' 4\").    Weight as of this encounter: 82.1 kg (181 lb).    Patient presents to the clinic using No DME    Health Maintenance that is potentially due pending provider review:  ACT    n/a    Is there anyone who you would like to be able to receive your results? No  If yes have patient fill out ROSALINDA    "

## 2020-02-24 NOTE — PROGRESS NOTES
"Subjective     Shaista Villar is a 54 year old female who presents to clinic today for the following health issues:    HPI   Musculoskeletal problem/pain      Duration: Fall 2019    Description  Location: left heel    Intensity:  moderate, 6/10    Accompanying signs and symptoms: none    History  Previous similar problem: YES  Previous evaluation:  none    Precipitating or alleviating factors:  Trauma or overuse: YES- overuse in summer  Aggravating factors include: walking and not wearing shoes    Therapies tried and outcome: stretching and insert    Ear problem      Duration: off and on for years.  Worse since September constant    Description (location/character/radiation): buzzing in both ears    Does not feel she has hearing loss but not sure really     No pain no drainage    Has allergies and always thought it was related to these    Feels full in her ears off and on     Intensity:  moderate    Accompanying signs and symptoms: none    History (similar episodes/previous evaluation): None    Precipitating or alleviating factors: None    Therapies tried and outcome: Claritin D               Reviewed and updated as needed this visit by Provider  Tobacco  Allergies  Meds  Problems  Med Hx  Surg Hx  Fam Hx         Review of Systems   ROS COMP: Constitutional, HEENT, cardiovascular, pulmonary, gi and gu systems are negative, except as otherwise noted.      Objective    /82 (BP Location: Right arm, Patient Position: Chair, Cuff Size: Adult Regular)   Pulse 91   Temp 98  F (36.7  C) (Tympanic)   Resp 15   Ht 1.626 m (5' 4\")   Wt 82.1 kg (181 lb)   LMP 03/25/2016   SpO2 98%   BMI 31.07 kg/m    Body mass index is 31.07 kg/m .  Physical Exam   GENERAL APPEARANCE: healthy, alert and no distress  HENT: ear canals and TM's normal and nose and mouth without ulcers or lesions  RESP: lungs clear to auscultation - no rales, rhonchi or wheezes  CV: regular rates and rhythm, normal S1 S2, no S3 or S4 and no " murmur, click or rub  ORTHO: Foot Exam: Inspection:  no swelling  Tender::calcaneous   Non-tender:  Range of Motion:flexion of toes:  full, extension of toes  full    PSYCH: mentation appears normal and affect normal/bright    Diagnostic Test Results:  Labs reviewed in Epic        Assessment & Plan     1. Plantar fasciitis, left  Flare   - Ankle/Foot Bracing Supplies Order    2. Tinnitus, bilateral  Ongoing seems to be getting worse  - AUDIOLOGY ADULT REFERRAL; Future  - OTOLARYNGOLOGY REFERRAL       Patient Instructions   Set up ENT and audiology     wear the night spling at night           Return for Physical Exam.      Risks, benefits, side effects and rationale for treatment plan fully discussed with the patient and understanding expressed.   Deanna Cagle MD  [facility]      DME (Durable Medical Equipment) Orders and Documentation  Orders Placed This Encounter   Procedures     Ankle/Foot Bracing Supplies Order      The patient was assessed and it was determined the patient is in need of the following listed DME Supplies/Equipment. Please complete supporting documentation below to demonstrate medical necessity.      Ankle/Foot Bracing Supplies Documentation  Patient requires the use of the ordered bracing device due to following medical need/condition: Plantar fascitis

## 2020-02-25 ASSESSMENT — ASTHMA QUESTIONNAIRES: ACT_TOTALSCORE: 21

## 2020-04-03 ENCOUNTER — VIRTUAL VISIT (OUTPATIENT)
Dept: FAMILY MEDICINE | Facility: CLINIC | Age: 55
End: 2020-04-03
Payer: COMMERCIAL

## 2020-04-03 ENCOUNTER — MYC MEDICAL ADVICE (OUTPATIENT)
Dept: FAMILY MEDICINE | Facility: CLINIC | Age: 55
End: 2020-04-03

## 2020-04-03 DIAGNOSIS — J01.01 ACUTE RECURRENT MAXILLARY SINUSITIS: Primary | ICD-10-CM

## 2020-04-03 PROCEDURE — 99213 OFFICE O/P EST LOW 20 MIN: CPT | Mod: TEL | Performed by: NURSE PRACTITIONER

## 2020-04-03 RX ORDER — DOXYCYCLINE 100 MG/1
100 CAPSULE ORAL 2 TIMES DAILY
Qty: 14 CAPSULE | Refills: 0 | Status: SHIPPED | OUTPATIENT
Start: 2020-04-03 | End: 2020-05-08

## 2020-04-03 NOTE — PROGRESS NOTES
"Subjective     Shaista Villar is a 54 year old female who is being evaluated via a billable telephone visit.      The patient has been notified of following:     \"This telephone visit will be conducted via a call between you and your physician/provider. We have found that certain health care needs can be provided without the need for a physical exam.  This service lets us provide the care you need with a short phone conversation.  If a prescription is necessary we can send it directly to your pharmacy.  If lab work is needed we can place an order for that and you can then stop by our lab to have the test done at a later time.    If during the course of the call the physician/provider feels a telephone visit is not appropriate, you will not be charged for this service.\"     Patient has given verbal consent for Telephone visit?  Yes    Shaista Villar complains of   Chief Complaint   Patient presents with     Sinus Problem       ALLERGIES  Codeine; Darvocet [acetaminophen]; Erythromycin; and Sulfa drugs    ENT Symptoms             Symptoms: cc Present Absent Comment   Fever/Chills  x  Chills on and off    Fatigue  x     Muscle Aches   x    Eye Irritation  x     Sneezing   x    Nasal Gerardo/Drg  x     Sinus Pressure/Pain x   HA   Loss of smell   x    Dental pain  x     Sore Throat  x  From drainage    Swollen Glands  x     Ear Pain/Fullness  x  Fullness    Cough  x  Pt states has asthma with allergies this is normal for her    Wheeze   x    Chest Pain   x    Shortness of breath   x    Rash   x    Other   x      Symptom duration:  3 weeks, worsening x 1 week    Symptom severity:  mild    Treatments tried:  Sudafed, tylenol , albuterol inhaler    Contacts:  none      Worsening pressure and pain with bending over and maxillary and ethmoid area  Teeth hurt also     Patient Active Problem List   Diagnosis     Mild intermittent asthma     Contraceptive management     CARDIOVASCULAR SCREENING; LDL GOAL LESS THAN 160     Polyp of " colon, adenomatous     Menopause     Chronic seasonal allergic rhinitis due to other allergen     Right-sided chest wall pain     Past Surgical History:   Procedure Laterality Date     APPENDECTOMY       COLONOSCOPY  5/12/2014    Procedure: COMBINED COLONOSCOPY, SINGLE BIOPSY/POLYPECTOMY BY BIOPSY;  Surgeon: Ridge Duke MD;  Location: [facility]     ESOPHAGOSCOPY, GASTROSCOPY, DUODENOSCOPY (EGD), COMBINED N/A 10/28/2019    Procedure: ESOPHAGOGASTRODUODENOSCOPY (EGD);  Surgeon: John Vargas MD;  Location: [facility]     SURGICAL HISTORY OF -   1991    [facility].       Social History     Tobacco Use     Smoking status: Never Smoker     Smokeless tobacco: Never Used   Substance Use Topics     Alcohol use: Yes     Comment: occas     Family History   Problem Relation Age of Onset     Cardiovascular Maternal Grandfather      Allergies Brother      Respiratory Brother         asthma     Asthma Brother      Glaucoma Brother         glaucoma     Respiratory Father         COPD     Cancer - colorectal Father 76     Diabetes Maternal Grandmother      Hypertension Paternal Grandmother      Hypertension Brother          Current Outpatient Medications   Medication Sig Dispense Refill     albuterol (PROAIR HFA/PROVENTIL HFA/VENTOLIN HFA) 108 (90 Base) MCG/ACT inhaler Inhale 2 puffs into the lungs every 4 hours as needed for shortness of breath / dyspnea 3 Inhaler 1     CALCIUM-MAGNESIUM 500-250 MG PO TABS 2 TABLETS DAILY       doxycycline monohydrate (MONODOX) 100 MG capsule Take 1 capsule (100 mg) by mouth 2 times daily for 7 days 14 capsule 0     loratadine-pseudoePHEDrine (CLARITIN-D 24-HOUR)  MG 24 hr tablet Take 1 tablet by mouth daily       magnesium 250 MG tablet Take 1 tablet by mouth daily 85 mg       MULTIVITAMIN OR one daily       VITAMIN C 100 MG OR TABS prn       Vitamin D, Cholecalciferol, 1000 UNITS TABS Take 1 tablet by mouth daily       ZINC 10 MG OR TABS prn       fluticasone-salmeterol (ADVAIR-HFA) 115-21  MCG/ACT inhaler Inhale 2 puffs into the lungs 2 times daily (Patient not taking: Reported on 4/3/2020) 1 Inhaler 11     Allergies   Allergen Reactions     Codeine      Tongue swelled     Darvocet [Acetaminophen] Nausea and Vomiting     Erythromycin Nausea and Vomiting     Sulfa Drugs Nausea and Vomiting       Reviewed and updated as needed this visit by Provider  Tobacco  Allergies  Meds  Problems  Med Hx  Surg Hx  Fam Hx         Review of Systems   ROS COMP: Constitutional, HEENT, cardiovascular, pulmonary, gi and gu systems are negative, except as otherwise noted.       Objective   Reported vitals:  [facility] 03/25/2016      Psych: Alert and oriented times 3; coherent speech, normal   rate and volume, able to articulate logical thoughts, able   to abstract reason, no tangential thoughts, no hallucinations   or delusions  Her affect is normal and bright.    Diagnostic Test Results:  none         Assessment/Plan:  1. Acute recurrent maxillary sinusitis  With ongoing symptoms will treat with doxycycline.  Symptomatic care and follow up discussed.  - doxycycline monohydrate (MONODOX) 100 MG capsule; Take 1 capsule (100 mg) by mouth 2 times daily for 7 days  Dispense: 14 capsule; Refill: 0    Home care instructions were reviewed with the patient. The risks, benefits and treatment options of prescribed medications or other treatments have been discussed with the patient. The patient verbalized their understanding and should call or follow up if no improvement or if they develop further problems.      Return in about 1 week (around 4/10/2020), or if symptoms worsen or fail to improve.      Phone call duration:  7 minutes    NICOL Erickson CNP

## 2020-04-30 ENCOUNTER — MYC MEDICAL ADVICE (OUTPATIENT)
Dept: FAMILY MEDICINE | Facility: CLINIC | Age: 55
End: 2020-04-30
Payer: COMMERCIAL

## 2020-04-30 ENCOUNTER — TELEPHONE (OUTPATIENT)
Dept: OTOLARYNGOLOGY | Facility: CLINIC | Age: 55
End: 2020-04-30

## 2020-04-30 NOTE — TELEPHONE ENCOUNTER
Called patient and advised if possibly a perf drum then would most likely need time to heal before Audiology and ENT workup.  Followed previous recommendation from PCP to follow up with them if Abx did not resolve her sinus issues and if worsens.    Pt agreed to follow up with them.    Fani DOE   [facility] RN

## 2020-04-30 NOTE — TELEPHONE ENCOUNTER
Reason for Call:  Other call back    Detailed comments: pt calling stating she was referred to ENT for ringing in ears. Tues and Wed she noticed her L ear feels like it has fluid in it and wet. She used a q-tip and noticed it has blood in her ear. Having a lot of L sides sinus pain and drainage down throat,     Phone Number Patient can be reached at: Home number on file [phone] (home)    Best Time: any     Can we leave a detailed message on this number? YES    Call taken on 4/30/2020 at 7:39 AM by Dalila Machado

## 2020-05-01 ENCOUNTER — OFFICE VISIT (OUTPATIENT)
Dept: FAMILY MEDICINE | Facility: CLINIC | Age: 55
End: 2020-05-01
Payer: COMMERCIAL

## 2020-05-01 ENCOUNTER — OFFICE VISIT (OUTPATIENT)
Dept: AUDIOLOGY | Facility: CLINIC | Age: 55
End: 2020-05-01
Payer: COMMERCIAL

## 2020-05-01 ENCOUNTER — OFFICE VISIT (OUTPATIENT)
Dept: OTOLARYNGOLOGY | Facility: CLINIC | Age: 55
End: 2020-05-01
Payer: COMMERCIAL

## 2020-05-01 VITALS
BODY MASS INDEX: 30.9 KG/M2 | WEIGHT: 180 LBS | HEART RATE: 76 BPM | TEMPERATURE: 98.4 F | SYSTOLIC BLOOD PRESSURE: 164 MMHG | DIASTOLIC BLOOD PRESSURE: 100 MMHG | RESPIRATION RATE: 12 BRPM

## 2020-05-01 VITALS
SYSTOLIC BLOOD PRESSURE: 163 MMHG | BODY MASS INDEX: 30.79 KG/M2 | WEIGHT: 179.4 LBS | OXYGEN SATURATION: 99 % | TEMPERATURE: 98.6 F | DIASTOLIC BLOOD PRESSURE: 139 MMHG | HEART RATE: 90 BPM

## 2020-05-01 DIAGNOSIS — H92.02 OTALGIA, LEFT: Primary | ICD-10-CM

## 2020-05-01 DIAGNOSIS — R03.0 ELEVATED BP WITHOUT DIAGNOSIS OF HYPERTENSION: ICD-10-CM

## 2020-05-01 DIAGNOSIS — H60.392 INFECTIVE OTITIS EXTERNA, LEFT: ICD-10-CM

## 2020-05-01 DIAGNOSIS — H93.13 TINNITUS, BILATERAL: Primary | ICD-10-CM

## 2020-05-01 DIAGNOSIS — H92.02 OTALGIA OF LEFT EAR: ICD-10-CM

## 2020-05-01 PROCEDURE — 99207 ZZC NO CHARGE LOS: CPT | Performed by: AUDIOLOGIST

## 2020-05-01 PROCEDURE — 99243 OFF/OP CNSLTJ NEW/EST LOW 30: CPT | Performed by: OTOLARYNGOLOGY

## 2020-05-01 PROCEDURE — 92557 COMPREHENSIVE HEARING TEST: CPT | Performed by: AUDIOLOGIST

## 2020-05-01 PROCEDURE — 99214 OFFICE O/P EST MOD 30 MIN: CPT | Performed by: FAMILY MEDICINE

## 2020-05-01 PROCEDURE — 92550 TYMPANOMETRY & REFLEX THRESH: CPT | Performed by: AUDIOLOGIST

## 2020-05-01 RX ORDER — NEOMYCIN SULFATE, POLYMYXIN B SULFATE, HYDROCORTISONE 3.5; 10000; 1 MG/ML; [USP'U]/ML; MG/ML
4 SOLUTION/ DROPS AURICULAR (OTIC) 3 TIMES DAILY
Qty: 10 ML | Refills: 0 | Status: SHIPPED | OUTPATIENT
Start: 2020-05-01 | End: 2020-07-06

## 2020-05-01 ASSESSMENT — PAIN SCALES - GENERAL: PAINLEVEL: NO PAIN (0)

## 2020-05-01 NOTE — NURSING NOTE
"Chief Complaint   Patient presents with     Otalgia     left     BP (!) 164/100   Pulse 76   Temp 98.4  F (36.9  C) (Tympanic)   Resp 12   Wt 81.6 kg (180 lb)   LMP 03/25/2016   BMI 30.90 kg/m   Estimated body mass index is 30.9 kg/m  as calculated from the following:    Height as of 2/24/20: 1.626 m (5' 4\").    Weight as of this encounter: 81.6 kg (180 lb).  Patient presents to the clinic using No DME      Health Maintenance that is potentially due pending provider review:    Health Maintenance Due   Topic Date Due     HIV SCREENING  12/04/1978     PNEUMOCOCCAL IMMUNIZATION 19-64 MEDIUM RISK (1 of 1 - PPSV23) 12/04/1982        n/a        "

## 2020-05-01 NOTE — PROGRESS NOTES
AUDIOLOGY REPORT:    Patient was referred to [facility] from ENT by Dr. Connors for a hearing examination. Patient reports left ear otalgia and draining blood for the past 2 days. Patient reports bilateral tinnitus since last fall. Patient was unaccompanied to today's visit.     Testing:    Otoscopy:   Otoscopic exam indicates ears are clear of cerumen bilaterally     Tympanograms:    RIGHT: normal eardrum mobility     LEFT:   normal eardrum mobility    Reflexes (reported by stimulus ear): 1000 Hz  RIGHT: Ipsilateral is present at normal levels  RIGHT: Contralateral is present at normal levels  LEFT:   Ipsilateral is present at normal levels  LEFT:   Contralateral is present at normal levels    Thresholds:   Pure Tone Thresholds assessed using conventional audiometry with good  reliability from 250-8000 Hz bilaterally using insert earphones and circumaural headphones     RIGHT:  normal and borderline-normal hearing sensitivity for all frequencies tested.     LEFT:    normal hearing sensitivity for all frequencies tested.     Speech Reception Threshold:    RIGHT: 15 dB HL    LEFT:   20 dB HL    Word Recognition Score:     RIGHT: 96% at 55 dB HL using NU-6 recorded word list.    LEFT:   96% at 60 dB HL using NU-6 recorded word list.    Discussed results with the patient.     Patient was returned to ENT for follow up.     Valerio Shelley CCC-A  Licensed Audiologist  5/1/2020

## 2020-05-01 NOTE — PATIENT INSTRUCTIONS
Pt to go to [facility] to be seen in ENT today     She will come back for BP brooklynn here after that appt

## 2020-05-01 NOTE — Clinical Note
Thanks for the referral. ENT is starting to open our clinics to a broader group of patients and problems. If you have any troubles getting someone in please call or message me.

## 2020-05-01 NOTE — LETTER
5/1/2020         RE: Shaista Villar  [address] [facility] [address]        Dear Colleague,    Thank you for referring your patient, Shaista Villar, to the [facility]. Please see a copy of my visit note below.    Shaista to follow up with Primary Care provider regarding elevated blood pressure.        I am seeing this patient in consultation for tinnitus at the request of the provider Dr. Deanna Cagle.      Chief Complaint - left ear pain and bloody drainage    History of Present Illness - Shaista Villar is a 54 year old female who presents to me today with hearing loss and pain in the left ear. Also noted left ear bloody drainage. It has been present and noticeable for approximately 3 days. She also has teeth pain. Also pain in left face. Mornings are worse. Has drainage. Was on antibiotics, but is done now for 2-3 weeks. She tried some claritin-D. Never had ear problems before. + tinnitus.  This is been going on for a few months.  Is not overly bothersome.  Some imbalance, but no vertigo. No troubles in the right ear. The patient notes no water exposure or ear canal trauma.     Past Medical History -   Patient Active Problem List   Diagnosis     Mild intermittent asthma     Contraceptive management     CARDIOVASCULAR SCREENING; LDL GOAL LESS THAN 160     Polyp of colon, adenomatous     Menopause     Chronic seasonal allergic rhinitis due to other allergen     Right-sided chest wall pain       Current Medications -   Current Outpatient Medications:      CALCIUM-MAGNESIUM 500-250 MG PO TABS, 2 TABLETS DAILY, Disp: , Rfl:      magnesium 250 MG tablet, Take 1 tablet by mouth daily 85 mg, Disp: , Rfl:      MULTIVITAMIN OR, one daily, Disp: , Rfl:      VITAMIN C 100 MG OR TABS, prn, Disp: , Rfl:      Vitamin D, Cholecalciferol, 1000 UNITS TABS, Take 1 tablet by mouth daily, Disp: , Rfl:      ZINC 10 MG OR TABS, prn, Disp: , Rfl:      albuterol (PROAIR HFA/PROVENTIL HFA/VENTOLIN HFA) 108 (90  Base) MCG/ACT inhaler, Inhale 2 puffs into the lungs every 4 hours as needed for shortness of breath / dyspnea (Patient not taking: Reported on 5/1/2020), Disp: 3 Inhaler, Rfl: 1     fluticasone-salmeterol (ADVAIR-HFA) 115-21 MCG/ACT inhaler, Inhale 2 puffs into the lungs 2 times daily (Patient not taking: Reported on 4/3/2020), Disp: 1 Inhaler, Rfl: 11     loratadine-pseudoePHEDrine (CLARITIN-D 24-HOUR)  MG 24 hr tablet, Take 1 tablet by mouth daily, Disp: , Rfl:     Allergies -   Allergies   Allergen Reactions     Codeine      Tongue swelled     Darvocet [Acetaminophen] Nausea and Vomiting     Erythromycin Nausea and Vomiting     Sulfa Drugs Nausea and Vomiting       Social History -   Social History     Socioeconomic History     Marital status:      Spouse name: None     Number of children: None     Years of education: None     Highest education level: None   Occupational History     None   Social Needs     Financial resource strain: None     Food insecurity     Worry: None     Inability: None     Transportation needs     Medical: None     Non-medical: None   Tobacco Use     Smoking status: Never Smoker     Smokeless tobacco: Never Used   Substance and Sexual Activity     Alcohol use: Yes     Comment: occas     Drug use: No     Sexual activity: Yes     Partners: Male     Birth control/protection: None   Lifestyle     Physical activity     Days per week: None     Minutes per session: None     Stress: None   Relationships     Social connections     Talks on phone: None     Gets together: None     Attends Denominational service: None     Active member of club or organization: None     Attends meetings of clubs or organizations: None     Relationship status: None     Intimate partner violence     Fear of current or ex partner: None     Emotionally abused: None     Physically abused: None     Forced sexual activity: None   Other Topics Concern     Parent/sibling w/ CABG, MI or angioplasty before 65F 55M? No    Social History Narrative     None       Family History -   Family History   Problem Relation Age of Onset     Cardiovascular Maternal Grandfather      Allergies Brother      Respiratory Brother         asthma     Asthma Brother      Glaucoma Brother         glaucoma     Respiratory Father         COPD     Cancer - colorectal Father 76     Diabetes Maternal Grandmother      Hypertension Paternal Grandmother      Hypertension Brother        Review of Systems - As per HPI and PMHx, otherwise 7 system review of the head and neck negative.    Physical Exam  BP (!) 163/139   Pulse 90   Temp 98.6  F (37  C) (Oral)   Wt 81.4 kg (179 lb 6.4 oz)   LMP 03/25/2016   SpO2 99%   BMI 30.79 kg/m    General - The patient is in no distress.  Alert and oriented to person and place, answers questions and cooperates with examination appropriately.   Voice and Breathing - The patient was breathing comfortably without the use of accessory muscles. There was no wheezing, stridor, or stertor.  The patients voice was clear and strong.  Ears - The left ear was examined. It showed a spot  of erythema and/or crust of the floor of the left midcanal. It was tender to look into the canal. I could then see the tympanic membrane. It appeared intact. No evidence of middle ear effusion. The right ear was then examined. The canal was nonedematous and nonerythematous. No evidence of infection. The tympanic membrane was intact and the middle ear was well aerated.   Eyes - Extraocular movements intact.  Sclera were not icteric or injected.  Mouth - Examination of the oral cavity showed pink, healthy mucosa. No lesions or ulcerations noted.  The tongue was mobile and midline.  Throat - The walls of the oropharynx were smooth, symmetric, and had no lesions or ulcerations.  The tonsillar pillars and soft palate were symmetric.  The uvula was midline on elevation.    Neck - left pretragal tenderness. No erythema. Palpation of the occipital, submental,  submandibular, internal jugular chain, and supraclavicular nodes did not demonstrateany abnormal lymph nodes or masses. No parotid masses. Palpation of the thyroid was soft and smooth, with no nodules or goiter appreciated.  The trachea was mobile and midline.  Neurological - Cranial nerves 2 through 12 were grossly intact. House-Brackmann grade 1 out of 6 bilaterally.    Audiogram - normal, type A tymps.     Assessment and Plan - Shaista Villar is a 54 year old female has left otalgia.  She has a erythematous lesion may be a crust along the floor the left ear canal.  This is likely the area of bleeding.  I do not see an ominous lesion but I suspect possibly traumatic or brewing otitis externa.  Therefore I will prescribe Cortisporin.  Other things to consider could be the start of herpes zoster or TMJ.  I did give her TMJ precautions.  If things do not improve she will contact me and we can consider CT scan.  She had mentioned some sinus and teeth related pain as well.  Unfortunately she cannot see a dentist at this time.    She also has tinnitus but no significant hearing loss.  We discussed hearing protection in noise.  She can try masking.  If things worsen her hearing worsen she should return for audiogram.    Dayron Connors MD  Otolaryngology  [facility]      Again, thank you for allowing me to participate in the care of your patient.        Sincerely,        Dayron Connors MD

## 2020-05-01 NOTE — PROGRESS NOTES
Subjective     Shaista Villar is a 54 year old female who presents to clinic today for the following health issues:    HPI   ENT Symptoms             Symptoms: cc Present Absent Comment   Fever/Chills  x  chills   Fatigue   x    Muscle Aches   x    Eye Irritation  x     Sneezing  x  Little bit   Nasal Gerardo/Drg  x     Sinus Pressure/Pain  x     Loss of smell   x    Dental pain  x  Left side bottom and top   Sore Throat   x Raw - drainage   Swollen Glands  x  Neck pain   Ear Pain/Fullness x   Wet, blood - left   Cough  x  clearing   Wheeze   x    Chest Pain   x    Shortness of breath   x    Rash   x    Other         Symptom duration:  4 days   Symptom severity:  severe   Treatments tried:  doxycycline in April, claritin D   Contacts:  none     Was seen in February with ringing in ears, had appointment with ENT for March and was cancelled due to Covid  Pt called ENT and they suggested pt see PCP  Was treated for sinus infection in April    She continues to have ringing in both ears worse in the left now   Throbbing pulsating pain in the left ear  She has facial pain and pain in her teeth on the left  She has no hearing on the left  Blood on on Wed and on her pillow this am and Thursday am               BP is high today   She has no chest sxs   No soa   No cough   No dizziness     Reviewed and updated as needed this visit by Provider  Tobacco  Allergies  Meds  Problems  Med Hx  Surg Hx  Fam Hx         Review of Systems   ROS COMP: Constitutional, HEENT, cardiovascular, pulmonary, gi and gu systems are negative, except as otherwise noted.      Objective    BP (!) 164/100   Pulse 76   Temp 98.4  F (36.9  C) (Tympanic)   Resp 12   Wt 81.6 kg (180 lb)   LMP 03/25/2016   BMI 30.90 kg/m    Body mass index is 30.9 kg/m .  Physical Exam   GENERAL APPEARANCE: healthy, alert and no distress  HENT: nose and mouth without ulcers or lesions and right tm normal   Left tm difficult to see the entire tm due to pain with  manipulation of the auricle.   No lesion on the ear canal no discharge   NECK: no adenopathy, no asymmetry, masses, or scars and thyroid normal to palpation  RESP: lungs clear to auscultation - no rales, rhonchi or wheezes  CV: regular rates and rhythm, normal S1 S2, no S3 or S4 and no murmur, click or rub  SKIN: no suspicious lesions or rashes  PSYCH: mentation appears normal and affect normal/bright    Diagnostic Test Results:  Labs reviewed in Epic        Assessment & Plan     1. Otalgia, left  ?perforation vs OE vs other   D/w ENT and she will be seen today     2. Elevated BP without diagnosis of hypertension  Recheck this afternoon start meds if indicated she has had preHTN for a couple of years       Patient Instructions   Pt to go to [facility] to be seen in ENT today     She will come back for BP eval here after that appt       Return in about 1 day (around 5/2/2020) for after ENT come back to office for BP check .     Risks, benefits, side effects and rationale for treatment plan fully discussed with the patient and understanding expressed.     Deanna Cagle MD  [facility]

## 2020-05-01 NOTE — PROGRESS NOTES
I am seeing this patient in consultation for tinnitus at the request of the provider Dr. Deanna Cagle.      Chief Complaint - left ear pain and bloody drainage    History of Present Illness - Shaista Villar is a 54 year old female who presents to me today with hearing loss and pain in the left ear. Also noted left ear bloody drainage. It has been present and noticeable for approximately 3 days. She also has teeth pain. Also pain in left face. Mornings are worse. Has drainage. Was on antibiotics, but is done now for 2-3 weeks. She tried some claritin-D. Never had ear problems before. + tinnitus.  This is been going on for a few months.  Is not overly bothersome.  Some imbalance, but no vertigo. No troubles in the right ear. The patient notes no water exposure or ear canal trauma.     Past Medical History -   Patient Active Problem List   Diagnosis     Mild intermittent asthma     Contraceptive management     CARDIOVASCULAR SCREENING; LDL GOAL LESS THAN 160     Polyp of colon, adenomatous     Menopause     Chronic seasonal allergic rhinitis due to other allergen     Right-sided chest wall pain       Current Medications -   Current Outpatient Medications:      CALCIUM-MAGNESIUM 500-250 MG PO TABS, 2 TABLETS DAILY, Disp: , Rfl:      magnesium 250 MG tablet, Take 1 tablet by mouth daily 85 mg, Disp: , Rfl:      MULTIVITAMIN OR, one daily, Disp: , Rfl:      VITAMIN C 100 MG OR TABS, prn, Disp: , Rfl:      Vitamin D, Cholecalciferol, 1000 UNITS TABS, Take 1 tablet by mouth daily, Disp: , Rfl:      ZINC 10 MG OR TABS, prn, Disp: , Rfl:      albuterol (PROAIR HFA/PROVENTIL HFA/VENTOLIN HFA) 108 (90 Base) MCG/ACT inhaler, Inhale 2 puffs into the lungs every 4 hours as needed for shortness of breath / dyspnea (Patient not taking: Reported on 5/1/2020), Disp: 3 Inhaler, Rfl: 1     fluticasone-salmeterol (ADVAIR-HFA) 115-21 MCG/ACT inhaler, Inhale 2 puffs into the lungs 2 times daily (Patient not taking: Reported on 4/3/2020),  Disp: 1 Inhaler, Rfl: 11     loratadine-pseudoePHEDrine (CLARITIN-D 24-HOUR)  MG 24 hr tablet, Take 1 tablet by mouth daily, Disp: , Rfl:     Allergies -   Allergies   Allergen Reactions     Codeine      Tongue swelled     Darvocet [Acetaminophen] Nausea and Vomiting     Erythromycin Nausea and Vomiting     Sulfa Drugs Nausea and Vomiting       Social History -   Social History     Socioeconomic History     Marital status:      Spouse name: None     Number of children: None     Years of education: None     Highest education level: None   Occupational History     None   Social Needs     Financial resource strain: None     Food insecurity     Worry: None     Inability: None     Transportation needs     Medical: None     Non-medical: None   Tobacco Use     Smoking status: Never Smoker     Smokeless tobacco: Never Used   Substance and Sexual Activity     Alcohol use: Yes     Comment: occas     Drug use: No     Sexual activity: Yes     Partners: Male     Birth control/protection: None   Lifestyle     Physical activity     Days per week: None     Minutes per session: None     Stress: None   Relationships     Social connections     Talks on phone: None     Gets together: None     Attends Scientologist service: None     Active member of club or organization: None     Attends meetings of clubs or organizations: None     Relationship status: None     Intimate partner violence     Fear of current or ex partner: None     Emotionally abused: None     Physically abused: None     Forced sexual activity: None   Other Topics Concern     Parent/sibling w/ CABG, MI or angioplasty before 65F 55M? No   Social History Narrative     None       Family History -   Family History   Problem Relation Age of Onset     Cardiovascular Maternal Grandfather      Allergies Brother      Respiratory Brother         asthma     Asthma Brother      Glaucoma Brother         glaucoma     Respiratory Father         COPD     Cancer - colorectal  Father 76     Diabetes Maternal Grandmother      Hypertension Paternal Grandmother      Hypertension Brother        Review of Systems - As per HPI and PMHx, otherwise 7 system review of the head and neck negative.    Physical Exam  BP (!) 163/139   Pulse 90   Temp 98.6  F (37  C) (Oral)   Wt 81.4 kg (179 lb 6.4 oz)   LMP 03/25/2016   SpO2 99%   BMI 30.79 kg/m    General - The patient is in no distress.  Alert and oriented to person and place, answers questions and cooperates with examination appropriately.   Voice and Breathing - The patient was breathing comfortably without the use of accessory muscles. There was no wheezing, stridor, or stertor.  The patients voice was clear and strong.  Ears - The left ear was examined. It showed a spot  of erythema and/or crust of the floor of the left midcanal. It was tender to look into the canal. I could then see the tympanic membrane. It appeared intact. No evidence of middle ear effusion. The right ear was then examined. The canal was nonedematous and nonerythematous. No evidence of infection. The tympanic membrane was intact and the middle ear was well aerated.   Eyes - Extraocular movements intact.  Sclera were not icteric or injected.  Mouth - Examination of the oral cavity showed pink, healthy mucosa. No lesions or ulcerations noted.  The tongue was mobile and midline.  Throat - The walls of the oropharynx were smooth, symmetric, and had no lesions or ulcerations.  The tonsillar pillars and soft palate were symmetric.  The uvula was midline on elevation.    Neck - left pretragal tenderness. No erythema. Palpation of the occipital, submental, submandibular, internal jugular chain, and supraclavicular nodes did not demonstrateany abnormal lymph nodes or masses. No parotid masses. Palpation of the thyroid was soft and smooth, with no nodules or goiter appreciated.  The trachea was mobile and midline.  Neurological - Cranial nerves 2 through 12 were grossly intact.  House-Brackmann grade 1 out of 6 bilaterally.    Audiogram - normal, type A tymps.     Assessment and Plan - Shaista Villar is a 54 year old female has left otalgia.  She has a erythematous lesion may be a crust along the floor the left ear canal.  This is likely the area of bleeding.  I do not see an ominous lesion but I suspect possibly traumatic or brewing otitis externa.  Therefore I will prescribe Cortisporin.  Other things to consider could be the start of herpes zoster or TMJ.  I did give her TMJ precautions.  If things do not improve she will contact me and we can consider CT scan.  She had mentioned some sinus and teeth related pain as well.  Unfortunately she cannot see a dentist at this time.    She also has tinnitus but no significant hearing loss.  We discussed hearing protection in noise.  She can try masking.  If things worsen her hearing worsen she should return for audiogram.    Dayron Connors MD  Otolaryngology  [facility]

## 2020-05-08 ENCOUNTER — VIRTUAL VISIT (OUTPATIENT)
Dept: FAMILY MEDICINE | Facility: CLINIC | Age: 55
End: 2020-05-08
Payer: COMMERCIAL

## 2020-05-08 VITALS — DIASTOLIC BLOOD PRESSURE: 85 MMHG | SYSTOLIC BLOOD PRESSURE: 122 MMHG | HEART RATE: 77 BPM

## 2020-05-08 DIAGNOSIS — H92.02 OTALGIA, LEFT: ICD-10-CM

## 2020-05-08 DIAGNOSIS — R03.0 ELEVATED BP WITHOUT DIAGNOSIS OF HYPERTENSION: Primary | ICD-10-CM

## 2020-05-08 PROCEDURE — 99213 OFFICE O/P EST LOW 20 MIN: CPT | Mod: 95 | Performed by: FAMILY MEDICINE

## 2020-05-08 NOTE — PROGRESS NOTES
"Shaista Villar is a 54 year old female who is being evaluated via a billable video visit.      The patient has been notified of following:     \"This video visit will be conducted via a call between you and your physician/provider. We have found that certain health care needs can be provided without the need for an in-person physical exam.  This service lets us provide the care you need with a video conversation.  If a prescription is necessary we can send it directly to your pharmacy.  If lab work is needed we can place an order for that and you can then stop by our lab to have the test done at a later time.    Video visits are billed at different rates depending on your insurance coverage.  Please reach out to your insurance provider with any questions.    If during the course of the call the physician/provider feels a video visit is not appropriate, you will not be charged for this service.\"    Patient has given verbal consent for Video visit? Yes    How would you like to obtain your AVS? Jeremias    Patient would like the video invitation sent by: Send to e-mail at: [email or web address]    Will anyone else be joining your video visit? No      Subjective     Shaista Villar is a 54 year old female who presents to clinic today for the following health issues:    HPI     Patient is following up with her left ear pain. She states its about the same.    Hypertension Follow-up      Do you check your blood pressure regularly outside of the clinic? Yes     Are you following a low salt diet? No    Are your blood pressures ever more than 140 on the top number (systolic) OR more   than 90 on the bottom number (diastolic), for example 140/90? No     Video Start Time: 1000am    Left ear and face pain   ?shingles with no rash vs TN   She has seen DDS and ENT         Reviewed and updated as needed this visit by Provider  Tobacco  Allergies  Meds  Problems  Med Hx  Surg Hx  Fam Hx         Review of Systems " "  Constitutional, HEENT, cardiovascular, pulmonary, gi and gu systems are negative, except as otherwise noted.      Objective    /85   Pulse 77   LMP 03/25/2016   Estimated body mass index is 30.79 kg/m  as calculated from the following:    Height as of 2/24/20: 1.626 m (5' 4\").    Weight as of 5/1/20: 81.4 kg (179 lb 6.4 oz).  Physical Exam     GENERAL: Healthy, alert and no distress  EYES: Eyes grossly normal to inspection, conjunctivae and sclerae normal  RESP: no audible wheeze, cough, or visible cyanosis.  No visible retractions or increased work of breathing.  Able to speak fully in complete sentences.  NEURO: Cranial nerves grossly intact, mentation intact and speech normal  PSYCH: mentation appears normal, affect normal/bright, judgement and insight intact, normal speech and appearance well-groomed      Diagnostic Test Results:  Labs reviewed in Epic        Assessment & Plan     1. Elevated BP without diagnosis of hypertension  Continue to monitor at home and if >140/90 she will let me know     2. Otalgia, left  ?trigeminal neuralgia vs shingles  Discussed use of gabapentin and she would like to hold for now will consider        BMI:   Estimated body mass index is 30.79 kg/m  as calculated from the following:    Height as of 2/24/20: 1.626 m (5' 4\").    Weight as of 5/1/20: 81.4 kg (179 lb 6.4 oz).   Weight management plan: Discussed healthy diet and exercise guidelines        There are no Patient Instructions on file for this visit.    Return in about 2 weeks (around 5/22/2020), or if symptoms worsen or fail to improve.    Deanna Cagle MD  [facility]      Video-Visit Details    Type of service:  Video Visit    Video End Time:1025am    Originating Location (pt. Location): Home    Distant Location (provider location):  [facility]     Platform used for Video Visit: iV[employer]    Return in about 2 weeks (around 5/22/2020), or if symptoms worsen or fail to " improve.       Deanna Cagle MD

## 2020-07-01 ENCOUNTER — MYC MEDICAL ADVICE (OUTPATIENT)
Dept: FAMILY MEDICINE | Facility: CLINIC | Age: 55
End: 2020-07-01

## 2020-07-06 ENCOUNTER — OFFICE VISIT (OUTPATIENT)
Dept: FAMILY MEDICINE | Facility: CLINIC | Age: 55
End: 2020-07-06
Payer: COMMERCIAL

## 2020-07-06 VITALS
SYSTOLIC BLOOD PRESSURE: 146 MMHG | BODY MASS INDEX: 30.9 KG/M2 | RESPIRATION RATE: 12 BRPM | DIASTOLIC BLOOD PRESSURE: 86 MMHG | TEMPERATURE: 98.3 F | HEART RATE: 76 BPM | WEIGHT: 180 LBS

## 2020-07-06 DIAGNOSIS — Z20.822 ENCOUNTER FOR LABORATORY TESTING FOR COVID-19 VIRUS: Primary | ICD-10-CM

## 2020-07-06 DIAGNOSIS — J30.2 SEASONAL ALLERGIC RHINITIS, UNSPECIFIED TRIGGER: ICD-10-CM

## 2020-07-06 DIAGNOSIS — K21.9 GASTROESOPHAGEAL REFLUX DISEASE, ESOPHAGITIS PRESENCE NOT SPECIFIED: ICD-10-CM

## 2020-07-06 PROCEDURE — 86769 SARS-COV-2 COVID-19 ANTIBODY: CPT | Mod: 90 | Performed by: FAMILY MEDICINE

## 2020-07-06 PROCEDURE — 36415 COLL VENOUS BLD VENIPUNCTURE: CPT | Performed by: FAMILY MEDICINE

## 2020-07-06 PROCEDURE — 99000 SPECIMEN HANDLING OFFICE-LAB: CPT | Performed by: FAMILY MEDICINE

## 2020-07-06 PROCEDURE — 99214 OFFICE O/P EST MOD 30 MIN: CPT | Performed by: FAMILY MEDICINE

## 2020-07-06 ASSESSMENT — PAIN SCALES - GENERAL: PAINLEVEL: MODERATE PAIN (5)

## 2020-07-06 NOTE — PATIENT INSTRUCTIONS
Take claritin D as you are     Start omeprazole as directed     Let me know in 1-2 weeks if better or not     Hold on allergy appt for now

## 2020-07-06 NOTE — LETTER
July 11, 2020        Shaista Villar  [address]  [facility] [address]      COVID-19 Antibody Screen   Date Value Ref Range Status   07/06/2020 Negative  Final     Comment:     No COVID-19 antibodies detected.  Patients within 10 days of symptom onset for   COVID-19 may not produce sufficient levels of detectable antibodies.    Immunocompromised COVID-19 patients may take longer to develop antibodies.       COVID-19 Antibody, IgG Titer   Date Value Ref Range Status   07/06/2020 Not Applicable  Final     Comment:     Qualitative screen for total antibodies to COVID-19 (SARS-CoV-2) with   semi-quantitative measurement of IgG COVID-19 antibodies by endpoint titer.    COVID-19 antibodies may be elevated due to a past or current infection.  Negative results do not rule out COVID-19 infection.  Results from antibody   testing should not be used as the sole basis to diagnose or exclude SARS-CoV-2   infection or to inform infection status.  COVID-19 PCR test should be ordered   if current infection is suspected.  False positive results may occur in rare   cases due to cross-reacting antibodies.  This test was developed and its performance characteristics determined by the   [facility] ([facility]),   which is regulated under CLIA as qualified to perform high-complexity testing.    This test has not been reviewed by the FDA.  Testing performed by [facility], [facility], [address], [address], [place] [address]         No results found for: COVAB      You have tested NEGATIVE for COVID-19 antibodies. This suggests you have not had or been exposed to COVID-19. But it does not mean that for sure.     The test finds antibodies in most people 10 days after they get sick. For some people, it takes longer than 10 days for antibodies to show up. Others may never show antibodies against COVID-19, especially if  they have weak immune systems.    If you have COVID-19 symptoms now, please stay home and away from others.     What is antibody testing?    This is a kind of blood test. We take a small sample of your blood, and then test it for something called  antibodies.      Your body makes antibodies to fight infection. If your blood has antibodies for a certain germ, it means you ve been infected with that germ in the past.     Sometimes, antibodies stay in your body for years after you ve had the infection. They can be there even if the germ didn t make you sick. They are a sign that your body fought off the infection.    Will this test find antibodies in everyone who s had COVID-19?    No. The test finds antibodies in most people 10 days after they get sick. For some people, it takes longer than 10 days for antibodies to show up. Others may never show antibodies against COVID-19, especially if they have weak immune systems.    What does it mean if the test finds COVID-19 antibodies?    If we find these antibodies, it suggests:     This person has had the virus.     Their body s immune system fought the virus.     We don t know if this will help protect someone from getting COVID-19 again. Scientists are still learning about this.    What are the signs of COVID-19?    Signs of COVID-19 can appear from 2 to 14 days (up to 2 weeks) after you re infected. Some people have no symptoms or only mild symptoms. Others get very sick. The most common symptoms are:          Cough    Shortness of breath or trouble breathing  Or at least 2 of these symptoms:    Fever    Chills    Repeated shaking with chills    Muscle pain    Headache    Sore throat    Losing your sense of taste or smell    You may have other symptoms. Please contact your doctor or clinic for any symptoms that worry you.    Where can I get more information?     To learn the [place] s guidelines for staying home, please visit the [place] Department of Health website  at https://www.health.state.[place]/diseases/coronavirus/basics.html    To learn more about COVID-19 and how to care for yourself at home, please visit the CDC website at https://www.cdc.gov/coronavirus/2019-ncov/about/steps-when-sick.html    For more options for care at [facility], please visit our website at [email or web address]    [facility] ([facility]) COVID-19 Hotline:  [phone]

## 2020-07-06 NOTE — NURSING NOTE
"Chief Complaint   Patient presents with     Throat Problem     Note mychart encounter 7/1/20     BP (!) 146/86   Pulse 76   Temp 98.3  F (36.8  C) (Tympanic)   Resp 12   Wt 81.6 kg (180 lb)   LMP 03/25/2016   BMI 30.90 kg/m   Estimated body mass index is 30.9 kg/m  as calculated from the following:    Height as of 2/24/20: 1.626 m (5' 4\").    Weight as of this encounter: 81.6 kg (180 lb).  Patient presents to the clinic using No DME      Health Maintenance that is potentially due pending provider review:    Health Maintenance Due   Topic Date Due     HIV SCREENING  12/04/1978     PNEUMOCOCCAL IMMUNIZATION 19-64 MEDIUM RISK (1 of 1 - PPSV23) 12/04/1982     PREVENTIVE CARE VISIT  07/22/2020     ASTHMA ACTION PLAN  07/22/2020        Possibly completing today per provider review.        "

## 2020-07-06 NOTE — PROGRESS NOTES
Subjective     Shaista Villar is a 54 year old female who presents to clinic today for the following health issues:    HPI   THROAT SYMPTOMS      Duration: 3 weeks    Description  sore throat and irritation    Severity: severe    Accompanying signs and symptoms: sx's through ear and throat - back side of tongue has sores - yellow/white in color, painful, back of throat modeled red and inflammed     History (predisposing factors):  Was treated for sinus infection in April    Precipitating or alleviating factors: None    Therapies tried and outcome:  claritin D helps clear things, gargling with salt water    She has had also more heartburn and feels like more burping   She has had an acid taste in her mouth as well   Mild cough or clearing sensation     She has allergies and thought it was this                  Reviewed and updated as needed this visit by Provider  Tobacco  Allergies  Meds  Problems  Med Hx  Surg Hx  Fam Hx         Review of Systems   Constitutional, HEENT, cardiovascular, pulmonary, gi and gu systems are negative, except as otherwise noted.      Objective    BP (!) 146/86   Pulse 76   Temp 98.3  F (36.8  C) (Tympanic)   Resp 12   Wt 81.6 kg (180 lb)   LMP 03/25/2016   BMI 30.90 kg/m    Body mass index is 30.9 kg/m .  Physical Exam   GENERAL APPEARANCE: healthy, alert and no distress  EYES: Eyes grossly normal to inspection, PERRL and conjunctivae and sclerae normal  HENT: ear canals and TM's normal and nose and mouth without ulcers or lesions  NECK: no adenopathy, no asymmetry, masses, or scars and thyroid normal to palpation  RESP: lungs clear to auscultation - no rales, rhonchi or wheezes  CV: regular rates and rhythm, normal S1 S2, no S3 or S4 and no murmur, click or rub  PSYCH: mentation appears normal and affect normal/bright    Diagnostic Test Results:  Labs reviewed in Epic        Assessment & Plan     1. Encounter for laboratory testing for COVID-19 virus  Thinks she may have had  this in Feb  - COVID-19 Virus (Coronavirus) Antibody & Titer Reflex; Future  - COVID-19 Virus (Coronavirus) Antibody & Titer Reflex    2. Seasonal allergic rhinitis, unspecified trigger    - ALLERGY/ASTHMA ADULT REFERRAL    3. Gastroesophageal reflux disease, esophagitis presence not specified    - omeprazole (PRILOSEC) 20 MG DR capsule; 1 tab bid for one week then daily to complete 6 weeks of treatment  Dispense: 60 capsule; Refill: 0       Patient Instructions   Take claritin D as you are     Start omeprazole as directed     Let me know in 1-2 weeks if better or not     Hold on allergy appt for now       Return in about 2 weeks (around 7/20/2020) for via [employer] message .      Risks, benefits, side effects and rationale for treatment plan fully discussed with the patient and understanding expressed.   Deanna Cagle MD  [facility]

## 2020-07-07 LAB
COVID-19 SPIKE RBD ABY TITER: NORMAL
COVID-19 SPIKE RBD ABY: NEGATIVE

## 2020-07-28 DIAGNOSIS — K21.9 GASTROESOPHAGEAL REFLUX DISEASE, ESOPHAGITIS PRESENCE NOT SPECIFIED: ICD-10-CM

## 2020-08-10 ENCOUNTER — OFFICE VISIT (OUTPATIENT)
Dept: FAMILY MEDICINE | Facility: CLINIC | Age: 55
End: 2020-08-10
Payer: COMMERCIAL

## 2020-08-10 VITALS
BODY MASS INDEX: 30.56 KG/M2 | TEMPERATURE: 97.9 F | WEIGHT: 179 LBS | HEIGHT: 64 IN | HEART RATE: 72 BPM | DIASTOLIC BLOOD PRESSURE: 88 MMHG | SYSTOLIC BLOOD PRESSURE: 138 MMHG | RESPIRATION RATE: 16 BRPM

## 2020-08-10 DIAGNOSIS — K21.9 GASTROESOPHAGEAL REFLUX DISEASE, ESOPHAGITIS PRESENCE NOT SPECIFIED: ICD-10-CM

## 2020-08-10 DIAGNOSIS — J45.20 MILD INTERMITTENT ASTHMA WITHOUT COMPLICATION: ICD-10-CM

## 2020-08-10 DIAGNOSIS — Z00.00 ROUTINE GENERAL MEDICAL EXAMINATION AT A HEALTH CARE FACILITY: Primary | ICD-10-CM

## 2020-08-10 PROCEDURE — 99213 OFFICE O/P EST LOW 20 MIN: CPT | Mod: 25 | Performed by: FAMILY MEDICINE

## 2020-08-10 PROCEDURE — 99396 PREV VISIT EST AGE 40-64: CPT | Performed by: FAMILY MEDICINE

## 2020-08-10 RX ORDER — ALBUTEROL SULFATE 90 UG/1
2 AEROSOL, METERED RESPIRATORY (INHALATION) EVERY 4 HOURS PRN
Qty: 3 INHALER | Refills: 3 | Status: SHIPPED | OUTPATIENT
Start: 2020-08-10 | End: 2021-07-27

## 2020-08-10 ASSESSMENT — ENCOUNTER SYMPTOMS
ABDOMINAL PAIN: 0
COUGH: 0
HEMATURIA: 0
DIARRHEA: 0
HEMATOCHEZIA: 0
CONSTIPATION: 0
DIZZINESS: 0
CHILLS: 0

## 2020-08-10 ASSESSMENT — MIFFLIN-ST. JEOR: SCORE: 1386.94

## 2020-08-10 NOTE — PATIENT INSTRUCTIONS

## 2020-08-10 NOTE — LETTER
My Asthma Action Plan    Name: Shaista Villar   YOB: 1965  Date: 8/10/2020   My doctor: Deanna Cagle MD   My clinic: [facility]        My Rescue Medicine:   Albuterol inhaler (Proair/Ventolin/Proventil HFA)  2-4 puffs EVERY 4 HOURS as needed. Use a spacer if recommended by your provider.   My Asthma Severity:   Intermittent / Exercise Induced  Know your asthma triggers: .  smoke  pollens          GREEN ZONE   Good Control    I feel good    No cough or wheeze    Can work, sleep and play without asthma symptoms       Take your asthma control medicine every day.     1. If exercise triggers your asthma, take your rescue medication    15 minutes before exercise or sports, and    During exercise if you have asthma symptoms  2. Spacer to use with inhaler: If you have a spacer, make sure to use it with your inhaler             YELLOW ZONE Getting Worse  I have ANY of these:    I do not feel good    Cough or wheeze    Chest feels tight    Wake up at night   1. Keep taking your Green Zone medications  2. Start taking your rescue medicine:    every 20 minutes for up to 1 hour. Then every 4 hours for 24-48 hours.  3. If you stay in the Yellow Zone for more than 12-24 hours, contact your doctor.  4. If you do not return to the Green Zone in 12-24 hours or you get worse, start taking your oral steroid medicine if prescribed by your provider.           RED ZONE Medical Alert - Get Help  I have ANY of these:    I feel awful    Medicine is not helping    Breathing getting harder    Trouble walking or talking    Nose opens wide to breathe       1. Take your rescue medicine NOW  2. If your provider has prescribed an oral steroid medicine, start taking it NOW  3. Call your doctor NOW  4. If you are still in the Red Zone after 20 minutes and you have not reached your doctor:    Take your rescue medicine again and    Call 911 or go to the emergency room right away    See your regular doctor  within 2 weeks of an Emergency Room or Urgent Care visit for follow-up treatment.          Annual Reminders:  Meet with Asthma Educator,  Flu Shot in the Fall, consider Pneumonia Vaccination for patients with asthma (aged 19 and older).    Pharmacy:    [facility] [id number] - [address] - [place], [place]  [place] - MAIL ORDER MAINT MEDS - NON-EPRESCRIBE  [facility] - [place], [place] - [address] AT PORTAL TO REGISTERED [facility] SITES    Electronically signed by Deanna Cagle MD   Date: 08/10/20                    Asthma Triggers  How To Control Things That Make Your Asthma Worse    Triggers are things that make your asthma worse.  Look at the list below to help you find your triggers and   what you can do about them. You can help prevent asthma flare-ups by staying away from your triggers.      Trigger                                                          What you can do   Cigarette Smoke  Tobacco smoke can make asthma worse. Do not allow smoking in your home, car or around you.  Be sure no one smokes at a child s day care or school.  If you smoke, ask your health care provider for ways to help you quit.  Ask family members to quit too.  Ask your health care provider for a referral to Quit Plan to help you quit smoking, or call 6-049-229-PLAN.     Colds, Flu, Bronchitis  These are common triggers of asthma. Wash your hands often.  Don t touch your eyes, nose or mouth.  Get a flu shot every year.     Dust Mites  These are tiny bugs that live in cloth or carpet. They are too small to see. Wash sheets and blankets in hot water every week.   Encase pillows and mattress in dust mite proof covers.  Avoid having carpet if you can. If you have carpet, vacuum weekly.   Use a dust mask and HEPA vacuum.   Pollen and Outdoor Mold  Some people are allergic to trees, grass, or weed pollen, or molds. Try to keep your windows closed.  Limit time out  doors when pollen count is high.   Ask you health care provider about taking medicine during allergy season.     Animal Dander  Some people are allergic to skin flakes, urine or saliva from pets with fur or feathers. Keep pets with fur or feathers out of your home.    If you can t keep the pet outdoors, then keep the pet out of your bedroom.  Keep the bedroom door closed.  Keep pets off cloth furniture and away from stuffed toys.     Mice, Rats, and Cockroaches  Some people are allergic to the waste from these pests.   Cover food and garbage.  Clean up spills and food crumbs.  Store grease in the refrigerator.   Keep food out of the bedroom.   Indoor Mold  This can be a trigger if your home has high moisture. Fix leaking faucets, pipes, or other sources of water.   Clean moldy surfaces.  Dehumidify basement if it is damp and smelly.   Smoke, Strong Odors, and Sprays  These can reduce air quality. Stay away from strong odors and sprays, such as perfume, powder, hair spray, paints, smoke incense, paint, cleaning products, candles and new carpet.   Exercise or Sports  Some people with asthma have this trigger. Be active!  Ask your doctor about taking medicine before sports or exercise to prevent symptoms.    Warm up for 5-10 minutes before and after sports or exercise.     Other Triggers of Asthma  Cold air:  Cover your nose and mouth with a scarf.  Sometimes laughing or crying can be a trigger.  Some medicines and food can trigger asthma.

## 2020-08-10 NOTE — PROGRESS NOTES
SUBJECTIVE:   CC: Shaista Villar is an 54 year old woman who presents for preventive health visit.     Healthy Habits:     Getting at least 3 servings of Calcium per day:  Yes    Bi-annual eye exam:  Yes    Dental care twice a year:  Yes    Sleep apnea or symptoms of sleep apnea:  None    Diet:  Regular (no restrictions)    Frequency of exercise:  2-3 days/week    Duration of exercise:  30-45 minutes    PHQ-2 Total Score: 0    Additional concerns today:  No    GERD/Heartburn      Duration: follow up     Description (location/character/radiation): GERD - heartburn and sore throat, clearing throat, lump in throat     Intensity:  mild    Accompanying signs and symptoms:  food getting stuck: YES - has resolved   nausea/vomiting/blood: no   abdominal pain: no   black/tarry or bloody stools: no :    History (similar episodes/previous evaluation): previous episode    Precipitating or alleviating factors:  worse with spicy foods, coffee   current NSAID/Aspirin use: no     Therapies tried and outcome: Omeprazole (Prilosec) - symptoms have improved     Asthma Follow-Up    Was ACT completed today?    Yes    ACT Total Scores 2/24/2020   ACT TOTAL SCORE -   ASTHMA ER VISITS -   ASTHMA HOSPITALIZATIONS -   ACT TOTAL SCORE (Goal Greater than or Equal to 20) 21   In the past 12 months, how many times did you visit the emergency room for your asthma without being admitted to the hospital? 0   In the past 12 months, how many times were you hospitalized overnight because of your asthma? 0         How many days per week do you miss taking your asthma controller medication?  7- has not used Advair since last fall     Please describe any recent triggers for your asthma: smoke and pollens    Have you had any Emergency Room Visits, Urgent Care Visits, or Hospital Admissions since your last office visit?  No      Today's PHQ-2 Score:   PHQ-2 ( 1999 Pfizer) 8/10/2020   Q1: Little interest or pleasure in doing things 0   Q2: Feeling down,  depressed or hopeless 0   PHQ-2 Score 0   Q1: Little interest or pleasure in doing things Not at all   Q2: Feeling down, depressed or hopeless Not at all   PHQ-2 Score 0       Abuse: Current or Past(Physical, Sexual or Emotional)- No  Do you feel safe in your environment? Yes        Social History     Tobacco Use     Smoking status: Never Smoker     Smokeless tobacco: Never Used   Substance Use Topics     Alcohol use: Yes     Comment: occas         Alcohol Use 8/10/2020   Prescreen: >3 drinks/day or >7 drinks/week? No   Prescreen: >3 drinks/day or >7 drinks/week? -       Reviewed orders with patient.  Reviewed health maintenance and updated orders accordingly - Yes  Labs reviewed in [facility]    Mammogram Screening: Patient over age 50, mutual decision to screen reflected in health maintenance.    Pertinent mammograms are reviewed under the imaging tab.  History of abnormal Pap smear: NO - age 30- 65 PAP every 3 years recommended  PAP / HPV Latest Ref Rng & Units 7/20/2018 6/12/2015 5/29/2012   PAP - NIL NIL NIL   HPV 16 DNA NEG:Negative Negative Negative -   HPV 18 DNA NEG:Negative Negative Negative -   OTHER HR HPV NEG:Negative Negative Negative -     Reviewed and updated as needed this visit by clinical staff         Reviewed and updated as needed this visit by Provider            Review of Systems   Constitutional: Negative for chills.   HENT: Negative for congestion.    Respiratory: Negative for cough.    Cardiovascular: Negative for chest pain.   Gastrointestinal: Negative for abdominal pain, constipation, diarrhea and hematochezia.   Genitourinary: Negative for hematuria.   Neurological: Negative for dizziness.     GERD is improved on the ppi she did have normal scope last year   sxs have resolved on the meds totally no cough no clearing of the throat she is better      OBJECTIVE:   LMP 03/25/2016   Physical Exam  GENERAL APPEARANCE: healthy, alert and no distress  EYES: Eyes grossly normal to inspection, PERRL  "and conjunctivae and sclerae normal  HENT: ear canals and TM's normal, nose and mouth without ulcers or lesions, oropharynx clear and oral mucous membranes moist  NECK: no adenopathy, no asymmetry, masses, or scars and thyroid normal to palpation  RESP: lungs clear to auscultation - no rales, rhonchi or wheezes  BREAST: normal without masses, tenderness or nipple discharge and no palpable axillary masses or adenopathy  CV: regular rate and rhythm, normal S1 S2, no S3 or S4, no murmur, click or rub, no peripheral edema and peripheral pulses strong  ABDOMEN: soft, nontender, no hepatosplenomegaly, no masses and bowel sounds normal  MS: no musculoskeletal defects are noted and gait is age appropriate without ataxia  SKIN: no suspicious lesions or rashes  NEURO: Normal strength and tone, sensory exam grossly normal, mentation intact and speech normal  PSYCH: mentation appears normal and affect normal/bright    Diagnostic Test Results:  Labs reviewed in Epic    ASSESSMENT/PLAN:   1. Routine general medical examination at a health care facility      2. Mild intermittent asthma without complication  Stable no change in treatment plan.   - albuterol (PROAIR HFA/PROVENTIL HFA/VENTOLIN HFA) 108 (90 Base) MCG/ACT inhaler; Inhale 2 puffs into the lungs every 4 hours as needed for shortness of breath / dyspnea  Dispense: 3 Inhaler; Refill: 3    3. Gastroesophageal reflux disease, esophagitis presence not specified  Resolved on PPI   Not wanting to be on this long term  Will stop in one week and see if sxs return if they do would consider pepcid for long term therapy and consider re scope       COUNSELING:  Reviewed preventive health counseling, as reflected in patient instructions    Estimated body mass index is 30.9 kg/m  as calculated from the following:    Height as of 2/24/20: 1.626 m (5' 4\").    Weight as of 7/6/20: 81.6 kg (180 lb).    Weight management plan: Discussed healthy diet and exercise guidelines     reports that " she has never smoked. She has never used smokeless tobacco.      Counseling Resources:  ATP IV Guidelines  Pooled Cohorts Equation Calculator  Breast Cancer Risk Calculator  FRAX Risk Assessment  ICSI Preventive Guidelines  Dietary Guidelines for Americans, 2010  USDA's MyPlate  ASA Prophylaxis  Lung CA Screening    Deanna Cagle MD  [facility]

## 2020-08-11 ASSESSMENT — ASTHMA QUESTIONNAIRES: ACT_TOTALSCORE: 24

## 2020-08-21 ENCOUNTER — MYC MEDICAL ADVICE (OUTPATIENT)
Dept: FAMILY MEDICINE | Facility: CLINIC | Age: 55
End: 2020-08-21
Payer: COMMERCIAL

## 2020-08-21 DIAGNOSIS — K21.9 GASTROESOPHAGEAL REFLUX DISEASE, ESOPHAGITIS PRESENCE NOT SPECIFIED: ICD-10-CM

## 2020-09-14 ENCOUNTER — IMMUNIZATION (OUTPATIENT)
Dept: FAMILY MEDICINE | Facility: CLINIC | Age: 55
End: 2020-09-14
Payer: COMMERCIAL

## 2020-09-14 PROCEDURE — 90472 IMMUNIZATION ADMIN EACH ADD: CPT

## 2020-09-14 PROCEDURE — 90682 RIV4 VACC RECOMBINANT DNA IM: CPT

## 2020-09-14 PROCEDURE — 90471 IMMUNIZATION ADMIN: CPT

## 2020-09-14 PROCEDURE — 90662 IIV NO PRSV INCREASED AG IM: CPT

## 2020-09-15 ENCOUNTER — HOSPITAL ENCOUNTER (OUTPATIENT)
Dept: MAMMOGRAPHY | Facility: CLINIC | Age: 55
Discharge: HOME | End: 2020-09-15
Admitting: FAMILY MEDICINE
Payer: COMMERCIAL

## 2020-09-15 DIAGNOSIS — Z12.31 VISIT FOR SCREENING MAMMOGRAM: ICD-10-CM

## 2020-09-15 PROCEDURE — 77063 BREAST TOMOSYNTHESIS BI: CPT

## 2021-03-21 ENCOUNTER — MYC MEDICAL ADVICE (OUTPATIENT)
Dept: FAMILY MEDICINE | Facility: CLINIC | Age: 56
End: 2021-03-21

## 2021-04-14 ENCOUNTER — MYC MEDICAL ADVICE (OUTPATIENT)
Dept: FAMILY MEDICINE | Facility: CLINIC | Age: 56
End: 2021-04-14

## 2021-07-25 ASSESSMENT — ENCOUNTER SYMPTOMS
SHORTNESS OF BREATH: 0
NERVOUS/ANXIOUS: 0
CONSTIPATION: 0
HEARTBURN: 0
HEMATURIA: 0
HEMATOCHEZIA: 0
JOINT SWELLING: 0
DIARRHEA: 0
ABDOMINAL PAIN: 0
PALPITATIONS: 0
FEVER: 0
WEAKNESS: 0
SORE THROAT: 0
CHILLS: 0
MYALGIAS: 0
EYE PAIN: 0
PARESTHESIAS: 0
COUGH: 0
DYSURIA: 0
NAUSEA: 0
ARTHRALGIAS: 0
HEADACHES: 0
BREAST MASS: 0
FREQUENCY: 0
DIZZINESS: 0

## 2021-07-27 ENCOUNTER — MYC MEDICAL ADVICE (OUTPATIENT)
Dept: FAMILY MEDICINE | Facility: CLINIC | Age: 56
End: 2021-07-27

## 2021-07-27 ENCOUNTER — OFFICE VISIT (OUTPATIENT)
Dept: FAMILY MEDICINE | Facility: CLINIC | Age: 56
End: 2021-07-27
Payer: COMMERCIAL

## 2021-07-27 VITALS
HEIGHT: 64 IN | SYSTOLIC BLOOD PRESSURE: 130 MMHG | HEART RATE: 76 BPM | RESPIRATION RATE: 12 BRPM | BODY MASS INDEX: 32.1 KG/M2 | WEIGHT: 188 LBS | DIASTOLIC BLOOD PRESSURE: 86 MMHG

## 2021-07-27 DIAGNOSIS — J45.20 MILD INTERMITTENT ASTHMA WITHOUT COMPLICATION: ICD-10-CM

## 2021-07-27 DIAGNOSIS — Z00.00 ROUTINE GENERAL MEDICAL EXAMINATION AT A HEALTH CARE FACILITY: Primary | ICD-10-CM

## 2021-07-27 DIAGNOSIS — Z12.31 ENCOUNTER FOR SCREENING MAMMOGRAM FOR MALIGNANT NEOPLASM OF BREAST: ICD-10-CM

## 2021-07-27 DIAGNOSIS — K21.9 GASTROESOPHAGEAL REFLUX DISEASE WITHOUT ESOPHAGITIS: ICD-10-CM

## 2021-07-27 LAB
ALBUMIN SERPL-MCNC: 3.9 G/DL (ref 3.4–5)
ALP SERPL-CCNC: 70 U/L (ref 40–150)
ALT SERPL W P-5'-P-CCNC: 23 U/L (ref 0–50)
ANION GAP SERPL CALCULATED.3IONS-SCNC: 7 MMOL/L (ref 3–14)
AST SERPL W P-5'-P-CCNC: 14 U/L (ref 0–45)
BILIRUB SERPL-MCNC: 0.8 MG/DL (ref 0.2–1.3)
BUN SERPL-MCNC: 12 MG/DL (ref 7–30)
CALCIUM SERPL-MCNC: 9.1 MG/DL (ref 8.5–10.1)
CHLORIDE BLD-SCNC: 102 MMOL/L (ref 94–109)
CHOLEST SERPL-MCNC: 192 MG/DL
CO2 SERPL-SCNC: 26 MMOL/L (ref 20–32)
CREAT SERPL-MCNC: 0.65 MG/DL (ref 0.52–1.04)
ERYTHROCYTE [DISTWIDTH] IN BLOOD BY AUTOMATED COUNT: 13.2 % (ref 10–15)
FASTING STATUS PATIENT QL REPORTED: YES
GFR SERPL CREATININE-BSD FRML MDRD: >90 ML/MIN/1.73M2
GLUCOSE BLD-MCNC: 86 MG/DL (ref 70–99)
HCT VFR BLD AUTO: 42.2 % (ref 35–47)
HDLC SERPL-MCNC: 104 MG/DL
HGB BLD-MCNC: 14.5 G/DL (ref 11.7–15.7)
LDLC SERPL CALC-MCNC: 79 MG/DL
MCH RBC QN AUTO: 32.4 PG (ref 26.5–33)
MCHC RBC AUTO-ENTMCNC: 34.4 G/DL (ref 31.5–36.5)
MCV RBC AUTO: 94 FL (ref 78–100)
NONHDLC SERPL-MCNC: 88 MG/DL
PLATELET # BLD AUTO: 310 10E3/UL (ref 150–450)
POTASSIUM BLD-SCNC: 4 MMOL/L (ref 3.4–5.3)
PROT SERPL-MCNC: 7.5 G/DL (ref 6.8–8.8)
RBC # BLD AUTO: 4.48 10E6/UL (ref 3.8–5.2)
SODIUM SERPL-SCNC: 135 MMOL/L (ref 133–144)
TRIGL SERPL-MCNC: 43 MG/DL
TSH SERPL DL<=0.005 MIU/L-ACNC: 2.44 MU/L (ref 0.4–4)
WBC # BLD AUTO: 4.8 10E3/UL (ref 4–11)

## 2021-07-27 PROCEDURE — 80053 COMPREHEN METABOLIC PANEL: CPT | Performed by: FAMILY MEDICINE

## 2021-07-27 PROCEDURE — 99396 PREV VISIT EST AGE 40-64: CPT | Mod: 25 | Performed by: FAMILY MEDICINE

## 2021-07-27 PROCEDURE — 90471 IMMUNIZATION ADMIN: CPT | Performed by: FAMILY MEDICINE

## 2021-07-27 PROCEDURE — 85027 COMPLETE CBC AUTOMATED: CPT | Performed by: FAMILY MEDICINE

## 2021-07-27 PROCEDURE — 90715 TDAP VACCINE 7 YRS/> IM: CPT | Performed by: FAMILY MEDICINE

## 2021-07-27 PROCEDURE — 80061 LIPID PANEL: CPT | Performed by: FAMILY MEDICINE

## 2021-07-27 PROCEDURE — 87624 HPV HI-RISK TYP POOLED RSLT: CPT | Performed by: FAMILY MEDICINE

## 2021-07-27 PROCEDURE — G0145 SCR C/V CYTO,THINLAYER,RESCR: HCPCS | Performed by: FAMILY MEDICINE

## 2021-07-27 PROCEDURE — 36415 COLL VENOUS BLD VENIPUNCTURE: CPT | Performed by: FAMILY MEDICINE

## 2021-07-27 PROCEDURE — 84443 ASSAY THYROID STIM HORMONE: CPT | Performed by: FAMILY MEDICINE

## 2021-07-27 RX ORDER — ALBUTEROL SULFATE 90 UG/1
2 AEROSOL, METERED RESPIRATORY (INHALATION) EVERY 4 HOURS PRN
Qty: 18 G | Refills: 11 | Status: SHIPPED | OUTPATIENT
Start: 2021-07-27 | End: 2021-12-01

## 2021-07-27 ASSESSMENT — ENCOUNTER SYMPTOMS
PARESTHESIAS: 0
SORE THROAT: 0
MYALGIAS: 0
JOINT SWELLING: 0
SHORTNESS OF BREATH: 0
CHILLS: 0
BREAST MASS: 0
CONSTIPATION: 0
COUGH: 0
ABDOMINAL PAIN: 0
HEARTBURN: 0
PALPITATIONS: 0
HEMATOCHEZIA: 0
FEVER: 0
NAUSEA: 0
EYE PAIN: 0
WEAKNESS: 0
NERVOUS/ANXIOUS: 0
DYSURIA: 0
HEMATURIA: 0
DIARRHEA: 0
ARTHRALGIAS: 0
FREQUENCY: 0
HEADACHES: 0
DIZZINESS: 0

## 2021-07-27 ASSESSMENT — MIFFLIN-ST. JEOR: SCORE: 1422.76

## 2021-07-27 ASSESSMENT — PAIN SCALES - GENERAL: PAINLEVEL: NO PAIN (0)

## 2021-07-27 NOTE — PROGRESS NOTES
SUBJECTIVE:   CC: Shaista Villar is an 55 year old woman who presents for preventive health visit.       Patient has been advised of split billing requirements and indicates understanding: Yes  Healthy Habits:     Getting at least 3 servings of Calcium per day:  Yes    Bi-annual eye exam:  Yes    Dental care twice a year:  Yes    Sleep apnea or symptoms of sleep apnea:  None    Diet:  Regular (no restrictions)    Frequency of exercise:  4-5 days/week    Duration of exercise:  15-30 minutes    Taking medications regularly:  Yes    PHQ-2 Total Score: 0    Additional concerns today:  No      GERD   Stable on PPI   No issues     Today's PHQ-2 Score:   PHQ-2 ( 1999 Pfizer) 7/25/2021   Q1: Little interest or pleasure in doing things 0   Q2: Feeling down, depressed or hopeless 0   PHQ-2 Score 0   Q1: Little interest or pleasure in doing things Not at all   Q2: Feeling down, depressed or hopeless Not at all   PHQ-2 Score 0       Abuse: Current or Past (Physical, Sexual or Emotional) - No  Do you feel safe in your environment? Yes        Social History     Tobacco Use     Smoking status: Never Smoker     Smokeless tobacco: Never Used   Substance Use Topics     Alcohol use: Yes     Comment: occas     If you drink alcohol do you typically have >3 drinks per day or >7 drinks per week? No    Alcohol Use 7/27/2021   Prescreen: >3 drinks/day or >7 drinks/week? -   Prescreen: >3 drinks/day or >7 drinks/week? No       Reviewed orders with patient.  Reviewed health maintenance and updated orders accordingly - Yes      Breast Cancer Screening:    FHS-7:   Breast CA Risk Assessment (FHS-7) 7/25/2021   Did any of your first-degree relatives have breast or ovarian cancer? No   Did any of your relatives have bilateral breast cancer? No   Did any man in your family have breast cancer? No   Did any woman in your family have breast and ovarian cancer? No   Did any woman in your family have breast cancer before age 50 y? No   Do you have 2  "or more relatives with breast and/or ovarian cancer? No   Do you have 2 or more relatives with breast and/or bowel cancer? No       Mammogram Screening: Recommended mammography every 1-2 years with patient discussion and risk factor consideration  Pertinent mammograms are reviewed under the imaging tab.    History of abnormal Pap smear: NO - age 30-65 PAP every 5 years with negative HPV co-testing recommended  PAP / HPV Latest Ref Rng & Units 7/20/2018 6/12/2015 5/29/2012   PAP (Historical) - NIL NIL NIL   HPV16 NEG:Negative Negative Negative -   HPV18 NEG:Negative Negative Negative -   HRHPV NEG:Negative Negative Negative -     Reviewed and updated as needed this visit by clinical staff  Tobacco  Allergies  Meds  Problems  Med Hx  Surg Hx  Fam Hx          Reviewed and updated as needed this visit by Provider  Tobacco  Allergies  Meds  Problems  Med Hx  Surg Hx  Fam Hx             Review of Systems   Constitutional: Negative for chills and fever.   HENT: Negative for congestion, ear pain, hearing loss and sore throat.    Eyes: Negative for pain and visual disturbance.   Respiratory: Negative for cough and shortness of breath.    Cardiovascular: Negative for chest pain, palpitations and peripheral edema.   Gastrointestinal: Negative for abdominal pain, constipation, diarrhea, heartburn, hematochezia and nausea.   Breasts:  Negative for tenderness, breast mass and discharge.   Genitourinary: Negative for dysuria, frequency, genital sores, hematuria, pelvic pain, urgency, vaginal bleeding and vaginal discharge.   Musculoskeletal: Negative for arthralgias, joint swelling and myalgias.   Skin: Negative for rash.   Neurological: Negative for dizziness, weakness, headaches and paresthesias.   Psychiatric/Behavioral: Negative for mood changes. The patient is not nervous/anxious.           OBJECTIVE:   /86   Pulse 76   Resp 12   Ht 1.626 m (5' 4\")   Wt 85.3 kg (188 lb)   LMP 03/25/2016   BMI 32.27 " kg/m    Physical Exam  GENERAL APPEARANCE: healthy, alert and no distress  EYES: Eyes grossly normal to inspection, PERRL and conjunctivae and sclerae normal  HENT: ear canals and TM's normal, nose and mouth without ulcers or lesions, oropharynx clear and oral mucous membranes moist  NECK: no adenopathy, no asymmetry, masses, or scars and thyroid normal to palpation  RESP: lungs clear to auscultation - no rales, rhonchi or wheezes  BREAST: normal without masses, tenderness or nipple discharge and no palpable axillary masses or adenopathy  CV: regular rate and rhythm, normal S1 S2, no S3 or S4, no murmur, click or rub, no peripheral edema and peripheral pulses strong  ABDOMEN: soft, nontender, no hepatosplenomegaly, no masses and bowel sounds normal   (female): normal female external genitalia, normal urethral meatus, vaginal mucosal atrophy noted, normal cervix, adnexae, and uterus without masses or abnormal discharge  MS: no musculoskeletal defects are noted and gait is age appropriate without ataxia  SKIN: no suspicious lesions or rashes  NEURO: Normal strength and tone, sensory exam grossly normal, mentation intact and speech normal  PSYCH: mentation appears normal and affect normal/bright    Diagnostic Test Results:  Labs reviewed in Epic    ASSESSMENT/PLAN:   1. Routine general medical examination at a health care facility    - Gynecologic Cytology (PAP Smear)  - Lipid panel reflex to direct LDL Fasting; Future  - TSH with free T4 reflex; Future  - DX Hip/Pelvis/Spine; Future  - TSH with free T4 reflex  - Lipid panel reflex to direct LDL Fasting    2. Mild intermittent asthma without complication  Stable no change in treatment plan.   - albuterol (PROAIR HFA/PROVENTIL HFA/VENTOLIN HFA) 108 (90 Base) MCG/ACT inhaler; Inhale 2 puffs into the lungs every 4 hours as needed for shortness of breath / dyspnea  Dispense: 18 g; Refill: 11    3. Gastroesophageal reflux disease without esophagitis  Stable no change in  "treatment plan.   - omeprazole (PRILOSEC) 20 MG DR capsule; TAKE 1 CAPSULE BY MOUTH DAILY  Dispense: 90 capsule; Refill: 3  - CBC with platelets; Future  - Comprehensive metabolic panel; Future  - Comprehensive metabolic panel  - CBC with platelets    4. Encounter for screening mammogram for malignant neoplasm of breast    - MA Screen Bilateral w/Darryl; Future    Patient has been advised of split billing requirements and indicates understanding: Yes  COUNSELING:  Reviewed preventive health counseling, as reflected in patient instructions    Estimated body mass index is 32.27 kg/m  as calculated from the following:    Height as of this encounter: 1.626 m (5' 4\").    Weight as of this encounter: 85.3 kg (188 lb).    Weight management plan: Discussed healthy diet and exercise guidelines    She reports that she has never smoked. She has never used smokeless tobacco.      Counseling Resources:  ATP IV Guidelines  Pooled Cohorts Equation Calculator  Breast Cancer Risk Calculator  BRCA-Related Cancer Risk Assessment: FHS-7 Tool  FRAX Risk Assessment  ICSI Preventive Guidelines  Dietary Guidelines for Americans, 2010  USDA's MyPlate  ASA Prophylaxis  Lung CA Screening    Deanna Cagle MD  [facility]  "

## 2021-07-27 NOTE — NURSING NOTE
"Chief Complaint   Patient presents with     Physical     /86   Pulse 76   Resp 12   Ht 1.626 m (5' 4\")   Wt 85.3 kg (188 lb)   LMP 03/25/2016   BMI 32.27 kg/m   Estimated body mass index is 32.27 kg/m  as calculated from the following:    Height as of this encounter: 1.626 m (5' 4\").    Weight as of this encounter: 85.3 kg (188 lb).  Patient presents to the clinic using No DME      Health Maintenance that is potentially due pending provider review:    Health Maintenance Due   Topic Date Due     ANNUAL REVIEW OF HM ORDERS  Never done     Pneumococcal Vaccine: Pediatrics (0 to 5 Years) and At-Risk Patients (6 to 64 Years) (1 of 2 - PPSV23) Never done     HIV SCREENING  Never done     ASTHMA CONTROL TEST  02/10/2021     DTAP/TDAP/TD IMMUNIZATION (3 - Td or Tdap) 05/26/2021     HPV TEST  07/20/2021     PAP  07/20/2021     PREVENTIVE CARE VISIT  08/10/2021     ASTHMA ACTION PLAN  08/10/2021        Possibly completing today per provider review.        "

## 2021-07-28 ASSESSMENT — ASTHMA QUESTIONNAIRES: ACT_TOTALSCORE: 22

## 2021-07-30 LAB
BKR LAB AP GYN ADEQUACY: NORMAL
BKR LAB AP GYN INTERPRETATION: NORMAL
BKR LAB AP HPV REFLEX: NORMAL
BKR LAB AP PREVIOUS ABNORMAL: NORMAL
PATH REPORT.COMMENTS IMP SPEC: NORMAL
PATH REPORT.RELEVANT HX SPEC: NORMAL

## 2021-08-02 LAB
HUMAN PAPILLOMA VIRUS 16 DNA: NEGATIVE
HUMAN PAPILLOMA VIRUS 18 DNA: NEGATIVE
HUMAN PAPILLOMA VIRUS FINAL DIAGNOSIS: NORMAL
HUMAN PAPILLOMA VIRUS OTHER HR: NEGATIVE

## 2021-09-18 ENCOUNTER — HEALTH MAINTENANCE LETTER (OUTPATIENT)
Age: 56
End: 2021-09-18

## 2021-09-20 ENCOUNTER — HOSPITAL ENCOUNTER (OUTPATIENT)
Dept: MAMMOGRAPHY | Facility: CLINIC | Age: 56
End: 2021-09-20
Attending: FAMILY MEDICINE
Payer: COMMERCIAL

## 2021-09-20 ENCOUNTER — HOSPITAL ENCOUNTER (OUTPATIENT)
Dept: BONE DENSITY | Facility: CLINIC | Age: 56
End: 2021-09-20
Attending: FAMILY MEDICINE
Payer: COMMERCIAL

## 2021-09-20 DIAGNOSIS — Z12.31 ENCOUNTER FOR SCREENING MAMMOGRAM FOR MALIGNANT NEOPLASM OF BREAST: ICD-10-CM

## 2021-09-20 DIAGNOSIS — Z00.00 ROUTINE GENERAL MEDICAL EXAMINATION AT A HEALTH CARE FACILITY: ICD-10-CM

## 2021-09-20 PROCEDURE — 77080 DXA BONE DENSITY AXIAL: CPT

## 2021-09-20 PROCEDURE — 77063 BREAST TOMOSYNTHESIS BI: CPT

## 2021-09-29 ENCOUNTER — OFFICE VISIT (OUTPATIENT)
Dept: FAMILY MEDICINE | Facility: CLINIC | Age: 56
End: 2021-09-29
Payer: COMMERCIAL

## 2021-09-29 ENCOUNTER — IMMUNIZATION (OUTPATIENT)
Dept: FAMILY MEDICINE | Facility: CLINIC | Age: 56
End: 2021-09-29
Payer: COMMERCIAL

## 2021-09-29 VITALS
WEIGHT: 189 LBS | SYSTOLIC BLOOD PRESSURE: 136 MMHG | DIASTOLIC BLOOD PRESSURE: 82 MMHG | BODY MASS INDEX: 32.44 KG/M2 | RESPIRATION RATE: 12 BRPM | OXYGEN SATURATION: 99 % | HEART RATE: 67 BPM

## 2021-09-29 DIAGNOSIS — R22.41 LUMP OF RIGHT THIGH: Primary | ICD-10-CM

## 2021-09-29 DIAGNOSIS — Z23 NEED FOR PROPHYLACTIC VACCINATION AND INOCULATION AGAINST INFLUENZA: Primary | ICD-10-CM

## 2021-09-29 PROCEDURE — 99213 OFFICE O/P EST LOW 20 MIN: CPT | Performed by: FAMILY MEDICINE

## 2021-09-29 PROCEDURE — 90471 IMMUNIZATION ADMIN: CPT

## 2021-09-29 PROCEDURE — 99207 PR NO CHARGE NURSE ONLY: CPT

## 2021-09-29 PROCEDURE — 90682 RIV4 VACC RECOMBINANT DNA IM: CPT

## 2021-09-29 ASSESSMENT — PAIN SCALES - GENERAL: PAINLEVEL: NO PAIN (1)

## 2021-09-29 NOTE — NURSING NOTE
"Chief Complaint   Patient presents with     Mass     BP (!) 146/86   Pulse 67   Resp 12   Wt 85.7 kg (189 lb)   LMP 03/25/2016   SpO2 99%   BMI 32.44 kg/m   Estimated body mass index is 32.44 kg/m  as calculated from the following:    Height as of 7/27/21: 1.626 m (5' 4\").    Weight as of this encounter: 85.7 kg (189 lb).  Patient presents to the clinic using No [employer] DME      Health Maintenance that is potentially due pending provider review:    Health Maintenance Due   Topic Date Due     ANNUAL REVIEW OF HM ORDERS  Never done     Pneumococcal Vaccine: Pediatrics (0 to 5 Years) and At-Risk Patients (6 to 64 Years) (1 of 2 - PPSV23) Never done     HIV SCREENING  Never done     ASTHMA ACTION PLAN  08/10/2021        n/a        "

## 2021-09-29 NOTE — PROGRESS NOTES
Assessment & Plan     Lump of right thigh  Suspect lipoma rule out other  - US Extremity Non Vascular Right; Future             Patient Instructions   Set up  [phone]      Return in about 2 weeks (around 10/13/2021), or if symptoms worsen or fail to improve.    Deanna Cagle MD  [facility]    Oksana Noonan is a 55 year old who presents for the following health issues     HPI     Concern - Lump inner right thigh  Onset: 3 weeks ago  Description: solid peanut size lump,   Intensity: moderate  Progression of Symptoms:  same  Accompanying Signs & Symptoms: feels irritated when touching  Therapies tried and outcome: ice        Review of Systems   Constitutional, HEENT, cardiovascular, pulmonary, gi and gu systems are negative, except as otherwise noted.      Objective    /82   Pulse 67   Resp 12   Wt 85.7 kg (189 lb)   LMP 03/25/2016   SpO2 99%   BMI 32.44 kg/m    Body mass index is 32.44 kg/m .  Physical Exam   GENERAL APPEARANCE: healthy, alert and no distress  MS: right post thigh smooth firm non tender mobil nodule 2-3 cm   PSYCH: mentation appears normal and affect normal/bright

## 2021-10-04 ENCOUNTER — HOSPITAL ENCOUNTER (OUTPATIENT)
Dept: ULTRASOUND IMAGING | Facility: CLINIC | Age: 56
Discharge: HOME | End: 2021-10-04
Attending: FAMILY MEDICINE | Admitting: FAMILY MEDICINE
Payer: COMMERCIAL

## 2021-10-04 DIAGNOSIS — R22.41 LUMP OF RIGHT THIGH: ICD-10-CM

## 2021-10-04 PROCEDURE — 76882 US LMTD JT/FCL EVL NVASC XTR: CPT | Mod: RT

## 2021-10-05 ENCOUNTER — MYC MEDICAL ADVICE (OUTPATIENT)
Dept: FAMILY MEDICINE | Facility: CLINIC | Age: 56
End: 2021-10-05
Payer: COMMERCIAL

## 2021-10-05 DIAGNOSIS — R22.41 MASS OF RIGHT THIGH: Primary | ICD-10-CM

## 2021-10-09 ENCOUNTER — HOSPITAL ENCOUNTER (OUTPATIENT)
Dept: MRI IMAGING | Facility: CLINIC | Age: 56
Discharge: HOME | End: 2021-10-09
Attending: FAMILY MEDICINE | Admitting: FAMILY MEDICINE
Payer: COMMERCIAL

## 2021-10-09 DIAGNOSIS — R22.41 MASS OF RIGHT THIGH: ICD-10-CM

## 2021-10-09 PROCEDURE — 255N000002 HC RX 255 OP 636: Performed by: FAMILY MEDICINE

## 2021-10-09 PROCEDURE — 73720 MRI LWR EXTREMITY W/O&W/DYE: CPT | Mod: 26 | Performed by: RADIOLOGY

## 2021-10-09 PROCEDURE — A9585 GADOBUTROL INJECTION: HCPCS | Performed by: FAMILY MEDICINE

## 2021-10-09 PROCEDURE — 73720 MRI LWR EXTREMITY W/O&W/DYE: CPT | Mod: RT

## 2021-10-09 RX ORDER — GADOBUTROL 604.72 MG/ML
7 INJECTION INTRAVENOUS ONCE
Status: COMPLETED | OUTPATIENT
Start: 2021-10-09 | End: 2021-10-09

## 2021-10-09 RX ADMIN — GADOBUTROL 7 ML: 604.72 INJECTION INTRAVENOUS at 14:04

## 2021-11-06 ENCOUNTER — MYC MEDICAL ADVICE (OUTPATIENT)
Dept: FAMILY MEDICINE | Facility: CLINIC | Age: 56
End: 2021-11-06
Payer: COMMERCIAL

## 2021-12-01 ENCOUNTER — MYC MEDICAL ADVICE (OUTPATIENT)
Dept: FAMILY MEDICINE | Facility: CLINIC | Age: 56
End: 2021-12-01
Payer: COMMERCIAL

## 2021-12-01 DIAGNOSIS — J45.20 MILD INTERMITTENT ASTHMA WITHOUT COMPLICATION: ICD-10-CM

## 2021-12-01 DIAGNOSIS — K21.9 GASTROESOPHAGEAL REFLUX DISEASE WITHOUT ESOPHAGITIS: ICD-10-CM

## 2021-12-01 RX ORDER — ALBUTEROL SULFATE 90 UG/1
2 AEROSOL, METERED RESPIRATORY (INHALATION) EVERY 4 HOURS PRN
Qty: 18 G | Refills: 5 | Status: SHIPPED | OUTPATIENT
Start: 2021-12-01 | End: 2022-08-19

## 2022-02-11 ENCOUNTER — MYC MEDICAL ADVICE (OUTPATIENT)
Dept: FAMILY MEDICINE | Facility: CLINIC | Age: 57
End: 2022-02-11
Payer: COMMERCIAL

## 2022-02-17 PROBLEM — K21.9 GASTROESOPHAGEAL REFLUX DISEASE: Status: ACTIVE | Noted: 2020-08-10

## 2022-03-02 ENCOUNTER — HOSPITAL ENCOUNTER (EMERGENCY)
Facility: CLINIC | Age: 57
Discharge: HOME | End: 2022-03-02
Attending: FAMILY MEDICINE | Admitting: FAMILY MEDICINE
Payer: COMMERCIAL

## 2022-03-02 ENCOUNTER — MYC MEDICAL ADVICE (OUTPATIENT)
Dept: FAMILY MEDICINE | Facility: CLINIC | Age: 57
End: 2022-03-02
Payer: COMMERCIAL

## 2022-03-02 ENCOUNTER — APPOINTMENT (OUTPATIENT)
Dept: GENERAL RADIOLOGY | Facility: CLINIC | Age: 57
End: 2022-03-02
Attending: FAMILY MEDICINE
Payer: COMMERCIAL

## 2022-03-02 VITALS
DIASTOLIC BLOOD PRESSURE: 95 MMHG | BODY MASS INDEX: 31.58 KG/M2 | HEIGHT: 64 IN | OXYGEN SATURATION: 97 % | RESPIRATION RATE: 13 BRPM | TEMPERATURE: 98.4 F | SYSTOLIC BLOOD PRESSURE: 148 MMHG | HEART RATE: 73 BPM | WEIGHT: 185 LBS

## 2022-03-02 DIAGNOSIS — R07.9 CHEST PAIN, UNSPECIFIED TYPE: ICD-10-CM

## 2022-03-02 DIAGNOSIS — R06.00 DYSPNEA, UNSPECIFIED TYPE: ICD-10-CM

## 2022-03-02 LAB
ALBUMIN SERPL-MCNC: 4.2 G/DL (ref 3.4–5)
ALP SERPL-CCNC: 68 U/L (ref 40–150)
ALT SERPL W P-5'-P-CCNC: 27 U/L (ref 0–50)
ANION GAP SERPL CALCULATED.3IONS-SCNC: 8 MMOL/L (ref 3–14)
AST SERPL W P-5'-P-CCNC: 22 U/L (ref 0–45)
BASOPHILS # BLD AUTO: 0.1 10E3/UL (ref 0–0.2)
BASOPHILS NFR BLD AUTO: 1 %
BILIRUB SERPL-MCNC: 0.5 MG/DL (ref 0.2–1.3)
BUN SERPL-MCNC: 10 MG/DL (ref 7–30)
CALCIUM SERPL-MCNC: 9.4 MG/DL (ref 8.5–10.1)
CHLORIDE BLD-SCNC: 101 MMOL/L (ref 94–109)
CO2 SERPL-SCNC: 24 MMOL/L (ref 20–32)
CREAT SERPL-MCNC: 0.61 MG/DL (ref 0.52–1.04)
D DIMER PPP FEU-MCNC: <0.27 UG/ML FEU (ref 0–0.5)
EOSINOPHIL # BLD AUTO: 0 10E3/UL (ref 0–0.7)
EOSINOPHIL NFR BLD AUTO: 0 %
ERYTHROCYTE [DISTWIDTH] IN BLOOD BY AUTOMATED COUNT: 13.3 % (ref 10–15)
GFR SERPL CREATININE-BSD FRML MDRD: >90 ML/MIN/1.73M2
GLUCOSE BLD-MCNC: 92 MG/DL (ref 70–99)
HCT VFR BLD AUTO: 42.3 % (ref 35–47)
HGB BLD-MCNC: 14.3 G/DL (ref 11.7–15.7)
HOLD SPECIMEN: NORMAL
IMM GRANULOCYTES # BLD: 0 10E3/UL
IMM GRANULOCYTES NFR BLD: 0 %
LIPASE SERPL-CCNC: 126 U/L (ref 73–393)
LYMPHOCYTES # BLD AUTO: 2.2 10E3/UL (ref 0.8–5.3)
LYMPHOCYTES NFR BLD AUTO: 30 %
MCH RBC QN AUTO: 32.1 PG (ref 26.5–33)
MCHC RBC AUTO-ENTMCNC: 33.8 G/DL (ref 31.5–36.5)
MCV RBC AUTO: 95 FL (ref 78–100)
MONOCYTES # BLD AUTO: 0.5 10E3/UL (ref 0–1.3)
MONOCYTES NFR BLD AUTO: 7 %
NEUTROPHILS # BLD AUTO: 4.5 10E3/UL (ref 1.6–8.3)
NEUTROPHILS NFR BLD AUTO: 62 %
NRBC # BLD AUTO: 0 10E3/UL
NRBC BLD AUTO-RTO: 0 /100
NT-PROBNP SERPL-MCNC: 72 PG/ML (ref 0–900)
PLATELET # BLD AUTO: 346 10E3/UL (ref 150–450)
POTASSIUM BLD-SCNC: 3.8 MMOL/L (ref 3.4–5.3)
PROT SERPL-MCNC: 8.1 G/DL (ref 6.8–8.8)
RBC # BLD AUTO: 4.46 10E6/UL (ref 3.8–5.2)
SODIUM SERPL-SCNC: 133 MMOL/L (ref 133–144)
TROPONIN I SERPL HS-MCNC: 5 NG/L
WBC # BLD AUTO: 7.3 10E3/UL (ref 4–11)

## 2022-03-02 PROCEDURE — 96374 THER/PROPH/DIAG INJ IV PUSH: CPT | Performed by: FAMILY MEDICINE

## 2022-03-02 PROCEDURE — 250N000011 HC RX IP 250 OP 636: Performed by: FAMILY MEDICINE

## 2022-03-02 PROCEDURE — 71046 X-RAY EXAM CHEST 2 VIEWS: CPT

## 2022-03-02 PROCEDURE — 36415 COLL VENOUS BLD VENIPUNCTURE: CPT | Performed by: FAMILY MEDICINE

## 2022-03-02 PROCEDURE — 84484 ASSAY OF TROPONIN QUANT: CPT | Performed by: FAMILY MEDICINE

## 2022-03-02 PROCEDURE — 83880 ASSAY OF NATRIURETIC PEPTIDE: CPT | Performed by: FAMILY MEDICINE

## 2022-03-02 PROCEDURE — 80053 COMPREHEN METABOLIC PANEL: CPT | Performed by: FAMILY MEDICINE

## 2022-03-02 PROCEDURE — 93010 ELECTROCARDIOGRAM REPORT: CPT | Performed by: FAMILY MEDICINE

## 2022-03-02 PROCEDURE — 93005 ELECTROCARDIOGRAM TRACING: CPT | Performed by: FAMILY MEDICINE

## 2022-03-02 PROCEDURE — 85025 COMPLETE CBC W/AUTO DIFF WBC: CPT | Performed by: FAMILY MEDICINE

## 2022-03-02 PROCEDURE — 99285 EMERGENCY DEPT VISIT HI MDM: CPT | Mod: 25 | Performed by: FAMILY MEDICINE

## 2022-03-02 PROCEDURE — 82040 ASSAY OF SERUM ALBUMIN: CPT | Performed by: FAMILY MEDICINE

## 2022-03-02 PROCEDURE — 85379 FIBRIN DEGRADATION QUANT: CPT | Performed by: FAMILY MEDICINE

## 2022-03-02 PROCEDURE — 83690 ASSAY OF LIPASE: CPT | Performed by: FAMILY MEDICINE

## 2022-03-02 RX ORDER — KETOROLAC TROMETHAMINE 15 MG/ML
10 INJECTION, SOLUTION INTRAMUSCULAR; INTRAVENOUS ONCE
Status: COMPLETED | OUTPATIENT
Start: 2022-03-02 | End: 2022-03-02

## 2022-03-02 RX ADMIN — KETOROLAC TROMETHAMINE 10 MG: 15 INJECTION, SOLUTION INTRAMUSCULAR; INTRAVENOUS at 17:18

## 2022-03-02 NOTE — DISCHARGE INSTRUCTIONS
Your test results today are all reassuring.  It is possible the symptoms are due to irritation of your chest wall.  You may use acetaminophen 1000 mg 4 times per day if needed for pain.  You may may use ibuprofen 40 mg 4 times per day if needed for pain.  If the symptoms persist, follow-up with Dr. Moreno.  Consider pulmonary function testing.  Return to the emergency department if you have worsening symptoms or you develop new concerning symptoms.

## 2022-03-02 NOTE — ED PROVIDER NOTES
"  History     Chief Complaint   Patient presents with     Chest Pain     chest pain started on Monday night with sob.  primary provider requested pt to be seen in ED     HPI    Shaista Villar is a 56 year old female who comes in with chest pain and dyspnea.  Symptoms began 2 nights ago.  She noticed before bed that she felt short of breath and like she was tight in the chest and had trouble getting a deep breath.  It made her feel lightheaded and winded.  She tried using her albuterol inhaler and thinks it might have helped.  The symptoms were present all of the next day which was yesterday.  She had tightness under the sternum and then twinges of pain under the left breast.  It does not seem to be affected by activity.  It is present even at rest.  However when she does walk she feels \"winded.\"  Last evening she felt like her heart was racing.  She has been monitoring her pulse rate and says it was high in the 80s to 85 range.  Her blood pressure was normal at 119 systolic she does not know what the diastolic was.  She is never smoked.  She has not had a cough with this.  She has no sore throat, cold, flu symptoms.  She is Covid vaccinated and has had 3 doses of the vaccine.  She has not had leg edema or calf pain.  Symptoms are aggravated by laying flat.  Her only medications are omeprazole, albuterol and Advair.  She generally only uses the Advair in the summer when her asthma bothers her which is not currently.  This does not feel like her typical asthma.  She has not had a history of a DVT or PE in the past.  She has had no change in her bowel or bladder function.  She does not currently have any abdominal pain.    Allergies:  Allergies   Allergen Reactions     Codeine      Tongue swelled     Darvocet [Acetaminophen] Nausea and Vomiting     Erythromycin Nausea and Vomiting     Sulfa Drugs Nausea and Vomiting       Problem List:    Patient Active Problem List    Diagnosis Date Noted     Gastroesophageal reflux " disease, esophagitis presence not specified 08/10/2020     Priority: Medium     IMO Regulatory Load OCT 2020       Right-sided chest wall pain 11/25/2019     Priority: Medium     Chronic seasonal allergic rhinitis due to other allergen 07/24/2017     Priority: Medium     Menopause 02/28/2016     Priority: Medium     Polyp of colon, adenomatous 05/14/2014     Priority: Medium     CARDIOVASCULAR SCREENING; LDL GOAL LESS THAN 160 10/31/2010     Priority: Medium     Contraceptive management 05/03/2007     Priority: Medium     Problem list name updated by automated process. Provider to review       Mild intermittent asthma 12/27/2006     Priority: Medium        Past Medical History:    Past Medical History:   Diagnosis Date     ASCUS on Pap smear 4/2005     Mild intermittent asthma      SEASONAL ALLERGIES        Past Surgical History:    Past Surgical History:   Procedure Laterality Date     APPENDECTOMY       COLONOSCOPY  5/12/2014    Procedure: COMBINED COLONOSCOPY, SINGLE BIOPSY/POLYPECTOMY BY BIOPSY;  Surgeon: Ridge Duke MD;  Location: [facility]     ESOPHAGOSCOPY, GASTROSCOPY, DUODENOSCOPY (EGD), COMBINED N/A 10/28/2019    Procedure: ESOPHAGOGASTRODUODENOSCOPY (EGD);  Surgeon: John Vargas MD;  Location: [facility]     SURGICAL HISTORY OF -   [address] [employer]       Family History:    Family History   Problem Relation Age of Onset     Cardiovascular Maternal Grandfather      Allergies Brother      Respiratory Brother         asthma     Asthma Brother      Glaucoma Brother         glaucoma     Respiratory Father         COPD     Cancer - colorectal Father 76     Diabetes Maternal Grandmother      Hypertension Paternal Grandmother      Hypertension Brother        Social History:  Marital Status:   [2]  Social History     Tobacco Use     Smoking status: Never Smoker     Smokeless tobacco: Never Used   Substance Use Topics     Alcohol use: Yes     Comment: occas     Drug use: No        Medications:    albuterol  "(PROAIR HFA/PROVENTIL HFA/VENTOLIN HFA) 108 (90 Base) MCG/ACT inhaler  CALCIUM-MAGNESIUM 500-250 MG PO TABS  loratadine-pseudoePHEDrine (CLARITIN-D 24-HOUR)  MG 24 hr tablet  magnesium 250 MG tablet  MULTIVITAMIN OR  omeprazole (PRILOSEC) 20 MG DR capsule  VITAMIN C 100 MG OR TABS  Vitamin D, Cholecalciferol, 1000 UNITS TABS  ZINC 10 MG OR TABS          Review of Systems  All other systems are reviewed and are negative    Physical Exam   BP: (!) 226/108  Pulse: 91  Temp: 98.4  F (36.9  C)  Resp: 18  Height: 162.6 cm (5' 4\")  Weight: 83.9 kg (185 lb)  SpO2: 99 %      Physical Exam  Nursing note and vitals were reviewed.  Constitutional: Awake and alert, adequately nourished and developed appearing 56-year-old in no apparent discomfort, who does not appear acutely ill, and who answers questions appropriately and cooperates with examination.  HEENT: EOMI.   Neck: Freely mobile.  Cardiovascular: Cardiac examination reveals normal heart rate and regular rhythm without murmur.  Pulmonary/Chest: Breathing is unlabored.  Breath sounds are clear and equal bilaterally.  There no retractions, tachypnea, rales, wheezes, or rhonchi.  Chest wall tender to palpation in the left parasternal region over the costochondral junctions without underlying crepitus or subcutaneous emphysema and without overlying ecchymosis erythema or swelling.  Abdomen: Soft, nontender, no HSM or masses rebound or guarding.  Musculoskeletal: Extremities are warm and well-perfused and without edema  Neurological: Alert, oriented, thought content logical, coherent   Skin: Warm, dry, no rashes.  Psychiatric: Affect broad and appropriate.      ED Course                 Procedures              EKG Interpretation:      Interpreted by Hawk Crouch MD  Time reviewed: 14:26  Symptoms at time of EKG: chest pain   Rhythm: normal sinus   Rate: normal  Axis: normal  Ectopy: none  Conduction: normal  ST Segments/ T Waves: No ST-T wave changes  Q Waves: " none  Comparison to prior: No old EKG available    Clinical Impression: normal EKG          Critical Care time:  none               Results for orders placed or performed during the hospital encounter of 03/02/22 (from the past 24 hour(s))   CBC with platelets differential    Narrative    The following orders were created for panel order CBC with platelets differential.  Procedure                               Abnormality         Status                     ---------                               -----------         ------                     CBC with platelets and d...[[id number]]                      Final result                 Please view results for these tests on the individual orders.   D dimer quantitative   Result Value Ref Range    D-Dimer Quantitative <0.27 0.00 - 0.50 ug/mL FEU    Narrative    This D-dimer assay is intended for use in conjunction with a clinical pretest probability assessment model to exclude pulmonary embolism (PE) and deep venous thrombosis (DVT) in outpatients suspected of PE or DVT. The cut-off value is 0.50 ug/mL FEU.   Comprehensive metabolic panel   Result Value Ref Range    Sodium 133 133 - 144 mmol/L    Potassium 3.8 3.4 - 5.3 mmol/L    Chloride 101 94 - 109 mmol/L    Carbon Dioxide (CO2) 24 20 - 32 mmol/L    Anion Gap 8 3 - 14 mmol/L    Urea Nitrogen 10 7 - 30 mg/dL    Creatinine 0.61 0.52 - 1.04 mg/dL    Calcium 9.4 8.5 - 10.1 mg/dL    Glucose 92 70 - 99 mg/dL    Alkaline Phosphatase 68 40 - 150 U/L    AST 22 0 - 45 U/L    ALT 27 0 - 50 U/L    Protein Total 8.1 6.8 - 8.8 g/dL    Albumin 4.2 3.4 - 5.0 g/dL    Bilirubin Total 0.5 0.2 - 1.3 mg/dL    GFR Estimate >90 >60 mL/min/1.73m2   Lipase   Result Value Ref Range    Lipase 126 73 - 393 U/L   Troponin I   Result Value Ref Range    Troponin I High Sensitivity 5 <54 ng/L   Nt probnp inpatient (BNP)   Result Value Ref Range    N terminal Pro BNP Inpatient 72 0 - 900 pg/mL   CBC with platelets and differential   Result Value Ref  Range    WBC Count 7.3 4.0 - 11.0 10e3/uL    RBC Count 4.46 3.80 - 5.20 10e6/uL    Hemoglobin 14.3 11.7 - 15.7 g/dL    Hematocrit 42.3 35.0 - 47.0 %    MCV 95 78 - 100 fL    MCH 32.1 26.5 - 33.0 pg    MCHC 33.8 31.5 - 36.5 g/dL    RDW 13.3 10.0 - 15.0 %    Platelet Count 346 150 - 450 10e3/uL    % Neutrophils 62 %    % Lymphocytes 30 %    % Monocytes 7 %    % Eosinophils 0 %    % Basophils 1 %    % Immature Granulocytes 0 %    NRBCs per 100 WBC 0 <1 /100    Absolute Neutrophils 4.5 1.6 - 8.3 10e3/uL    Absolute Lymphocytes 2.2 0.8 - 5.3 10e3/uL    Absolute Monocytes 0.5 0.0 - 1.3 10e3/uL    Absolute Eosinophils 0.0 0.0 - 0.7 10e3/uL    Absolute Basophils 0.1 0.0 - 0.2 10e3/uL    Absolute Immature Granulocytes 0.0 <=0.4 10e3/uL    Absolute NRBCs 0.0 10e3/uL   XR Chest 2 Views    Narrative    EXAM: XR CHEST 2 VW  LOCATION: [facility]  DATE/TIME: 3/2/2022 5:28 PM    INDICATION: chest pain  COMPARISON: 4/24/2012.      Impression    IMPRESSION:   No acute abnormality or significant interval change as compared to the previous study.    No focal pulmonary infiltrate, pleural effusion, or pneumothorax. The cardiac size and mediastinal contours appear within normal limits.           Medications   ketorolac (TORADOL) injection 10 mg (10 mg Intravenous Given 3/2/22 1718)       Assessments & Plan (with Medical Decision Making)     56-year-old female presented with chest pain and dyspnea as described above.  Her physical examination is normal.  Her laboratory investigation is all reassuring with a normal D-dimer, troponin, BNP, EKG, chest x-ray, CBC, blood chemistries.  There is no evidence that this pain is coming from ACS, angina, PE, pneumothorax, anemia, electrolyte imbalance, renal failure, or liver source.  I suspect perhaps the recent snowshoeing activity could have caused some irritation of the costochondral joints and be giving her some of the symptoms.  I do not hear any wheezing on exam and  I even reexamined her and had her forced exhale and could not identify any prolongation of her expiratory phase.  However given the dyspnea that she reports, if the symptoms do not resolve I would consider pulmonary function testing to rule out bronchospasm.  If she develops worsening symptoms or new concerning symptoms, I would like her to return to the emergency department for further investigation.  She expressed understanding and her questions were all answered.    I have reviewed the nursing notes.    I have reviewed the findings, diagnosis, plan and need for follow up with the patient.       New Prescriptions    No medications on file       Final diagnoses:   Chest pain, unspecified type   Dyspnea, unspecified type       3/2/2022   [facility] EMERGENCY DEPT     Hawk Crouch MD  03/02/22 1854

## 2022-03-02 NOTE — ED NOTES
"Pt states that mid-sternal chest pain started Monday. Pt denies doing anything strenuous at the time it started. Pt had been snowshoeing all weekend. Pt states that she \"get ortiz occasional sharp twinge behind my left breast\". Pt states \"I thought it was my asthma, but its not. My inhaler helps at night, but not during the day with the pains\". Pt denies stress or anxiety. No recent long trips. No fevers. Pt c/o some nausea at times.   "

## 2022-03-02 NOTE — TELEPHONE ENCOUNTER
"S-(situation): I talked with Shaista.  She tells me that on 2/28/22 went to bed and had hard time catching breath.  Used inhaler and breathing better and fell asleep.  Last nigh had trouble breathing again and also felt a bit lightheaded during the day.  Feels like she is a bit \"off\".  Felt heart palpitations yesterday.    No fever.  While talking with her, I did not notice shortness of breath    B-(background): history of asthma.    A-(assessment): symptoms of hard time catching breath and using inhaler with some relief.  Heart palpitations and anxiety as well.      R-(recommendations): advised needs to be seen.  Will go a  to the ED.  Katarina Leonard RN      "

## 2022-04-22 ENCOUNTER — OFFICE VISIT (OUTPATIENT)
Dept: FAMILY MEDICINE | Facility: CLINIC | Age: 57
End: 2022-04-22
Payer: COMMERCIAL

## 2022-04-22 VITALS
HEIGHT: 64 IN | HEART RATE: 76 BPM | DIASTOLIC BLOOD PRESSURE: 82 MMHG | OXYGEN SATURATION: 98 % | SYSTOLIC BLOOD PRESSURE: 126 MMHG | WEIGHT: 195.6 LBS | BODY MASS INDEX: 33.39 KG/M2 | TEMPERATURE: 97 F | RESPIRATION RATE: 17 BRPM

## 2022-04-22 DIAGNOSIS — M54.6 CHRONIC MIDLINE THORACIC BACK PAIN: Primary | ICD-10-CM

## 2022-04-22 DIAGNOSIS — M21.611 BUNION, RIGHT: ICD-10-CM

## 2022-04-22 DIAGNOSIS — G89.29 CHRONIC MIDLINE THORACIC BACK PAIN: Primary | ICD-10-CM

## 2022-04-22 PROCEDURE — 99214 OFFICE O/P EST MOD 30 MIN: CPT | Performed by: FAMILY MEDICINE

## 2022-04-22 RX ORDER — MULTIVITAMIN WITH IRON
TABLET ORAL
COMMUNITY

## 2022-04-22 RX ORDER — CALCIUM CARBONATE 500(1250)
1 TABLET ORAL 2 TIMES DAILY
COMMUNITY

## 2022-04-22 ASSESSMENT — PAIN SCALES - GENERAL: PAINLEVEL: MODERATE PAIN (4)

## 2022-04-22 ASSESSMENT — ASTHMA QUESTIONNAIRES: ACT_TOTALSCORE: 24

## 2022-04-22 NOTE — PROGRESS NOTES
"  Assessment & Plan     Chronic midline thoracic back pain  ?disc issue in the thoracic region abnl xray   Will get MRI and based on this next steps will be discussed   - XR Thoracic Spine 3 Views; Future  - MR Thoracic Spine w/o Contrast; Future    Bunion, right    - Orthopedic  Referral; Future             BMI:   Estimated body mass index is 33.57 kg/m  as calculated from the following:    Height as of this encounter: 1.626 m (5' 4\").    Weight as of this encounter: 88.7 kg (195 lb 9.6 oz).   Weight management plan: Discussed healthy diet and exercise guidelines    Patient Instructions   Set up  [id number]          Return in about 1 week (around 4/29/2022), or if symptoms worsen or fail to improve.    Deanna Cagle MD  [facility]    Oksana Noonan is a 56 year old who presents for the following health issues;     Chief Complaint   Patient presents with     ER F/U     Back Pain Ongoing for over 3 years, pain has been intermittent. Was seen in the ER 3/2/2022.      Musculoskeletal Problem     Large bunions       History of Present Illness       Back Pain:  She presents for follow up of back pain. Patient's back pain is a recurring problem.  Location of back pain:  Right upper back and left upper back  Description of back pain: cramping, fullness, sharp and stabbing  Back pain spreads: right shoulder and left shoulder    Since patient first noticed back pain, pain is: always present, but gets better and worse  Does back pain interfere with her job:  No      Reason for visit:  Painful large bunions on feet,  continuing back pain now on both sides  Symptom onset:  More than a month  Symptoms include:  Pain  Symptom intensity:  Moderate  Symptom progression:  Worsening  Had these symptoms before:  Yes  Has tried/received treatment for these symptoms:  Yes  What makes it worse:  Anything involving use of arms outstretched, reaching  What makes it better:  Sitting " "against ice pack, laying down, stretching    She eats 4 or more servings of fruits and vegetables daily.She consumes 0 sweetened beverage(s) daily.She exercises with enough effort to increase her heart rate 20 to 29 minutes per day.  She exercises with enough effort to increase her heart rate 3 or less days per week.   She is taking medications regularly.       ED/UC Followup:    Facility:  [facility]   Date of visit: 3/2/2022  Reason for visit: Chest pain, unspecified type, Dyspnea, unspecified type    Current Status: depends on what she is doing, dishes, standing, etc     This is ongoing thoracic pain   3 years  No injury   Worst when leaning forward for computer or cooking/dishes etc    No neuro sxs   No arm pain   Does seem at times to radiate around the right chest wall     Musculoskeletal problem/pain      Duration: years getting worse     Description  Location: right foot bunion     Intensity:  moderate, severe    Accompanying signs and symptoms: none    History  Previous similar problem: no   Previous evaluation:  none    Precipitating or alleviating factors:  Trauma or overuse: no   Aggravating factors include: walking, exercise and overuse    Therapies tried and outcome: rest/inactivity, heat and ice        Review of Systems   Constitutional, HEENT, cardiovascular, pulmonary, gi and gu systems are negative, except as otherwise noted.      Objective    /82   Pulse 76   Temp 97  F (36.1  C) (Tympanic)   Resp 17   Ht 1.626 m (5' 4\")   Wt 88.7 kg (195 lb 9.6 oz)   LMP 03/25/2016   SpO2 98%   BMI 33.57 kg/m    Body mass index is 33.57 kg/m .  Physical Exam   GENERAL APPEARANCE: healthy, alert and no distress  ORTHO: Lumber/Thoracic Spine Exam: Tender:  left parathoracic muscles, right parathoracic muscles  Non-tender:    Range of Motion:  Full   Strength:  able to heel walk, able to toe walk  Special tests:  None     Hip Exam: Hip ROM full      Right foot   Bunion tender and large " present       PSYCH: mentation appears normal and affect normal/bright      Xray is reviewed independently by Deanna Cagle MD and degenerative changes present

## 2022-04-27 ENCOUNTER — OFFICE VISIT (OUTPATIENT)
Dept: PODIATRY | Facility: CLINIC | Age: 57
End: 2022-04-27

## 2022-04-27 ENCOUNTER — ANCILLARY PROCEDURE (OUTPATIENT)
Dept: GENERAL RADIOLOGY | Facility: CLINIC | Age: 57
End: 2022-04-27
Attending: PODIATRIST
Payer: COMMERCIAL

## 2022-04-27 ENCOUNTER — HOSPITAL ENCOUNTER (OUTPATIENT)
Dept: MRI IMAGING | Facility: CLINIC | Age: 57
Discharge: HOME | End: 2022-04-27
Attending: FAMILY MEDICINE | Admitting: FAMILY MEDICINE
Payer: COMMERCIAL

## 2022-04-27 VITALS
HEIGHT: 64 IN | BODY MASS INDEX: 33.29 KG/M2 | HEART RATE: 93 BPM | SYSTOLIC BLOOD PRESSURE: 153 MMHG | WEIGHT: 195 LBS | DIASTOLIC BLOOD PRESSURE: 83 MMHG

## 2022-04-27 DIAGNOSIS — G89.29 CHRONIC MIDLINE THORACIC BACK PAIN: ICD-10-CM

## 2022-04-27 DIAGNOSIS — M21.611 BUNION, RIGHT: ICD-10-CM

## 2022-04-27 DIAGNOSIS — M20.11 HALLUX VALGUS, BILATERAL: ICD-10-CM

## 2022-04-27 DIAGNOSIS — M20.12 HALLUX VALGUS, BILATERAL: ICD-10-CM

## 2022-04-27 DIAGNOSIS — M54.6 CHRONIC MIDLINE THORACIC BACK PAIN: ICD-10-CM

## 2022-04-27 DIAGNOSIS — M21.6X2 BILATERAL PRONATION DEFORMITY OF FEET: ICD-10-CM

## 2022-04-27 DIAGNOSIS — M21.612 BUNION, LEFT: Primary | ICD-10-CM

## 2022-04-27 DIAGNOSIS — M21.6X1 BILATERAL PRONATION DEFORMITY OF FEET: ICD-10-CM

## 2022-04-27 PROCEDURE — 73630 X-RAY EXAM OF FOOT: CPT | Mod: RT | Performed by: RADIOLOGY

## 2022-04-27 PROCEDURE — 99203 OFFICE O/P NEW LOW 30 MIN: CPT | Performed by: PODIATRIST

## 2022-04-27 PROCEDURE — 72146 MRI CHEST SPINE W/O DYE: CPT

## 2022-04-27 ASSESSMENT — PAIN SCALES - GENERAL: PAINLEVEL: EXTREME PAIN (8)

## 2022-04-27 NOTE — NURSING NOTE
"Chief Complaint   Patient presents with     Consult     BL kanika       Initial BP (!) 153/83   Pulse 93   Ht 1.626 m (5' 4\")   Wt 88.5 kg (195 lb)   LMP 03/25/2016   BMI 33.47 kg/m   Estimated body mass index is 33.47 kg/m  as calculated from the following:    Height as of this encounter: 1.626 m (5' 4\").    Weight as of this encounter: 88.5 kg (195 lb).  Medications and allergies reviewed.      Sunita KOHLI MA    "

## 2022-04-27 NOTE — PROGRESS NOTES
PATIENT HISTORY:  Shaista Villar is a 56 year old female who presents to clinic in consultation at the request of  Deanna Cagle M.D. with a chief complaint of bilateral feet pan.  The patient is seen by themselves.  The patient relates the pain is primarily located around the great toe.  Reports insidious onset without acute precipitating event. that has been going on for 10 year(s).  The patient has previously tried good shoes, wid width shoes, and orthotics with little relief.  Denies any prior history of similar issues..  The patient is currently employed as .  Any previous notes and studies that pertain to the patient's condition were reviewed.         Past Medical History:   Past Medical History:   Diagnosis Date     ASCUS on Pap smear 4/2005    (negative) HPV     Mild intermittent asthma      SEASONAL ALLERGIES        Medications:   Current Outpatient Medications:      albuterol (PROAIR HFA/PROVENTIL HFA/VENTOLIN HFA) 108 (90 Base) MCG/ACT inhaler, Inhale 2 puffs into the lungs every 4 hours as needed for shortness of breath / dyspnea, Disp: 18 g, Rfl: 5     calcium carbonate (OS-MARLENI) 500 MG tablet, Take 1 tablet by mouth 2 times daily, Disp: , Rfl:      magnesium 250 MG tablet, Take 1 tablet by mouth daily 85 mg, Disp: , Rfl:      MULTIVITAMIN OR, one daily, Disp: , Rfl:      omeprazole (PRILOSEC) 20 MG DR capsule, TAKE 1 CAPSULE BY MOUTH DAILY, Disp: 90 capsule, Rfl: 1     vitamin (B COMPLEX-C) tablet, , Disp: , Rfl:      VITAMIN C 100 MG OR TABS, prn, Disp: , Rfl:      Vitamin D, Cholecalciferol, 1000 UNITS TABS, Take 1 tablet by mouth daily, Disp: , Rfl:      ZINC 10 MG OR TABS, prn, Disp: , Rfl:      loratadine-pseudoePHEDrine (CLARITIN-D 24-HOUR)  MG 24 hr tablet, Take 1 tablet by mouth daily (Patient not taking: No sig reported), Disp: , Rfl:      Allergies:    Allergies   Allergen Reactions     Codeine      Tongue swelled     Darvocet [Acetaminophen] Nausea and Vomiting      "Erythromycin Nausea and Vomiting     Sulfa Drugs Nausea and Vomiting       Vitals: BP (!) 153/83   Pulse 93   Ht 1.626 m (5' 4\")   Wt 88.5 kg (195 lb)   LMP 03/25/2016   BMI 33.47 kg/m    BMI= Body mass index is 33.47 kg/m .    LOWER EXTREMITY PHYSICAL EXAM    Dermatologic: Skin is intact to right and left lower extremity without significant lesions, rash or abrasion.        Vascular: DP & PT pulses are intact & regular on the right and left.   CFT and skin temperature is normal to the right and left lower extremity.     Neurologic: Lower extremity sensation is intact to light touch.  No evidence of weakness in the right and left lower extremity.        Musculoskeletal: Patient is ambulatory without assistive device or brace.  No gross ankle deformity noted.  No foot or ankle joint effusion is noted.  Noted moderate bunion deformity bilaterally to both feet.  No pain with range of motion of the first metatarsophalangeal joint bilaterally.  Pain on palpation over the dorsal medial eminence bilaterally.    Diagnostics:  Radiographs included three views of the left and right foot demonstrating moderate bunion deformity bilaterally with an elevated first intermetatarsal angle and hallux abductus angle.  No degenerative changes noted.  No cortical erosions or periosteal elevation.  All joint margins appear stable.  There is no apparent fracture or tumor formation noted.  There is no evidence of foreign body.  The images were independently reviewed by myself along with the patient explaining the findings.      ASSESSMENT / PLAN:     ICD-10-CM    1. Bunion, left  M21.612    2. Bunion, right  M21.611 Orthopedic  Referral     XR Foot Bilateral G/E 3 Views   3. Hallux valgus, bilateral  M20.11 [facility] DME Orthotic; Foot Orthotics (functional rigid support); Bilateral    M20.12    4. Bilateral pronation deformity of feet  M21.6X1 [facility] DME Orthotic; Foot Orthotics (functional " rigid support); Bilateral    M21.6X2        I have explained to Shaista about the conditions.  We discussed the underlying contributing factors to the condition as well as treatment options along with expected length of recovery.  At this time, the patient was educated on the importance of offloading supportive shoes and other devices.  I demonstrated to the patient calf stretches to perform every hour daily until symptoms resolve.  After symptoms resolve, the patient was advised to perform the stretches 3 times daily to prevent future recurrence.  The patient was instructed to perform warm soaks with Epson salt after which to also apply over-the-counter Voltaren gel to deeply massage the injured tissue.  The patient was instructed to do this on a daily basis until symptoms resolve.   The patient was prescribed custom orthotics that will aid in offloading the tension forces to the soft tissues and prevent further inflammation.  The patient will return for reevaluation if problems persist to determine if any further treatment will be needed.      Shaista verbalized agreement with and understanding of the rational for the diagnosis and treatment plan.  All questions were answered to best of my ability and the patient's satisfaction. The patient was advised to contact the clinic with any questions that may arise after the clinic visit.      Disclaimer: This note consists of symbols derived from keyboarding, dictation and/or voice recognition software. As a result, there may be errors in the script that have gone undetected. Please consider this when interpreting information found in this chart.       KRISHNA Singh D.P.M., FBLANE.F.A.S.

## 2022-04-27 NOTE — PATIENT INSTRUCTIONS
Next Steps:      Support:  Wear supportive shoes, sandals, boots and/or inserts that have a rigid supportive sole.    This will offload the majority of tension forces that travel through your feet every step you take.    Sk[employer]ares Max Cushioning Elite/Premier   S[employer]chers Relax Fit D'Lux Walker  [employer]ebok Walk Ultra 7 DMX MAX   Gonzalez Bondi walking shoes  Superfeet inserts ([email or web address])  It is important that you also wear supportive shoe wear in the house to continue providing support to your feet.    You may always use a cushioned liner for your shoes if that makes your feet feel better.  Stretching  Calf stretching is essential to offload the tension forces that travel through your feet every step you take  Preferred calf stretch is the Runner's Stretch  Place one foot behind the other foot, flat against the ground (it is important to keep the heel on the ground).  The back leg is the one that will be stretched.  Start with the knee straight and lean your hips into the wall, counter or whatever you are leaning into - count to ten.  Next, bend the knee.  You should feel the stretch lower in the calf muscle - count to ten.  Repeat this stretch once an hour to start off with.  When symptoms subside, I recommend performing the stretch 3 times daily to prevent any future problems.                Tissue Massage  It is important that you physically loosen the inflammation tissue to help your body heal the injured tissue.  I recommend soaking your foot in warm water to increase the microcirculation to the soft tissues.  You may add Epson salt to the water if you prefer.  You may apply an over-the-counter muscle rub, such as Voltaren gel, and deeply massage the injured tissue.  Reduce Inflammation  You can ice the injured tissue with an ice pack with a light cloth covering or soaking in ice water 20 minutes to reduce any acute inflammation, typically at the end of the day.      It is important to understand that most  problems that develop in the foot and ankle are caused by excessive tension that cause microinjury to the soft tissues and inflammation in the foot and ankle.  By addressing the underlying causes with support and stretching as well as treating the current inflammatory conditions with tissue massage and anti-inflammatory treatments, most foot and ankle musculoskeletal conditions will resolve.  This may take time to heal.  However, if symptoms persist past 4 weeks you should return to the office for reevaluation to determine further treatment options.    What are Prescription Custom Orthotics?  Custom orthotics are specially-made devices designed to support and comfort your feet. Prescription orthotics are crafted for you and no one else. They match the contours of your feet precisely and are designed for the way you move. Orthotics are only manufactured after a podiatrist has conducted a complete evaluation of your feet, ankles, and legs, so the orthotic can accommodate your unique foot structure and pathology.  Prescription orthotics are divided into two categories:  Functional orthotics are designed to control abnormal motion. They may be used to treat foot pain caused by abnormal motion; they can also be used to treat injuries such as shin splints or tendinitis. Functional orthotics are usually crafted of a semi-rigid material such as plastic or graphite.  Accommodative orthotics are softer and meant to provide additional cushioning and support. They can be used to treat diabetic foot ulcers, painful calluses on the bottom of the foot, and other uncomfortable conditions.  Podiatrists use orthotics to treat foot problems such as plantar fasciitis, bursitis, tendinitis, diabetic foot ulcers, and foot, ankle, and heel pain. Clinical research studies have shown that podiatrist-prescribed foot orthotics decrease foot pain and improve function.  Orthotics typically cost more than shoe inserts purchased in a retail  store, but the additional cost is usually well worth it. Unlike shoe inserts, orthotics are molded to fit each individual foot, so you can be sure that your orthotics fit and do what they're supposed to do. Prescription orthotics are also made of top-notch materials and last many years when cared for properly. Insurance often helps pay for prescription orthotics.  What are Shoe Inserts?   You've seen them at the grocery store and at the mall. You've probably even seen them on TV and online. Shoe inserts are any kind of non-prescription foot support designed to be worn inside a shoe. Pre-packaged, mass produced, arch supports are shoe inserts. So are the  custom-made  insoles and foot supports that you can order online or at retail stores. Unless the device has been prescribed by a doctor and crafted for your specific foot, it's a shoe insert, not a custom orthotic device--despite what the ads might say.  Shoe inserts can be very helpful for a variety of foot ailments, including flat arches and foot and leg pain. They can cushion your feet, provide comfort, and support your arches, but they can't correct biomechanical foot problems or cure long-standing foot issues.  The most common types of shoe inserts are:  Arch supports: Some people have high arches. Others have low arches or flat feet. Arch supports generally have a  bumped-up  appearance and are designed to support the foot's natural arch.   Insoles: Insoles slip into your shoe to provide extra cushioning and support. Insoles are often made of gel, foam, or plastic.   Heel liners: Heel liners, sometimes called heel pads or heel cups, provide extra cushioning in the heel region. They may be especially useful for patients who have foot pain caused by age-related thinning of the heels' natural fat pads.   Foot cushions: Do your shoes rub against your heel or your toes? Foot cushions come in many different shapes and sizes and can be used as a barrier between you  and your shoe.  Choosing an Over-the-Counter Shoe Insert  Selecting a shoe insert from the wide variety of devices on the market can be overwhelming. Here are some podiatrist-tested tips to help you find the insert that best meets your needs:  Consider your health. Do you have diabetes? Problems with circulation? An over-the-counter insert may not be your best bet. Diabetes and poor circulation increase your risk of foot ulcers and infections, so schedule an appointment with a podiatrist. He or she can help you select a solution that won't cause additional health problems.   Think about the purpose. Are you planning to run a marathon, or do you just need a little arch support in your work shoes? Look for a product that fits your planned level of activity.   Bring your shoes. For the insert to be effective, it has to fit into your shoes. So bring your sneakers, dress shoes, or work boots--whatever you plan to wear with your insert. Look for an insert that will fit the contours of your shoe.   Try them on. If all possible, slip the insert into your shoe and try it out. Walk around a little. How does it feel? Don't assume that feelings of pressure will go away with continued wear. (If you can't try the inserts at the store, ask about the store's return policy and hold on to your receipt.)    Please call one of the [place] locations below to schedule an appointment. If you received a prescription please bring it with you to your appointment. Some locations are limited to what they carry.    Office Locations    [facility]  [address] [place] [address]  [address] Medical Equipment, [address]   Phone: [phone]   Orthotics and Prosthetics, [address]   Phone: [phone]    [facility]  [address]  [address]   [place] [address]   Phone: [phone]    [facility]   [address]  [place] [address]  Phone: [phone]    [facility]  [address]  [address]  [place] [address]  Phone: [phone]    [facility]  [address]  [address]  [place] [address]  Phone: [phone]    [facility]  [address] [place] [address]   Phone: [phone]    [facility]  [address] [place] [address]  Phone: [phone]    [facility]  [address]  [place] [address]  Phone: [phone]

## 2022-04-27 NOTE — LETTER
4/27/2022         RE: Shaista Villar  [address] [facility] [address]        Dear Colleague,    Thank you for referring your patient, Shaista Villar, to the [facility] [place]. Please see a copy of my visit note below.    PATIENT HISTORY:  Shaista Villar is a 56 year old female who presents to clinic in consultation at the request of  Deanna Cagle M.D. with a chief complaint of bilateral feet pan.  The patient is seen by themselves.  The patient relates the pain is primarily located around the great toe.  Reports insidious onset without acute precipitating event. that has been going on for 10 year(s).  The patient has previously tried good shoes, wid width shoes, and orthotics with little relief.  Denies any prior history of similar issues..  The patient is currently employed as .  Any previous notes and studies that pertain to the patient's condition were reviewed.         Past Medical History:   Past Medical History:   Diagnosis Date     ASCUS on Pap smear 4/2005    (negative) HPV     Mild intermittent asthma      SEASONAL ALLERGIES        Medications:   Current Outpatient Medications:      albuterol (PROAIR HFA/PROVENTIL HFA/VENTOLIN HFA) 108 (90 Base) MCG/ACT inhaler, Inhale 2 puffs into the lungs every 4 hours as needed for shortness of breath / dyspnea, Disp: 18 g, Rfl: 5     calcium carbonate (OS-MARLENI) 500 MG tablet, Take 1 tablet by mouth 2 times daily, Disp: , Rfl:      magnesium 250 MG tablet, Take 1 tablet by mouth daily 85 mg, Disp: , Rfl:      MULTIVITAMIN OR, one daily, Disp: , Rfl:      omeprazole (PRILOSEC) 20 MG DR capsule, TAKE 1 CAPSULE BY MOUTH DAILY, Disp: 90 capsule, Rfl: 1     vitamin (B COMPLEX-C) tablet, , Disp: , Rfl:      VITAMIN C 100 MG OR TABS, prn, Disp: , Rfl:      Vitamin D, Cholecalciferol, 1000 UNITS TABS, Take 1 tablet by mouth daily, Disp: , Rfl:      ZINC 10 MG OR TABS, prn, Disp: , Rfl:       "loratadine-pseudoePHEDrine (CLARITIN-D 24-HOUR)  MG 24 hr tablet, Take 1 tablet by mouth daily (Patient not taking: No sig reported), Disp: , Rfl:      Allergies:    Allergies   Allergen Reactions     Codeine      Tongue swelled     Darvocet [Acetaminophen] Nausea and Vomiting     Erythromycin Nausea and Vomiting     Sulfa Drugs Nausea and Vomiting       Vitals: BP (!) 153/83   Pulse 93   Ht 1.626 m (5' 4\")   Wt 88.5 kg (195 lb)   LMP 03/25/2016   BMI 33.47 kg/m    BMI= Body mass index is 33.47 kg/m .    LOWER EXTREMITY PHYSICAL EXAM    Dermatologic: Skin is intact to right and left lower extremity without significant lesions, rash or abrasion.        Vascular: DP & PT pulses are intact & regular on the right and left.   CFT and skin temperature is normal to the right and left lower extremity.     Neurologic: Lower extremity sensation is intact to light touch.  No evidence of weakness in the right and left lower extremity.        Musculoskeletal: Patient is ambulatory without assistive device or brace.  No gross ankle deformity noted.  No foot or ankle joint effusion is noted.  Noted moderate bunion deformity bilaterally to both feet.  No pain with range of motion of the first metatarsophalangeal joint bilaterally.  Pain on palpation over the dorsal medial eminence bilaterally.    Diagnostics:  Radiographs included three views of the left and right foot demonstrating moderate bunion deformity bilaterally with an elevated first intermetatarsal angle and hallux abductus angle.  No degenerative changes noted.  No cortical erosions or periosteal elevation.  All joint margins appear stable.  There is no apparent fracture or tumor formation noted.  There is no evidence of foreign body.  The images were independently reviewed by myself along with the patient explaining the findings.      ASSESSMENT / PLAN:     ICD-10-CM    1. Bunion, left  M21.612    2. Bunion, right  M21.611 Orthopedic  Referral     XR " Foot Bilateral G/E 3 Views   3. Hallux valgus, bilateral  M20.11 [facility] DME Orthotic; Foot Orthotics (functional rigid support); Bilateral    M20.12    4. Bilateral pronation deformity of feet  M21.6X1 [facility] DME Orthotic; Foot Orthotics (functional rigid support); Bilateral    M21.6X2        I have explained to Shaista about the conditions.  We discussed the underlying contributing factors to the condition as well as treatment options along with expected length of recovery.  At this time, the patient was educated on the importance of offloading supportive shoes and other devices.  I demonstrated to the patient calf stretches to perform every hour daily until symptoms resolve.  After symptoms resolve, the patient was advised to perform the stretches 3 times daily to prevent future recurrence.  The patient was instructed to perform warm soaks with Epson salt after which to also apply over-the-counter Voltaren gel to deeply massage the injured tissue.  The patient was instructed to do this on a daily basis until symptoms resolve.   The patient was prescribed custom orthotics that will aid in offloading the tension forces to the soft tissues and prevent further inflammation.  The patient will return for reevaluation if problems persist to determine if any further treatment will be needed.      Shaista verbalized agreement with and understanding of the rational for the diagnosis and treatment plan.  All questions were answered to best of my ability and the patient's satisfaction. The patient was advised to contact the clinic with any questions that may arise after the clinic visit.      Disclaimer: This note consists of symbols derived from keyboarding, dictation and/or voice recognition software. As a result, there may be errors in the script that have gone undetected. Please consider this when interpreting information found in this chart.       KRISHNA Singh D.P.M.,  [email or web address].        Again, thank you for allowing me to participate in the care of your patient.        Sincerely,        Luis Singh DPM

## 2022-05-02 ENCOUNTER — OFFICE VISIT (OUTPATIENT)
Dept: PALLIATIVE MEDICINE | Facility: CLINIC | Age: 57
End: 2022-05-02
Payer: COMMERCIAL

## 2022-05-02 VITALS — HEART RATE: 71 BPM | SYSTOLIC BLOOD PRESSURE: 141 MMHG | DIASTOLIC BLOOD PRESSURE: 99 MMHG

## 2022-05-02 DIAGNOSIS — M54.6 CHRONIC MIDLINE THORACIC BACK PAIN: ICD-10-CM

## 2022-05-02 DIAGNOSIS — G89.29 CHRONIC MIDLINE THORACIC BACK PAIN: ICD-10-CM

## 2022-05-02 PROCEDURE — 99204 OFFICE O/P NEW MOD 45 MIN: CPT | Performed by: STUDENT IN AN ORGANIZED HEALTH CARE EDUCATION/TRAINING PROGRAM

## 2022-05-02 ASSESSMENT — PAIN SCALES - GENERAL: PAINLEVEL: MILD PAIN (3)

## 2022-05-02 NOTE — PATIENT INSTRUCTIONS
I think your mid back pain is most likely due to degenerative changes in your thoracic back, muscular pain related to your work as well as possible nerve irritation.     For this we will:     - Re-start physical therapy  - Consider epidural steroid injection     Your next steps:  Schedule physical therapy. Katie will fax the order for you. You will be called to schedule this     Follow up:   - Call/MyChart if you want to do an epidural steroid injection   - 6-8 weeks in clinic    Darling Dia MD  [facility]      ----------------------------------------------------------------  Clinic Number:  [phone]   Call with any questions about your care and for scheduling assistance.   Calls are returned Monday through Friday between 8 AM and 4:30 PM. We usually get back to you within 2 business days depending on the issue/request.    If we are prescribing your medications:  For opioid medication refills, call the clinic or send a Kaybus message 7 days in advance.  Please include:  Name of requested medication  Name of the pharmacy.  For non-opioid medications, call your pharmacy directly to request a refill. Please allow 3-4 days to be processed.   Per [place] State Law:  All controlled substance prescriptions must be filled within 30 days of being written.    For those controlled substances allowing refills, pickup must occur within 30 days of last fill.      We believe regular attendance is key to your success in our program!    Any time you are unable to keep your appointment we ask that you call us at least 24 hours in advance to cancel.This will allow us to offer the appointment time to another patient.   Multiple missed appointments may lead to dismissal from the clinic.

## 2022-05-02 NOTE — PROGRESS NOTES
Shaista Villar  :  1965  DOS: 2022  MRN: [id number]    Medical Spine Clinic Visit    PCP: Deanna Cagle     Shaista Villar is a 56 year old female with a history of right sided chest wall pain referred by Deanna Cagle for: chronic thoracic back pain    INitially started experiencing pain anteriorly underneath R breast in her rib cage. Could feel a knot in her back just below shoulder blade  For 1 yr did massage, chiropractic, PT. Helped, but never 100%. Any prolonged activity is exacerbating.   Quit chiro, PT with COVID pandemic. Still does HEP, stretches.   Now has a stand up desk. Has a huge garden 45 acres. Ices every nigth. Needs to stop at least 1x per day. Needs to sit back to support her mid back and ice it.   In March had done some snow shoeing over the weekend. Monday had chest pains. Was told this is her chronic musculoskeletal pain. This is now resolved. Was given toradol in the ED and by the time she left it was better.     The pain is located in her mid back when she is slightly leaning. Pain is midline at T6 level and R of midline at that level and it radiates anteriorly underneath R breast. Feels like someone is pushing a screw . In the back. ANteriorly it feels like a sharp twinge. No numbness, tingling or weakness. Constant ache in the back. The stabbing pain comes with prolonged activity   Feels like someone is squeezing her rib cage underneath her breasts. This is intermittent    Other evaluation and/or treatments so far consists of: Quit chiro, PT with COVID pandemic. Still does HEP, stretches. .      Current pain medications:   - no    Other pertinent medications:  - no    Anticoagulants:  - no    Injections:   - no    History of Surgery in the painful area:   - no    Social History: . Works from home. Lots of desk and keyboard work.     Past Medical History:   Diagnosis Date     ASCUS on Pap smear 2005    (negative) HPV     Mild intermittent  asthma      SEASONAL ALLERGIES      Past Surgical History:   Procedure Laterality Date     APPENDECTOMY       COLONOSCOPY  5/12/2014    Procedure: COMBINED COLONOSCOPY, SINGLE BIOPSY/POLYPECTOMY BY BIOPSY;  Surgeon: Ridge Duke MD;  Location: [facility]     ESOPHAGOSCOPY, GASTROSCOPY, DUODENOSCOPY (EGD), COMBINED N/A 10/28/2019    Procedure: ESOPHAGOGASTRODUODENOSCOPY (EGD);  Surgeon: John Vargas MD;  Location: [facility]     SURGICAL HISTORY OF -   1991    Appy.     Family History   Problem Relation Age of Onset     Cardiovascular Maternal Grandfather      Allergies Brother      Respiratory Brother         asthma     Asthma Brother      Glaucoma Brother         glaucoma     Respiratory Father         COPD     Cancer - colorectal Father 76     Diabetes Maternal Grandmother      Hypertension Paternal Grandmother      Hypertension Brother        Objective  BP (!) 141/99   Pulse 71   LMP 03/25/2016       General: healthy, alert and in no distress      HEENT: no scleral icterus or conjunctival erythema     Skin: no suspicious lesions or rash. No jaundice.     CV: normal rate      Resp: normal respiratory effort without conversational dyspnea     Psych: normal mood and affect      Gait: nonantalgic, appropriate coordination and balance     Neuro: normal light touch sensory exam of the extremities. Motor strength as noted below     Low back exam:    THORACIC/LUMBAR SPINE  Inspection:    No gross deformity/asymmetry, scapular winging  Palpation:   No tenderness to palpation about the thoracic spinous processes, thoracic facet joints, left parathoracic muscles and right parathoracic muscles. However percussion over T6-7 SP causes some pressure in sternum.}  Strength:    able to heel walk, able to toe walk, 5/5 - quadriceps, hamstrings, tibialis anterior, gastrocsoleus, and extensor hallicus longus. 5/5 throughout bilateral upper extremities     Cervical spine:  Range of motion within normal limits   Myofascial  tenderness: no  No focal spinous process tenderness.   Spurling's negative bilaterally.    Shoulder exam: RIGHT  No shoulder girdle wasting or scapular dyskinesis. No palmar muscle wasting. No tenderness of bicipital groove, AC, GH, or SC joints. Shoulder range of motion within normal limits. Wheeler', Neers, and empty-can are negative.     Carpal Tunnel tests:   Tinel's negative    Ulnar neuropathy tests:   Tinel's at the ulnar groove negative    Reflexes:  Biceps normal, symmetric  Brachioradialis  normal, symmetric,   Triceps  normal, symmetric       patellar (L3, L4) symmetric normal       achilles tendons (S1) symmetric normal    no ankle clonus bilaterally  Knox's negative bilaterally    Sensation:      grossly intact throughout upper and lower extremities    Skin:       well perfused       capillary refill brisk    Radiology:  4/27/2022  IMPRESSION:    1. Degenerative disc changes in the midthoracic spine. No significant  disc herniation. No spinal canal or neural foraminal narrowing at any  level.  2. Mild endplate edema anteriorly at T6-T7. This finding has been  associated with a source of pain in some patients.    Assessment:  1. Chronic midline thoracic back pain      Chronic right-sided thoracic back and chest wall pain.  Likely multifactorial related to both postural considerations with her work as a  and substantial keyboard work.  MRI demonstrates endplate edema at T6-7, consistent with her location of pain this may be contributing.  Given the pattern of pain that wraps anteriorly on the right, possible radicular component as well. Pt is neurologically intact including upper and lower extremity strength and sensation.    Plan:  Discussed the assessment with the patient.  Reviewed imaging with patient in detail  PT referral  Consider T6-7 IL MARGOT for endplate edema and thoracic radiculitis.  Patient will consider this and call if she would like to schedule an injection  Follow up: 6 to 8  weeks in clinic    BILLING TIME DOCUMENTATION:   The total TIME spent on this patient on the date of the encounter/appointment was 51 minutes.      TOTAL TIME includes:   Time spent preparing to see the patient (reviewing records and tests) - 2 min  Time spent face to face (or over the phone) with the patient - 41 min  Time spent ordering tests, medications, procedures and referrals - 0 min  Time spent Referring and communicating with other healthcare professionals - 2 min  Time spent documenting clinical information in Epic - 6 min     Darling Dia MD  [facility]     Disclaimer: This note consists of symbols derived from keyboarding, dictation and/or voice recognition software. As a result, there may be errors in the script that have gone undetected. Please consider this when interpreting information found in this chart.

## 2022-05-03 ENCOUNTER — MYC MEDICAL ADVICE (OUTPATIENT)
Dept: PALLIATIVE MEDICINE | Facility: CLINIC | Age: 57
End: 2022-05-03
Payer: COMMERCIAL

## 2022-06-02 ENCOUNTER — TRANSFERRED RECORDS (OUTPATIENT)
Dept: HEALTH INFORMATION MANAGEMENT | Facility: CLINIC | Age: 57
End: 2022-06-02

## 2022-07-11 ENCOUNTER — TELEPHONE (OUTPATIENT)
Dept: PALLIATIVE MEDICINE | Facility: CLINIC | Age: 57
End: 2022-07-11

## 2022-07-11 NOTE — TELEPHONE ENCOUNTER
Received progress update from [facility]. Per cover sheet, no signature needed. Sent to scanning.

## 2022-08-12 ASSESSMENT — ENCOUNTER SYMPTOMS
CONSTIPATION: 0
HEADACHES: 0
NAUSEA: 0
ARTHRALGIAS: 0
WEAKNESS: 0
BREAST MASS: 0
PARESTHESIAS: 0
DIZZINESS: 0
FREQUENCY: 0
JOINT SWELLING: 0
HEMATOCHEZIA: 0
DYSURIA: 0
CHILLS: 0
COUGH: 0
SHORTNESS OF BREATH: 0
NERVOUS/ANXIOUS: 0
ABDOMINAL PAIN: 0
MYALGIAS: 0
EYE PAIN: 0
SORE THROAT: 0
FEVER: 0
HEARTBURN: 0
DIARRHEA: 0
HEMATURIA: 0
PALPITATIONS: 0

## 2022-08-19 ENCOUNTER — OFFICE VISIT (OUTPATIENT)
Dept: FAMILY MEDICINE | Facility: CLINIC | Age: 57
End: 2022-08-19
Payer: COMMERCIAL

## 2022-08-19 VITALS
RESPIRATION RATE: 16 BRPM | BODY MASS INDEX: 32.78 KG/M2 | WEIGHT: 192 LBS | SYSTOLIC BLOOD PRESSURE: 138 MMHG | TEMPERATURE: 97.7 F | HEART RATE: 64 BPM | DIASTOLIC BLOOD PRESSURE: 78 MMHG | HEIGHT: 64 IN

## 2022-08-19 DIAGNOSIS — J45.20 MILD INTERMITTENT ASTHMA WITHOUT COMPLICATION: ICD-10-CM

## 2022-08-19 DIAGNOSIS — Z00.00 ROUTINE GENERAL MEDICAL EXAMINATION AT A HEALTH CARE FACILITY: Primary | ICD-10-CM

## 2022-08-19 DIAGNOSIS — K21.9 GASTROESOPHAGEAL REFLUX DISEASE WITHOUT ESOPHAGITIS: ICD-10-CM

## 2022-08-19 LAB
ALBUMIN SERPL-MCNC: 4.1 G/DL (ref 3.4–5)
ALP SERPL-CCNC: 66 U/L (ref 40–150)
ALT SERPL W P-5'-P-CCNC: 23 U/L (ref 0–50)
ANION GAP SERPL CALCULATED.3IONS-SCNC: 6 MMOL/L (ref 3–14)
AST SERPL W P-5'-P-CCNC: 14 U/L (ref 0–45)
BILIRUB SERPL-MCNC: 0.6 MG/DL (ref 0.2–1.3)
BUN SERPL-MCNC: 13 MG/DL (ref 7–30)
CALCIUM SERPL-MCNC: 9.3 MG/DL (ref 8.5–10.1)
CHLORIDE BLD-SCNC: 103 MMOL/L (ref 94–109)
CHOLEST SERPL-MCNC: 200 MG/DL
CO2 SERPL-SCNC: 28 MMOL/L (ref 20–32)
CREAT SERPL-MCNC: 0.59 MG/DL (ref 0.52–1.04)
ERYTHROCYTE [DISTWIDTH] IN BLOOD BY AUTOMATED COUNT: 13.4 % (ref 10–15)
FASTING STATUS PATIENT QL REPORTED: ABNORMAL
GFR SERPL CREATININE-BSD FRML MDRD: >90 ML/MIN/1.73M2
GLUCOSE BLD-MCNC: 90 MG/DL (ref 70–99)
HCT VFR BLD AUTO: 43 % (ref 35–47)
HDLC SERPL-MCNC: 96 MG/DL
HGB BLD-MCNC: 14.4 G/DL (ref 11.7–15.7)
LDLC SERPL CALC-MCNC: 96 MG/DL
MCH RBC QN AUTO: 32.1 PG (ref 26.5–33)
MCHC RBC AUTO-ENTMCNC: 33.5 G/DL (ref 31.5–36.5)
MCV RBC AUTO: 96 FL (ref 78–100)
NONHDLC SERPL-MCNC: 104 MG/DL
PLATELET # BLD AUTO: 292 10E3/UL (ref 150–450)
POTASSIUM BLD-SCNC: 4.2 MMOL/L (ref 3.4–5.3)
PROT SERPL-MCNC: 7.8 G/DL (ref 6.8–8.8)
RBC # BLD AUTO: 4.49 10E6/UL (ref 3.8–5.2)
SODIUM SERPL-SCNC: 137 MMOL/L (ref 133–144)
TRIGL SERPL-MCNC: 42 MG/DL
WBC # BLD AUTO: 5.6 10E3/UL (ref 4–11)

## 2022-08-19 PROCEDURE — 80053 COMPREHEN METABOLIC PANEL: CPT | Performed by: FAMILY MEDICINE

## 2022-08-19 PROCEDURE — 85027 COMPLETE CBC AUTOMATED: CPT | Performed by: FAMILY MEDICINE

## 2022-08-19 PROCEDURE — 36415 COLL VENOUS BLD VENIPUNCTURE: CPT | Performed by: FAMILY MEDICINE

## 2022-08-19 PROCEDURE — 99214 OFFICE O/P EST MOD 30 MIN: CPT | Mod: 25 | Performed by: FAMILY MEDICINE

## 2022-08-19 PROCEDURE — 80061 LIPID PANEL: CPT | Performed by: FAMILY MEDICINE

## 2022-08-19 PROCEDURE — 99396 PREV VISIT EST AGE 40-64: CPT | Performed by: FAMILY MEDICINE

## 2022-08-19 RX ORDER — FAMOTIDINE 20 MG/1
20 TABLET, FILM COATED ORAL 2 TIMES DAILY
Qty: 180 TABLET | Refills: 1 | Status: SHIPPED | OUTPATIENT
Start: 2022-08-19 | End: 2022-10-03

## 2022-08-19 RX ORDER — ALBUTEROL SULFATE 90 UG/1
2 AEROSOL, METERED RESPIRATORY (INHALATION) EVERY 4 HOURS PRN
Qty: 18 G | Refills: 5 | Status: SHIPPED | OUTPATIENT
Start: 2022-08-19 | End: 2023-08-22

## 2022-08-19 ASSESSMENT — ENCOUNTER SYMPTOMS
DYSURIA: 0
JOINT SWELLING: 0
SHORTNESS OF BREATH: 0
NERVOUS/ANXIOUS: 0
NAUSEA: 0
CHILLS: 0
HEARTBURN: 0
PALPITATIONS: 0
ARTHRALGIAS: 0
SORE THROAT: 0
DIARRHEA: 0
EYE PAIN: 0
FEVER: 0
BREAST MASS: 0
HEMATURIA: 0
HEADACHES: 0
DIZZINESS: 0
CONSTIPATION: 0
MYALGIAS: 0
FREQUENCY: 0
PARESTHESIAS: 0
WEAKNESS: 0
HEMATOCHEZIA: 0
COUGH: 0
ABDOMINAL PAIN: 0

## 2022-08-19 ASSESSMENT — PAIN SCALES - GENERAL: PAINLEVEL: NO PAIN (0)

## 2022-08-19 NOTE — PROGRESS NOTES
SUBJECTIVE:   CC: Shaista Villar is an 56 year old woman who presents for preventive health visit.       Patient has been advised of split billing requirements and indicates understanding: Yes  Healthy Habits:     Getting at least 3 servings of Calcium per day:  Yes    Bi-annual eye exam:  Yes    Dental care twice a year:  Yes    Sleep apnea or symptoms of sleep apnea:  None    Diet:  Regular (no restrictions)    Frequency of exercise:  4-5 days/week    Duration of exercise:  15-30 minutes    Taking medications regularly:  Yes    Medication side effects:  None    PHQ-2 Total Score: 0    Additional concerns today:  No      Asthma Follow-Up    Was ACT completed today?    No   sxs are stable  ACT Total Scores 4/22/2022   ACT TOTAL SCORE -   ASTHMA ER VISITS -   ASTHMA HOSPITALIZATIONS -   ACT TOTAL SCORE (Goal Greater than or Equal to 20) 24   In the past 12 months, how many times did you visit the emergency room for your asthma without being admitted to the hospital? 0   In the past 12 months, how many times were you hospitalized overnight because of your asthma? 0          How many days per week do you miss taking your asthma controller medication?  I do not have an asthma controller medication    Please describe any recent triggers for your asthma: upper respiratory infections and pollens    Have you had any Emergency Room Visits, Urgent Care Visits, or Hospital Admissions since your last office visit?  No      Today's PHQ-2 Score:   PHQ-2 ( 1999 Pf[employer]) 8/12/2022   Q1: Little interest or pleasure in doing things 0   Q2: Feeling down, depressed or hopeless 0   PHQ-2 Score 0   PHQ-2 Total Score (12-17 Years)- Positive if 3 or more points; Administer PHQ-A if positive -   Q1: Little interest or pleasure in doing things Not at all   Q2: Feeling down, depressed or hopeless Not at all   PHQ-2 Score 0       Abuse: Current or Past (Physical, Sexual or Emotional) - No  Do you feel safe in your environment?  Yes        Social History     Tobacco Use     Smoking status: Never Smoker     Smokeless tobacco: Never Used   Substance Use Topics     Alcohol use: Yes     Comment: occas     If you drink alcohol do you typically have >3 drinks per day or >7 drinks per week? No    Alcohol Use 8/19/2022   Prescreen: >3 drinks/day or >7 drinks/week? -   Prescreen: >3 drinks/day or >7 drinks/week? No       Reviewed orders with patient.  Reviewed health maintenance and updated orders accordingly - Yes  Labs reviewed in EPIC    Breast Cancer Screening:    FHS-7:   Breast CA Risk Assessment (FHS-7) 7/25/2021 9/20/2021 8/12/2022   Did any of your first-degree relatives have breast or ovarian cancer? No No No   Did any of your relatives have bilateral breast cancer? No No No   Did any man in your family have breast cancer? No No No   Did any woman in your family have breast and ovarian cancer? No No No   Did any woman in your family have breast cancer before age 50 y? No No No   Do you have 2 or more relatives with breast and/or ovarian cancer? No No No   Do you have 2 or more relatives with breast and/or bowel cancer? No No No       Mammogram Screening: Recommended mammography every 1-2 years with patient discussion and risk factor consideration  Pertinent mammograms are reviewed under the imaging tab.    History of abnormal Pap smear: NO - age 30-65 PAP every 5 years with negative HPV co-testing recommended  PAP / HPV Latest Ref Rng & Units 7/27/2021 7/20/2018 6/12/2015   PAP   Negative for Intraepithelial Lesion or Malignancy (NILM) - -   PAP (Historical) - - NIL NIL   HPV16 Negative Negative Negative Negative   HPV18 Negative Negative Negative Negative   HRHPV Negative Negative Negative Negative     Reviewed and updated as needed this visit by clinical staff   Tobacco  Allergies  Meds  Problems  Med Hx  Surg Hx  Fam Hx  Soc   Hx          Reviewed and updated as needed this visit by Provider   Tobacco  Allergies  Meds   "Problems  Med Hx  Surg Hx  Fam Hx               Review of Systems   Constitutional: Negative for chills and fever.   HENT: Negative for congestion, ear pain, hearing loss and sore throat.    Eyes: Negative for pain and visual disturbance.   Respiratory: Negative for cough and shortness of breath.    Cardiovascular: Negative for chest pain, palpitations and peripheral edema.   Gastrointestinal: Negative for abdominal pain, constipation, diarrhea, heartburn, hematochezia and nausea.   Breasts:  Negative for tenderness, breast mass and discharge.   Genitourinary: Negative for dysuria, frequency, genital sores, hematuria, pelvic pain, urgency, vaginal bleeding and vaginal discharge.   Musculoskeletal: Negative for arthralgias, joint swelling and myalgias.   Skin: Negative for rash.   Neurological: Negative for dizziness, weakness, headaches and paresthesias.   Psychiatric/Behavioral: Negative for mood changes. The patient is not nervous/anxious.           OBJECTIVE:   /78   Pulse 64   Temp 97.7  F (36.5  C) (Tympanic)   Resp 16   Ht 1.626 m (5' 4\")   Wt 87.1 kg (192 lb)   LMP 03/25/2016   BMI 32.96 kg/m    Physical Exam  GENERAL APPEARANCE: healthy, alert and no distress  EYES: Eyes grossly normal to inspection, PERRL and conjunctivae and sclerae normal  HENT: ear canals and TM's normal, nose and mouth without ulcers or lesions, oropharynx clear and oral mucous membranes moist  NECK: no adenopathy, no asymmetry, masses, or scars and thyroid normal to palpation  RESP: lungs clear to auscultation - no rales, rhonchi or wheezes  BREAST: normal without masses, tenderness or nipple discharge and no palpable axillary masses or adenopathy  CV: regular rate and rhythm, normal S1 S2, no S3 or S4, no murmur, click or rub, no peripheral edema and peripheral pulses strong  ABDOMEN: soft, nontender, no hepatosplenomegaly, no masses and bowel sounds normal  MS: no musculoskeletal defects are noted and gait is age " "appropriate without ataxia  SKIN: no suspicious lesions or rashes  NEURO: Normal strength and tone, sensory exam grossly normal, mentation intact and speech normal  PSYCH: mentation appears normal and affect normal/bright    Diagnostic Test Results:  Labs reviewed in Epic    ASSESSMENT/PLAN:   (Z00.00) Routine general medical examination at a health care facility  (primary encounter diagnosis)  Comment:   Plan: Lipid panel reflex to direct LDL Fasting            (J45.20) Mild intermittent asthma without complication  Comment: Stable no change in treatment plan.   Plan: REVIEW OF HEALTH MAINTENANCE PROTOCOL ORDERS,         albuterol (PROAIR HFA/PROVENTIL HFA/VENTOLIN         HFA) 108 (90 Base) MCG/ACT inhaler            (K21.9) Gastroesophageal reflux disease without esophagitis  Comment: would like to wean off of PPI   Will try pepcid bid if break through sxs she will let me know   Plan: famotidine (PEPCID) 20 MG tablet, CBC with         platelets, Comprehensive metabolic panel              Patient has been advised of split billing requirements and indicates understanding: Yes    COUNSELING:  Reviewed preventive health counseling, as reflected in patient instructions    Estimated body mass index is 32.96 kg/m  as calculated from the following:    Height as of this encounter: 1.626 m (5' 4\").    Weight as of this encounter: 87.1 kg (192 lb).    Weight management plan: Discussed healthy diet and exercise guidelines    She reports that she has never smoked. She has never used smokeless tobacco.      Counseling Resources:  ATP IV Guidelines  Pooled Cohorts Equation Calculator  Breast Cancer Risk Calculator  BRCA-Related Cancer Risk Assessment: FHS-7 Tool  FRAX Risk Assessment  ICSI Preventive Guidelines  Dietary Guidelines for Americans, 2010  USDA's MyPlate  ASA Prophylaxis  Lung CA Screening    Deanna Cagle MD  [facility]  "

## 2022-08-19 NOTE — LETTER
My Asthma Action Plan    Name: Shaista Villar   YOB: 1965  Date: 8/19/2022   My doctor: Deanna Cagle MD   My clinic: [facility]        My Rescue Medicine:   Albuterol inhaler (Proair/Ventolin/Proventil HFA)  2-4 puffs EVERY 4 HOURS as needed. Use a spacer if recommended by your provider.   My Asthma Severity:   Intermittent / Exercise Induced  Know your asthma triggers: Patient is unaware of triggers  smoke  pollens          GREEN ZONE   Good Control    I feel good    No cough or wheeze    Can work, sleep and play without asthma symptoms       Take your asthma control medicine every day.     1. If exercise triggers your asthma, take your rescue medication    15 minutes before exercise or sports, and    During exercise if you have asthma symptoms  2. Spacer to use with inhaler: If you have a spacer, make sure to use it with your inhaler             YELLOW ZONE Getting Worse  I have ANY of these:    I do not feel good    Cough or wheeze    Chest feels tight    Wake up at night   1. Keep taking your Green Zone medications  2. Start taking your rescue medicine:    every 20 minutes for up to 1 hour. Then every 4 hours for 24-48 hours.  3. If you stay in the Yellow Zone for more than 12-24 hours, contact your doctor.  4. If you do not return to the Green Zone in 12-24 hours or you get worse, start taking your oral steroid medicine if prescribed by your provider.           RED ZONE Medical Alert - Get Help  I have ANY of these:    I feel awful    Medicine is not helping    Breathing getting harder    Trouble walking or talking    Nose opens wide to breathe       1. Take your rescue medicine NOW  2. If your provider has prescribed an oral steroid medicine, start taking it NOW  3. Call your doctor NOW  4. If you are still in the Red Zone after 20 minutes and you have not reached your doctor:    Take your rescue medicine again and    Call 911 or go to the emergency room  right away    See your regular doctor within 2 weeks of an Emergency Room or Urgent Care visit for follow-up treatment.          Annual Reminders:  Meet with Asthma Educator,  Flu Shot in the Fall, consider Pneumonia Vaccination for patients with asthma (aged 19 and older).    Pharmacy:    [id number] - [place], [place] - [address] [place] [address]  [facility] - MAIL ORDER MAINT MEDS - NON-EPRESCRIBE  [facility] - [place], [place] - [address] AT PORTAL TO REGISTERED [facility] SITES  [facility] - [place], [place] - [address] [place] [address]    Electronically signed by Deanna Cagle MD   Date: 08/19/22                    Asthma Triggers  How To Control Things That Make Your Asthma Worse    Triggers are things that make your asthma worse.  Look at the list below to help you find your triggers and   what you can do about them. You can help prevent asthma flare-ups by staying away from your triggers.      Trigger                                                          What you can do   Cigarette Smoke  Tobacco smoke can make asthma worse. Do not allow smoking in your home, car or around you.  Be sure no one smokes at a child s day care or school.  If you smoke, ask your health care provider for ways to help you quit.  Ask family members to quit too.  Ask your health care provider for a referral to Quit Plan to help you quit smoking, or call 5-660-066-PLAN.     Colds, Flu, Bronchitis  These are common triggers of asthma. Wash your hands often.  Don t touch your eyes, nose or mouth.  Get a flu shot every year.     Dust Mites  These are tiny bugs that live in cloth or carpet. They are too small to see. Wash sheets and blankets in hot water every week.   Encase pillows and mattress in dust mite proof covers.  Avoid having carpet if you can. If you have carpet, vacuum weekly.   Use a dust mask and HEPA vacuum.   Pollen and Outdoor Mold  Some people  are allergic to trees, grass, or weed pollen, or molds. Try to keep your windows closed.  Limit time out doors when pollen count is high.   Ask you health care provider about taking medicine during allergy season.     Animal Dander  Some people are allergic to skin flakes, urine or saliva from pets with fur or feathers. Keep pets with fur or feathers out of your home.    If you can t keep the pet outdoors, then keep the pet out of your bedroom.  Keep the bedroom door closed.  Keep pets off cloth furniture and away from stuffed toys.     Mice, Rats, and Cockroaches  Some people are allergic to the waste from these pests.   Cover food and garbage.  Clean up spills and food crumbs.  Store grease in the refrigerator.   Keep food out of the bedroom.   Indoor Mold  This can be a trigger if your home has high moisture. Fix leaking faucets, pipes, or other sources of water.   Clean moldy surfaces.  Dehumidify basement if it is damp and smelly.   Smoke, Strong Odors, and Sprays  These can reduce air quality. Stay away from strong odors and sprays, such as perfume, powder, hair spray, paints, smoke incense, paint, cleaning products, candles and new carpet.   Exercise or Sports  Some people with asthma have this trigger. Be active!  Ask your doctor about taking medicine before sports or exercise to prevent symptoms.    Warm up for 5-10 minutes before and after sports or exercise.     Other Triggers of Asthma  Cold air:  Cover your nose and mouth with a scarf.  Sometimes laughing or crying can be a trigger.  Some medicines and food can trigger asthma.

## 2022-09-26 ENCOUNTER — MYC MEDICAL ADVICE (OUTPATIENT)
Dept: FAMILY MEDICINE | Facility: CLINIC | Age: 57
End: 2022-09-26

## 2022-10-04 ENCOUNTER — HOSPITAL ENCOUNTER (OUTPATIENT)
Dept: MAMMOGRAPHY | Facility: CLINIC | Age: 57
Discharge: HOME | End: 2022-10-04
Attending: FAMILY MEDICINE | Admitting: FAMILY MEDICINE
Payer: COMMERCIAL

## 2022-10-04 DIAGNOSIS — Z12.31 ENCOUNTER FOR SCREENING MAMMOGRAM FOR MALIGNANT NEOPLASM OF BREAST: ICD-10-CM

## 2022-10-04 PROCEDURE — 77067 SCR MAMMO BI INCL CAD: CPT

## 2022-10-07 ENCOUNTER — IMMUNIZATION (OUTPATIENT)
Dept: FAMILY MEDICINE | Facility: CLINIC | Age: 57
End: 2022-10-07
Payer: COMMERCIAL

## 2022-10-07 DIAGNOSIS — Z23 NEED FOR PROPHYLACTIC VACCINATION AND INOCULATION AGAINST INFLUENZA: Primary | ICD-10-CM

## 2022-10-07 PROCEDURE — 90682 RIV4 VACC RECOMBINANT DNA IM: CPT

## 2022-10-07 PROCEDURE — 90471 IMMUNIZATION ADMIN: CPT

## 2022-10-07 PROCEDURE — 99207 PR NO CHARGE NURSE ONLY: CPT

## 2022-10-12 PROBLEM — M17.11 PRIMARY OSTEOARTHRITIS OF RIGHT KNEE: Status: ACTIVE | Noted: 2022-10-12

## 2022-10-12 PROBLEM — M51.34 DEGENERATION OF THORACIC INTERVERTEBRAL DISC: Status: ACTIVE | Noted: 2022-10-12

## 2023-01-15 ENCOUNTER — E-VISIT (OUTPATIENT)
Dept: URGENT CARE | Facility: CLINIC | Age: 58
End: 2023-01-15
Payer: COMMERCIAL

## 2023-01-15 DIAGNOSIS — R30.0 DIFFICULT OR PAINFUL URINATION: Primary | ICD-10-CM

## 2023-01-15 PROCEDURE — 99421 OL DIG E/M SVC 5-10 MIN: CPT | Performed by: PHYSICIAN ASSISTANT

## 2023-01-15 NOTE — PATIENT INSTRUCTIONS
Dear Shaista Villar,     After reviewing your responses, I would like you to come in for a urine test to make sure we treat you correctly. This urine test is to evaluate you for a possible urinary tract infection, and should be scheduled for today or tomorrow. Schedule a Lab Only appointment here.     Lab appointments are not available at most locations on the weekends. If no Lab Only appointment is available, you should be seen in any of our convenient Walk-in or Urgent Care Centers, which can be found on our website here.     You will receive instructions with your results in [employer] once they are available.     If your symptoms worsen, you develop pain in your back or stomach, develop fevers, or are not improving in 5 days, please contact your primary care provider for an appointment or visit a Walk-in or Urgent Care Center to be seen.     Thanks again for choosing us as your health care partner,     Danette Barney PA-C

## 2023-01-17 ENCOUNTER — ALLIED HEALTH/NURSE VISIT (OUTPATIENT)
Dept: FAMILY MEDICINE | Facility: CLINIC | Age: 58
End: 2023-01-17
Payer: COMMERCIAL

## 2023-01-17 DIAGNOSIS — R30.0 DYSURIA: Primary | ICD-10-CM

## 2023-01-17 DIAGNOSIS — N39.0 URINARY TRACT INFECTION WITHOUT HEMATURIA, SITE UNSPECIFIED: Primary | ICD-10-CM

## 2023-01-17 DIAGNOSIS — R30.0 DYSURIA: ICD-10-CM

## 2023-01-17 LAB
ALBUMIN UR-MCNC: NEGATIVE MG/DL
APPEARANCE UR: CLEAR
BILIRUB UR QL STRIP: NEGATIVE
COLOR UR AUTO: YELLOW
GLUCOSE UR STRIP-MCNC: NEGATIVE MG/DL
HGB UR QL STRIP: ABNORMAL
KETONES UR STRIP-MCNC: NEGATIVE MG/DL
LEUKOCYTE ESTERASE UR QL STRIP: ABNORMAL
NITRATE UR QL: NEGATIVE
PH UR STRIP: 6 [PH] (ref 5–7)
RBC #/AREA URNS AUTO: ABNORMAL /HPF
SP GR UR STRIP: <=1.005 (ref 1–1.03)
SQUAMOUS #/AREA URNS AUTO: ABNORMAL /LPF
UROBILINOGEN UR STRIP-ACNC: 0.2 E.U./DL
WBC #/AREA URNS AUTO: ABNORMAL /HPF

## 2023-01-17 PROCEDURE — 81001 URINALYSIS AUTO W/SCOPE: CPT

## 2023-01-17 PROCEDURE — 99207 PR NO CHARGE NURSE ONLY: CPT

## 2023-01-17 PROCEDURE — 87186 SC STD MICRODIL/AGAR DIL: CPT

## 2023-01-17 PROCEDURE — 87086 URINE CULTURE/COLONY COUNT: CPT

## 2023-01-17 RX ORDER — NITROFURANTOIN 25; 75 MG/1; MG/1
100 CAPSULE ORAL 2 TIMES DAILY
Qty: 14 CAPSULE | Refills: 0 | Status: SHIPPED | OUTPATIENT
Start: 2023-01-17 | End: 2023-08-22

## 2023-01-18 LAB — BACTERIA UR CULT: ABNORMAL

## 2023-03-07 ENCOUNTER — OFFICE VISIT (OUTPATIENT)
Dept: URGENT CARE | Facility: URGENT CARE | Age: 58
End: 2023-03-07
Payer: COMMERCIAL

## 2023-03-07 VITALS
TEMPERATURE: 97.5 F | DIASTOLIC BLOOD PRESSURE: 95 MMHG | SYSTOLIC BLOOD PRESSURE: 168 MMHG | OXYGEN SATURATION: 98 % | WEIGHT: 192 LBS | HEART RATE: 85 BPM | BODY MASS INDEX: 32.96 KG/M2

## 2023-03-07 DIAGNOSIS — R30.0 DYSURIA: Primary | ICD-10-CM

## 2023-03-07 LAB
ALBUMIN UR-MCNC: NEGATIVE MG/DL
APPEARANCE UR: CLEAR
BILIRUB UR QL STRIP: NEGATIVE
CLUE CELLS: NORMAL
COLOR UR AUTO: YELLOW
GLUCOSE UR STRIP-MCNC: NEGATIVE MG/DL
HGB UR QL STRIP: NEGATIVE
KETONES UR STRIP-MCNC: NEGATIVE MG/DL
LEUKOCYTE ESTERASE UR QL STRIP: NEGATIVE
NITRATE UR QL: NEGATIVE
PH UR STRIP: 7 [PH] (ref 5–7)
SP GR UR STRIP: <=1.005 (ref 1–1.03)
TRICHOMONAS, WET PREP: NORMAL
UROBILINOGEN UR STRIP-ACNC: 0.2 E.U./DL
WBC'S/HIGH POWER FIELD, WET PREP: NORMAL
YEAST, WET PREP: NORMAL

## 2023-03-07 PROCEDURE — 87210 SMEAR WET MOUNT SALINE/INK: CPT | Performed by: PHYSICIAN ASSISTANT

## 2023-03-07 PROCEDURE — 81003 URINALYSIS AUTO W/O SCOPE: CPT | Performed by: PHYSICIAN ASSISTANT

## 2023-03-07 PROCEDURE — 99213 OFFICE O/P EST LOW 20 MIN: CPT | Performed by: PHYSICIAN ASSISTANT

## 2023-03-07 ASSESSMENT — ENCOUNTER SYMPTOMS
DYSURIA: 1
FREQUENCY: 1
NAUSEA: 0
ABDOMINAL PAIN: 1
FATIGUE: 1
HEADACHES: 0
FEVER: 0
DIARRHEA: 0
CONSTIPATION: 0
MYALGIAS: 0
APPETITE CHANGE: 0
VOMITING: 0

## 2023-03-07 NOTE — PROGRESS NOTES
SUBJECTIVE:   Shaista Villar is a 57 year old female presenting with a chief complaint of   Chief Complaint   Patient presents with     Dysuria     Starting last night, sharp pains in the lower abdomen, today pain with urination, pressure, frequency and urgency.  Treated for UTI with Macrobid in Jan, worked well.       She is an established patient of [facility].  Patient presents with symptoms since last night.  Stabbing suprapubic pain and dysuria.  No fevers, no vaginal discharge, no back pain,, no n/v/d/c. Last UTI was 1/17/23.      Treatment:  Cranberry juice, water increase.      Review of Systems   Constitutional: Positive for fatigue. Negative for appetite change and fever.   Gastrointestinal: Positive for abdominal pain. Negative for constipation, diarrhea, nausea and vomiting.   Genitourinary: Positive for dysuria and frequency. Negative for vaginal discharge.   Musculoskeletal: Negative for myalgias.   Neurological: Negative for headaches.   All other systems reviewed and are negative.      Past Medical History:   Diagnosis Date     ASCUS on Pap smear 4/2005    (negative) HPV     Degeneration of thoracic intervertebral disc 10/12/2022     Mild intermittent asthma      SEASONAL ALLERGIES      Family History   Problem Relation Age of Onset     Cardiovascular Maternal Grandfather      Allergies Brother      Respiratory Brother         asthma     Asthma Brother      Glaucoma Brother         glaucoma     Respiratory Father         COPD     Cancer - colorectal Father 76     Diabetes Maternal Grandmother      Hypertension Paternal Grandmother      Hypertension Brother      Current Outpatient Medications   Medication Sig Dispense Refill     albuterol (PROAIR HFA/PROVENTIL HFA/VENTOLIN HFA) 108 (90 Base) MCG/ACT inhaler Inhale 2 puffs into the lungs every 4 hours as needed for shortness of breath / dyspnea 18 g 5     calcium carbonate (OS-MARLENI) 500 MG tablet Take 1 tablet by mouth 2 times daily        loratadine-pseudoePHEDrine (CLARITIN-D 24-HOUR)  MG 24 hr tablet Take 1 tablet by mouth daily       magnesium 250 MG tablet Take 1 tablet by mouth daily 85 mg       MULTIVITAMIN OR one daily       omeprazole (PRILOSEC) 20 MG DR capsule Take 1 capsule (20 mg) by mouth daily 90 capsule 3     omeprazole (PRILOSEC) 20 MG DR capsule TAKE 1 CAPSULE BY MOUTH DAILY 90 capsule 0     vitamin (B COMPLEX-C) tablet        VITAMIN C 100 MG OR TABS prn       Vitamin D, Cholecalciferol, 1000 UNITS TABS Take 1 tablet by mouth daily       ZINC 10 MG OR TABS prn       nitroFURantoin macrocrystal-monohydrate (MACROBID) 100 MG capsule Take 1 capsule (100 mg) by mouth 2 times daily (Patient not taking: Reported on 3/7/2023) 14 capsule 0     Social History     Tobacco Use     Smoking status: Never     Smokeless tobacco: Never   Substance Use Topics     Alcohol use: Yes     Comment: occas       OBJECTIVE  BP (!) 168/95   Pulse 85   Temp 97.5  F (36.4  C) (Tympanic)   Wt 87.1 kg (192 lb)   LMP 03/25/2016   SpO2 98%   BMI 32.96 kg/m      Physical Exam  Vitals and nursing note reviewed.   Constitutional:       General: She is not in acute distress.     Appearance: Normal appearance. She is obese. She is not ill-appearing.   Eyes:      Extraocular Movements: Extraocular movements intact.      Conjunctiva/sclera: Conjunctivae normal.   Cardiovascular:      Rate and Rhythm: Normal rate and regular rhythm.      Pulses: Normal pulses.      Heart sounds: Normal heart sounds.   Pulmonary:      Effort: Pulmonary effort is normal.      Breath sounds: Normal breath sounds.   Abdominal:      Tenderness: There is no abdominal tenderness.      Comments: No significant suprapubic tenderness with palpation.   Skin:     General: Skin is warm and dry.   Neurological:      General: No focal deficit present.      Mental Status: She is alert.   Psychiatric:         Mood and Affect: Mood normal.         Behavior: Behavior normal.          Labs:  Results for orders placed or performed in visit on 03/07/23 (from the past 24 hour(s))   UA macro with reflex to Microscopic and Culture - Clinc Collect    Specimen: Urine, Clean Catch   Result Value Ref Range    Color Urine Yellow Colorless, Straw, Light Yellow, Yellow    Appearance Urine Clear Clear    Glucose Urine Negative Negative mg/dL    Bilirubin Urine Negative Negative    Ketones Urine Negative Negative mg/dL    Specific Gravity Urine <=1.005 1.003 - 1.035    Blood Urine Negative Negative    pH Urine 7.0 5.0 - 7.0    Protein Albumin Urine Negative Negative mg/dL    Urobilinogen Urine 0.2 0.2, 1.0 E.U./dL    Nitrite Urine Negative Negative    Leukocyte Esterase Urine Negative Negative    Narrative    Microscopic not indicated   Wet prep - Clinic Collect    Specimen: Vagina; Swab   Result Value Ref Range    Trichomonas Absent Absent    Yeast Absent Absent    Clue Cells Absent Absent    WBCs/high power field None None       X-Ray was not done.    ASSESSMENT:      ICD-10-CM    1. Dysuria  R30.0 UA macro with reflex to Microscopic and Culture - Clinc Collect     Wet prep - Clinic Collect           Medical Decision Making:    Differential Diagnosis:  UTI: UTI, Dysuria, Urethritis and Vaginitis    Serious Comorbid Conditions:  Adult:  reviewed    PLAN:    Patient is in rather disbelief of the labs.  I've discussed other causes of urethritis.  Her exam is rather benign, eliciting no reproduction of symptoms.  I've offered to order an US, patient stated that she did not have time for an US.   I've recommended trying some tylenol and/or motrin as well as azo.  Discussed red flag symptoms and when to go to ED.  Patient is upset and insists she has a UTI.       Followup:    If not improving or if condition worsens, follow up with your Primary Care Provider, If not improving or if conditions worsens over the next 12-24 hours, go to the Emergency Department    There are no Patient Instructions on file for  this visit.

## 2023-05-16 ENCOUNTER — MYC MEDICAL ADVICE (OUTPATIENT)
Dept: FAMILY MEDICINE | Facility: CLINIC | Age: 58
End: 2023-05-16
Payer: COMMERCIAL

## 2023-05-16 DIAGNOSIS — M21.611 BUNION, RIGHT: Primary | ICD-10-CM

## 2023-08-17 ASSESSMENT — ENCOUNTER SYMPTOMS
HEADACHES: 0
CHILLS: 0
SORE THROAT: 0
ARTHRALGIAS: 0
DIARRHEA: 0
EYE PAIN: 0
WEAKNESS: 0
CONSTIPATION: 0
BREAST MASS: 0
SHORTNESS OF BREATH: 0
MYALGIAS: 0
PALPITATIONS: 0
DIZZINESS: 0
HEARTBURN: 0
JOINT SWELLING: 0
FREQUENCY: 0
HEMATOCHEZIA: 0
NAUSEA: 0
PARESTHESIAS: 0
DYSURIA: 0
ABDOMINAL PAIN: 0
COUGH: 0
NERVOUS/ANXIOUS: 0
FEVER: 0
HEMATURIA: 0

## 2023-08-17 ASSESSMENT — ASTHMA QUESTIONNAIRES: ACT_TOTALSCORE: 22

## 2023-08-22 ENCOUNTER — OFFICE VISIT (OUTPATIENT)
Dept: FAMILY MEDICINE | Facility: CLINIC | Age: 58
End: 2023-08-22
Payer: COMMERCIAL

## 2023-08-22 VITALS
SYSTOLIC BLOOD PRESSURE: 132 MMHG | BODY MASS INDEX: 31.41 KG/M2 | WEIGHT: 184 LBS | TEMPERATURE: 97.8 F | HEART RATE: 67 BPM | HEIGHT: 64 IN | OXYGEN SATURATION: 99 % | DIASTOLIC BLOOD PRESSURE: 82 MMHG | RESPIRATION RATE: 12 BRPM

## 2023-08-22 DIAGNOSIS — Z78.0 POSTMENOPAUSAL STATUS: ICD-10-CM

## 2023-08-22 DIAGNOSIS — K21.9 GASTROESOPHAGEAL REFLUX DISEASE, UNSPECIFIED WHETHER ESOPHAGITIS PRESENT: ICD-10-CM

## 2023-08-22 DIAGNOSIS — J45.20 MILD INTERMITTENT ASTHMA WITHOUT COMPLICATION: ICD-10-CM

## 2023-08-22 DIAGNOSIS — Z00.00 ROUTINE GENERAL MEDICAL EXAMINATION AT A HEALTH CARE FACILITY: Primary | ICD-10-CM

## 2023-08-22 LAB
ALBUMIN SERPL BCG-MCNC: 4.7 G/DL (ref 3.5–5.2)
ALP SERPL-CCNC: 68 U/L (ref 35–104)
ALT SERPL W P-5'-P-CCNC: 15 U/L (ref 0–50)
ANION GAP SERPL CALCULATED.3IONS-SCNC: 10 MMOL/L (ref 7–15)
AST SERPL W P-5'-P-CCNC: 21 U/L (ref 0–45)
BILIRUB SERPL-MCNC: 0.7 MG/DL
BUN SERPL-MCNC: 10.3 MG/DL (ref 8–23)
CALCIUM SERPL-MCNC: 10.2 MG/DL (ref 8.6–10)
CHLORIDE SERPL-SCNC: 99 MMOL/L (ref 98–107)
CHOLEST SERPL-MCNC: 206 MG/DL
CREAT SERPL-MCNC: 0.65 MG/DL (ref 0.51–0.95)
DEPRECATED HCO3 PLAS-SCNC: 27 MMOL/L (ref 22–29)
ERYTHROCYTE [DISTWIDTH] IN BLOOD BY AUTOMATED COUNT: 13.8 % (ref 10–15)
GFR SERPL CREATININE-BSD FRML MDRD: >90 ML/MIN/1.73M2
GLUCOSE SERPL-MCNC: 90 MG/DL (ref 70–99)
HCT VFR BLD AUTO: 42.1 % (ref 35–47)
HDLC SERPL-MCNC: 101 MG/DL
HGB BLD-MCNC: 14.3 G/DL (ref 11.7–15.7)
LDLC SERPL CALC-MCNC: 96 MG/DL
MCH RBC QN AUTO: 31.9 PG (ref 26.5–33)
MCHC RBC AUTO-ENTMCNC: 34 G/DL (ref 31.5–36.5)
MCV RBC AUTO: 94 FL (ref 78–100)
NONHDLC SERPL-MCNC: 105 MG/DL
PLATELET # BLD AUTO: 299 10E3/UL (ref 150–450)
POTASSIUM SERPL-SCNC: 4.2 MMOL/L (ref 3.4–5.3)
PROT SERPL-MCNC: 7.5 G/DL (ref 6.4–8.3)
RBC # BLD AUTO: 4.48 10E6/UL (ref 3.8–5.2)
SODIUM SERPL-SCNC: 136 MMOL/L (ref 136–145)
TRIGL SERPL-MCNC: 47 MG/DL
WBC # BLD AUTO: 4.6 10E3/UL (ref 4–11)

## 2023-08-22 PROCEDURE — 99396 PREV VISIT EST AGE 40-64: CPT | Performed by: FAMILY MEDICINE

## 2023-08-22 PROCEDURE — 85027 COMPLETE CBC AUTOMATED: CPT | Performed by: FAMILY MEDICINE

## 2023-08-22 PROCEDURE — 80053 COMPREHEN METABOLIC PANEL: CPT | Performed by: FAMILY MEDICINE

## 2023-08-22 PROCEDURE — 99214 OFFICE O/P EST MOD 30 MIN: CPT | Mod: 25 | Performed by: FAMILY MEDICINE

## 2023-08-22 PROCEDURE — 36415 COLL VENOUS BLD VENIPUNCTURE: CPT | Performed by: FAMILY MEDICINE

## 2023-08-22 PROCEDURE — 80061 LIPID PANEL: CPT | Performed by: FAMILY MEDICINE

## 2023-08-22 RX ORDER — ALBUTEROL SULFATE 90 UG/1
2 AEROSOL, METERED RESPIRATORY (INHALATION) EVERY 4 HOURS PRN
Qty: 18 G | Refills: 11 | Status: SHIPPED | OUTPATIENT
Start: 2023-08-22 | End: 2024-08-23

## 2023-08-22 ASSESSMENT — ENCOUNTER SYMPTOMS
MYALGIAS: 0
PALPITATIONS: 0
DIZZINESS: 0
JOINT SWELLING: 0
FEVER: 0
HEMATOCHEZIA: 0
CHILLS: 0
NERVOUS/ANXIOUS: 0
BREAST MASS: 0
NAUSEA: 0
COUGH: 0
HEADACHES: 0
ABDOMINAL PAIN: 0
FREQUENCY: 0
DIARRHEA: 0
SHORTNESS OF BREATH: 0
HEMATURIA: 0
SORE THROAT: 0
WEAKNESS: 0
HEARTBURN: 0
CONSTIPATION: 0
DYSURIA: 0
EYE PAIN: 0
PARESTHESIAS: 0
ARTHRALGIAS: 0

## 2023-08-22 ASSESSMENT — PAIN SCALES - GENERAL: PAINLEVEL: NO PAIN (0)

## 2023-08-22 NOTE — NURSING NOTE
"Chief Complaint   Patient presents with    Physical     BP (!) 150/94   Pulse 67   Temp 97.8  F (36.6  C) (Tympanic)   Resp 12   Ht 1.626 m (5' 4\")   Wt 83.5 kg (184 lb)   LMP 03/25/2016   SpO2 99%   BMI 31.58 kg/m   Estimated body mass index is 31.58 kg/m  as calculated from the following:    Height as of this encounter: 1.626 m (5' 4\").    Weight as of this encounter: 83.5 kg (184 lb).  Patient presents to the clinic using No DME      Health Maintenance that is potentially due pending provider review:    Health Maintenance Due   Topic Date Due    HEPATITIS B IMMUNIZATION (1 of 3 - 3-dose series) Never done    ANNUAL REVIEW OF HM ORDERS  08/19/2023    YEARLY PREVENTIVE VISIT  08/19/2023    ASTHMA ACTION PLAN  08/19/2023        n/a          "

## 2023-08-22 NOTE — PROGRESS NOTES
SUBJECTIVE:   CC: Shaista is an 57 year old who presents for preventive health visit.       8/22/2023     8:13 AM   Additional Questions   Roomed by Veronica HIGGINS   Accompanied by Self         8/22/2023     8:13 AM   Patient Reported Additional Medications   Patient reports taking the following new medications .       Healthy Habits:     Getting at least 3 servings of Calcium per day:  Yes    Bi-annual eye exam:  Yes    Dental care twice a year:  Yes    Sleep apnea or symptoms of sleep apnea:  None    Diet:  Regular (no restrictions)    Frequency of exercise:  4-5 days/week    Duration of exercise:  15-30 minutes    Taking medications regularly:  Yes    Medication side effects:  Not applicable    Additional concerns today:  No      Today's PHQ-2 Score:       8/21/2023     4:56 PM   PHQ-2 ( 1999 [employer])   Q1: Little interest or pleasure in doing things 0   Q2: Feeling down, depressed or hopeless 0   PHQ-2 Score 0   Q1: Little interest or pleasure in doing things Not at all   Q2: Feeling down, depressed or hopeless Not at all   PHQ-2 Score 0       GERD   Doing well on ppi   Tried prevacid and break through sxs     Asthma Follow-Up    Was ACT completed today?  Yes        8/17/2023    10:52 AM   ACT Total Scores   ACT TOTAL SCORE (Goal Greater than or Equal to 20) 22   In the past 12 months, how many times did you visit the emergency room for your asthma without being admitted to the hospital? 0   In the past 12 months, how many times were you hospitalized overnight because of your asthma? 0        How many days per week do you miss taking your asthma controller medication?  I do not have an asthma controller medication  Please describe any recent triggers for your asthma: smoke, pollens, mold, humidity, strong odors and fumes, and cold air  Have you had any Emergency Room Visits, Urgent Care Visits, or Hospital Admissions since your last office visit?  No      Social History     Tobacco Use    Smoking status: Never     Smokeless tobacco: Never   Substance Use Topics    Alcohol use: Yes     Comment: jody             8/17/2023    10:50 AM   Alcohol Use   Prescreen: >3 drinks/day or >7 drinks/week? No     Reviewed orders with patient.  Reviewed health maintenance and updated orders accordingly - Yes  Lab work is in process    Breast Cancer Screening:    FHS-7:       7/25/2021     8:44 AM 9/20/2021     1:50 PM 8/12/2022     1:31 PM 10/4/2022     8:47 AM 8/17/2023    10:52 AM   Breast CA Risk Assessment (FHS-7)   Did any of your first-degree relatives have breast or ovarian cancer? No No No No No   Did any of your relatives have bilateral breast cancer? No No No No No   Did any man in your family have breast cancer? No No No No No   Did any woman in your family have breast and ovarian cancer? No No No No    Did any woman in your family have breast cancer before age 50 y? No No No No No   Do you have 2 or more relatives with breast and/or ovarian cancer? No No No No No   Do you have 2 or more relatives with breast and/or bowel cancer? No No No No No       Mammogram Screening: Recommended mammography every 1-2 years with patient discussion and risk factor consideration  Pertinent mammograms are reviewed under the imaging tab.    History of abnormal Pap smear: NO - age 30-65 PAP every 5 years with negative HPV co-testing recommended      Latest Ref Rng & Units 7/27/2021     7:12 AM 7/20/2018     8:25 AM 7/20/2018     8:20 AM   PAP / HPV   PAP  Negative for Intraepithelial Lesion or Malignancy (NILM)      PAP (Historical)   NIL     HPV 16 DNA Negative Negative   Negative    HPV 18 DNA Negative Negative   Negative    Other HR HPV Negative Negative   Negative      Reviewed and updated as needed this visit by clinical staff   Tobacco  Allergies  Meds  Problems  Med Hx  Surg Hx  Fam Hx          Reviewed and updated as needed this visit by Provider   Tobacco  Allergies  Meds  Problems  Med Hx  Surg Hx  Fam Hx             Review  "of Systems   Constitutional:  Negative for chills and fever.   HENT:  Negative for congestion, ear pain, hearing loss and sore throat.    Eyes:  Negative for pain and visual disturbance.   Respiratory:  Negative for cough and shortness of breath.    Cardiovascular:  Negative for chest pain, palpitations and peripheral edema.   Gastrointestinal:  Negative for abdominal pain, constipation, diarrhea, heartburn, hematochezia and nausea.   Breasts:  Negative for tenderness, breast mass and discharge.   Genitourinary:  Negative for dysuria, frequency, genital sores, hematuria, pelvic pain, urgency, vaginal bleeding and vaginal discharge.   Musculoskeletal:  Negative for arthralgias, joint swelling and myalgias.   Skin:  Negative for rash.   Neurological:  Negative for dizziness, weakness, headaches and paresthesias.   Psychiatric/Behavioral:  Negative for mood changes. The patient is not nervous/anxious.           OBJECTIVE:   /82   Pulse 67   Temp 97.8  F (36.6  C) (Tympanic)   Resp 12   Ht 1.626 m (5' 4\")   Wt 83.5 kg (184 lb)   LMP 03/25/2016   SpO2 99%   BMI 31.58 kg/m    Physical Exam  GENERAL APPEARANCE: healthy, alert and no distress  EYES: Eyes grossly normal to inspection, PERRL and conjunctivae and sclerae normal  HENT: ear canals and TM's normal, nose and mouth without ulcers or lesions, oropharynx clear and oral mucous membranes moist  NECK: no adenopathy, no asymmetry, masses, or scars and thyroid normal to palpation  RESP: lungs clear to auscultation - no rales, rhonchi or wheezes  BREAST: normal without masses, tenderness or nipple discharge and no palpable axillary masses or adenopathy  CV: regular rate and rhythm, normal S1 S2, no S3 or S4, no murmur, click or rub, no peripheral edema and peripheral pulses strong  ABDOMEN: soft, nontender, no hepatosplenomegaly, no masses and bowel sounds normal  MS: no musculoskeletal defects are noted and gait is age appropriate without ataxia  SKIN: no " suspicious lesions or rashes  NEURO: Normal strength and tone, sensory exam grossly normal, mentation intact and speech normal  PSYCH: mentation appears normal and affect normal/bright    Diagnostic Test Results:  Labs reviewed in Epic    ASSESSMENT/PLAN:   (Z00.00) Routine general medical examination at a health care facility  (primary encounter diagnosis)  Comment:   Plan: Comprehensive metabolic panel, Lipid panel         reflex to direct LDL Fasting, CBC with         platelets            (J45.20) Mild intermittent asthma without complication  Comment: Stable no change in treatment plan.   Plan: albuterol (PROAIR HFA/PROVENTIL HFA/VENTOLIN         HFA) 108 (90 Base) MCG/ACT inhaler            (K21.9) Gastroesophageal reflux disease, unspecified whether esophagitis present  Comment: Stable no change in treatment plan.   Plan: omeprazole (PRILOSEC) 20 MG DR capsule            (Z78.0) Postmenopausal status  Comment:   Plan: DX Hip/Pelvis/Spine            Patient has been advised of split billing requirements and indicates understanding: Yes      COUNSELING:  Reviewed preventive health counseling, as reflected in patient instructions        She reports that she has never smoked. She has never used smokeless tobacco.          Deanna Cagle MD  [facility]

## 2023-09-26 ENCOUNTER — HOSPITAL ENCOUNTER (OUTPATIENT)
Dept: BONE DENSITY | Facility: CLINIC | Age: 58
Discharge: HOME | End: 2023-09-26
Attending: FAMILY MEDICINE | Admitting: FAMILY MEDICINE
Payer: COMMERCIAL

## 2023-09-26 DIAGNOSIS — Z78.0 POSTMENOPAUSAL STATUS: ICD-10-CM

## 2023-09-26 PROCEDURE — 77080 DXA BONE DENSITY AXIAL: CPT

## 2023-10-11 ENCOUNTER — ANCILLARY PROCEDURE (OUTPATIENT)
Dept: MAMMOGRAPHY | Facility: CLINIC | Age: 58
End: 2023-10-11
Payer: COMMERCIAL

## 2023-10-11 ENCOUNTER — IMMUNIZATION (OUTPATIENT)
Dept: FAMILY MEDICINE | Facility: CLINIC | Age: 58
End: 2023-10-11
Payer: COMMERCIAL

## 2023-10-11 DIAGNOSIS — Z12.31 VISIT FOR SCREENING MAMMOGRAM: ICD-10-CM

## 2023-10-11 PROCEDURE — 90682 RIV4 VACC RECOMBINANT DNA IM: CPT

## 2023-10-11 PROCEDURE — 77067 SCR MAMMO BI INCL CAD: CPT | Mod: TC | Performed by: RADIOLOGY

## 2023-10-11 PROCEDURE — 77063 BREAST TOMOSYNTHESIS BI: CPT | Mod: TC | Performed by: RADIOLOGY

## 2023-10-11 PROCEDURE — 90471 IMMUNIZATION ADMIN: CPT

## 2023-11-19 ENCOUNTER — HEALTH MAINTENANCE LETTER (OUTPATIENT)
Age: 58
End: 2023-11-19

## 2023-11-21 ENCOUNTER — MYC MEDICAL ADVICE (OUTPATIENT)
Dept: FAMILY MEDICINE | Facility: CLINIC | Age: 58
End: 2023-11-21
Payer: COMMERCIAL

## 2023-12-29 ENCOUNTER — PATIENT OUTREACH (OUTPATIENT)
Dept: GASTROENTEROLOGY | Facility: CLINIC | Age: 58
End: 2023-12-29
Payer: COMMERCIAL

## 2024-02-02 ENCOUNTER — OFFICE VISIT (OUTPATIENT)
Dept: FAMILY MEDICINE | Facility: CLINIC | Age: 59
End: 2024-02-02
Payer: COMMERCIAL

## 2024-02-02 VITALS
SYSTOLIC BLOOD PRESSURE: 130 MMHG | OXYGEN SATURATION: 100 % | HEART RATE: 84 BPM | BODY MASS INDEX: 31.41 KG/M2 | DIASTOLIC BLOOD PRESSURE: 78 MMHG | HEIGHT: 64 IN | WEIGHT: 184 LBS | RESPIRATION RATE: 12 BRPM | TEMPERATURE: 98.4 F

## 2024-02-02 DIAGNOSIS — L57.0 ACTINIC KERATOSIS: ICD-10-CM

## 2024-02-02 DIAGNOSIS — D22.9 ATYPICAL MOLE: Primary | ICD-10-CM

## 2024-02-02 PROCEDURE — 17000 DESTRUCT PREMALG LESION: CPT | Performed by: FAMILY MEDICINE

## 2024-02-02 PROCEDURE — 99213 OFFICE O/P EST LOW 20 MIN: CPT | Mod: 25 | Performed by: FAMILY MEDICINE

## 2024-02-02 ASSESSMENT — ASTHMA QUESTIONNAIRES: ACT_TOTALSCORE: 25

## 2024-02-02 ASSESSMENT — PAIN SCALES - GENERAL: PAINLEVEL: NO PAIN (0)

## 2024-02-02 NOTE — PROGRESS NOTES
"  Assessment & Plan     Atypical mole  Changing color and size   Would like removal   - Adult Dermatology  Referral; Future    Actinic keratosis  Cryotherapy but if this returns would excise   - DESTRUCT BENIGN LESION, UP TO 14            BMI  Estimated body mass index is 31.58 kg/m  as calculated from the following:    Height as of this encounter: 1.626 m (5' 4\").    Weight as of this encounter: 83.5 kg (184 lb).             Oksana Noonan is a 58 year old, presenting for the following health issues:  Derm Problem        2/2/2024     9:41 AM   Additional Questions   Roomed by Veronica HIGGINS   Accompanied by Self         2/2/2024     9:41 AM   Patient Reported Additional Medications   Patient reports taking the following new medications .     History of Present Illness       Reason for visit:  Skin growths on face to be checked out & removed if necessary    She eats 4 or more servings of fruits and vegetables daily.She consumes 0 sweetened beverage(s) daily.She exercises with enough effort to increase her heart rate 20 to 29 minutes per day.  She exercises with enough effort to increase her heart rate 5 days per week.   She is taking medications regularly.     Center of forehead  Round dark brown flat macule that seems to be getting bigger  No bleeding  Larger in summertime      Left side of face - scab that comes off and grows back  Spot on top of forehead/hairline worse in winter - uses Eucerin which helps                Objective    /78   Pulse 84   Temp 98.4  F (36.9  C) (Tympanic)   Resp 12   Ht 1.626 m (5' 4\")   Wt 83.5 kg (184 lb)   LMP 03/25/2016   SpO2 100%   BMI 31.58 kg/m    Body mass index is 31.58 kg/m .  Physical Exam   GENERAL: alert and no distress  SKIN: 4-5mm sl raised dark brown macule center of forehead smooth borders   Left preauricular area flat rough scaly patch   PSYCH: mentation appears normal, affect normal/bright    Treated with cryotherapy in a freeze thaw cycle time " two. patient tolerated the procedure. Basic wound care instructions given.          Signed Electronically by: Deanna Cagle MD

## 2024-02-02 NOTE — NURSING NOTE
"Chief Complaint   Patient presents with    Derm Problem     BP (!) 146/80   Pulse 84   Temp 98.4  F (36.9  C) (Tympanic)   Resp 12   Ht 1.626 m (5' 4\")   Wt 83.5 kg (184 lb)   LMP 03/25/2016   SpO2 100%   BMI 31.58 kg/m   Estimated body mass index is 31.58 kg/m  as calculated from the following:    Height as of this encounter: 1.626 m (5' 4\").    Weight as of this encounter: 83.5 kg (184 lb).  Patient presents to the clinic using No DME      Health Maintenance that is potentially due pending provider review:    Health Maintenance Due   Topic Date Due    ASTHMA ACTION PLAN  08/19/2023    COVID-19 Vaccine (7 - 2023-24 season) 11/21/2023    RSV VACCINE (Pregnancy & 60+) (1 - 1-dose 60+ series) Never done    ASTHMA CONTROL TEST  02/22/2024        n/a          "

## 2024-02-13 ENCOUNTER — OFFICE VISIT (OUTPATIENT)
Dept: DERMATOLOGY | Facility: CLINIC | Age: 59
End: 2024-02-13
Payer: COMMERCIAL

## 2024-02-13 DIAGNOSIS — L81.4 LENTIGO: ICD-10-CM

## 2024-02-13 DIAGNOSIS — L73.8 SENILE SEBACEOUS GLAND HYPERPLASIA: Primary | ICD-10-CM

## 2024-02-13 DIAGNOSIS — L72.0 MILIA: ICD-10-CM

## 2024-02-13 DIAGNOSIS — D18.01 ANGIOMA OF SKIN: ICD-10-CM

## 2024-02-13 PROCEDURE — 99243 OFF/OP CNSLTJ NEW/EST LOW 30: CPT | Performed by: DERMATOLOGY

## 2024-02-13 NOTE — LETTER
2/13/2024         RE: Shaista Villar  [address] [facility] [address]        Dear Colleague,    Thank you for referring your patient, Shaista Villar, to the [facility]. Please see a copy of my visit note below.    Shaista Villar , a 58 year old year old female patient, I was asked to see by Dr. Cagle for spot on forehead.  Patient has no other skin complaints today.  Remainder of the HPI, Meds, PMH, Allergies, FH, and SH was reviewed in chart.      Past Medical History:   Diagnosis Date     ASCUS on Pap smear 04/2005    (negative) HPV     Degeneration of thoracic intervertebral disc 10/12/2022     Mild intermittent asthma      SEASONAL ALLERGIES        Past Surgical History:   Procedure Laterality Date     APPENDECTOMY       COLONOSCOPY  5/12/2014    Procedure: COMBINED COLONOSCOPY, SINGLE BIOPSY/POLYPECTOMY BY BIOPSY;  Surgeon: Ridge Duke MD;  Location: [facility]     ESOPHAGOSCOPY, GASTROSCOPY, DUODENOSCOPY (EGD), COMBINED N/A 10/28/2019    Procedure: ESOPHAGOGASTRODUODENOSCOPY (EGD);  Surgeon: John Vargas MD;  Location: [facility]     SURGICAL HISTORY OF -   1991    Appy.        Family History   Problem Relation Age of Onset     Respiratory Father         COPD     Cancer - colorectal Father 76     Colon Cancer Father      Allergies Brother      Respiratory Brother         asthma     Asthma Brother      Glaucoma Brother         glaucoma     Hypertension Brother      Diabetes Maternal Grandmother      Cardiovascular Maternal Grandfather      Hypertension Paternal Grandmother      Melanoma No family hx of        Social History     Socioeconomic History     Marital status:      Spouse name: Not on file     Number of children: Not on file     Years of education: Not on file     Highest education level: Not on file   Occupational History     Not on file   Tobacco Use     Smoking status: Never     Smokeless tobacco: Never   Vaping Use     Vaping Use: Never used    Substance and Sexual Activity     Alcohol use: Yes     Comment: occas     Drug use: No     Sexual activity: Yes     Partners: Male     Birth control/protection: Post-menopausal, None   Other Topics Concern     Parent/sibling w/ CABG, MI or angioplasty before 65F 55M? No   Social History Narrative     Not on file     Social Determinants of Health     Financial Resource Strain: Low Risk  (1/30/2024)    Financial Resource Strain      Within the past 12 months, have you or your family members you live with been unable to get utilities (heat, electricity) when it was really needed?: No   Food Insecurity: Low Risk  (1/30/2024)    Food Insecurity      Within the past 12 months, did you worry that your food would run out before you got money to buy more?: No      Within the past 12 months, did the food you bought just not last and you didn t have money to get more?: No   Transportation Needs: Low Risk  (1/30/2024)    Transportation Needs      Within the past 12 months, has lack of transportation kept you from medical appointments, getting your medicines, non-medical meetings or appointments, work, or from getting things that you need?: No   Physical Activity: Not on file   Stress: Not on file   Social Connections: Not on file   Interpersonal Safety: Low Risk  (2/2/2024)    Interpersonal Safety      Do you feel physically and emotionally safe where you currently live?: Yes      Within the past 12 months, have you been hit, slapped, kicked or otherwise physically hurt by someone?: No      Within the past 12 months, have you been humiliated or emotionally abused in other ways by your partner or ex-partner?: No   Housing Stability: Low Risk  (1/30/2024)    Housing Stability      Do you have housing? : Yes      Are you worried about losing your housing?: No       Outpatient Encounter Medications as of 2/13/2024   Medication Sig Dispense Refill     albuterol (PROAIR HFA/PROVENTIL HFA/VENTOLIN HFA) 108 (90 Base) MCG/ACT inhaler  Inhale 2 puffs into the lungs every 4 hours as needed for shortness of breath 18 g 11     calcium carbonate (OS-MARLENI) 500 MG tablet Take 1 tablet by mouth 2 times daily       loratadine-pseudoePHEDrine (CLARITIN-D 24-HOUR)  MG 24 hr tablet Take 1 tablet by mouth daily       magnesium 250 MG tablet Take 1 tablet by mouth daily 85 mg       MULTIVITAMIN OR one daily       omeprazole (PRILOSEC) 20 MG DR capsule Take 1 capsule (20 mg) by mouth daily 90 capsule 3     vitamin (B COMPLEX-C) tablet        VITAMIN C 100 MG OR TABS prn       Vitamin D, Cholecalciferol, 1000 UNITS TABS Take 1 tablet by mouth daily       ZINC 10 MG OR TABS prn       No facility-administered encounter medications on file as of 2/13/2024.             Review Of Systems  Skin: As above  Eyes: negative  Ears/Nose/Throat: negative  Respiratory: No shortness of breath, dyspnea on exertion, cough, or hemoptysis  Cardiovascular: negative  Gastrointestinal: negative  Genitourinary: negative  Musculoskeletal: negative  Neurologic: negative  Psychiatric: negative  Hematologic/Lymphatic/Immunologic: negative  Endocrine: negative      O:   NAD, WDWN, Alert & Oriented, Mood & Affect wnl, Vitals stable   General appearance aris ii   Vitals stable   Alert, oriented and in no acute distress   Mid glabella blanchable red papules  Yellow papules on forehead  Brown macules on face  Milia on forehead      Eyes: Conjunctivae/lids:Normal     ENT: Lips, buccal mucosa, tongue: normal    MSK:Normal    Cardiovascular: peripheral edema none    Pulm: Breathing Normal    Neuro/Psych: Orientation:Normal; Mood/Affect:Normal      A/P:  1. Sebaceous hyperplasia, milia , lentigo, angioma,  It was a pleasure speaking to Shaista Villar today.  Previous clinic  notes and pertinent laboratory tests were reviewed prior to Shaista Villar's visit.  Nature and genetics of benign skin lesions dicussed with patient.  Selwyn discussed with patient she declines   Patient encouraged to  perform monthly skin exams.  UV precautions reviewed with patient.  Return to clinic 12 months      Again, thank you for allowing me to participate in the care of your patient.        Sincerely,        Franky Carbajal MD

## 2024-02-13 NOTE — PROGRESS NOTES
Shaista Villar , a 58 year old year old female patient, I was asked to see by Dr. Cagle for spot on forehead.  Patient has no other skin complaints today.  Remainder of the HPI, Meds, PMH, Allergies, FH, and SH was reviewed in chart.      Past Medical History:   Diagnosis Date    ASCUS on Pap smear 04/2005    (negative) HPV    Degeneration of thoracic intervertebral disc 10/12/2022    Mild intermittent asthma     SEASONAL ALLERGIES        Past Surgical History:   Procedure Laterality Date    APPENDECTOMY      COLONOSCOPY  5/12/2014    Procedure: COMBINED COLONOSCOPY, SINGLE BIOPSY/POLYPECTOMY BY BIOPSY;  Surgeon: Ridge Duke MD;  Location: [facility]    ESOPHAGOSCOPY, GASTROSCOPY, DUODENOSCOPY (EGD), COMBINED N/A 10/28/2019    Procedure: ESOPHAGOGASTRODUODENOSCOPY (EGD);  Surgeon: John Vargas MD;  Location: [facility]    SURGICAL HISTORY OF -   1991    Appy.        Family History   Problem Relation Age of Onset    Respiratory Father         COPD    Cancer - colorectal Father 76    Colon Cancer Father     Allergies Brother     Respiratory Brother         asthma    Asthma Brother     Glaucoma Brother         glaucoma    Hypertension Brother     Diabetes Maternal Grandmother     Cardiovascular Maternal Grandfather     Hypertension Paternal Grandmother     Melanoma No family hx of        Social History     Socioeconomic History    Marital status:      Spouse name: Not on file    Number of children: Not on file    Years of education: Not on file    Highest education level: Not on file   Occupational History    Not on file   Tobacco Use    Smoking status: Never    Smokeless tobacco: Never   Vaping Use    Vaping Use: Never used   Substance and Sexual Activity    Alcohol use: Yes     Comment: occas    Drug use: No    Sexual activity: Yes     Partners: Male     Birth control/protection: Post-menopausal, None   Other Topics Concern    Parent/sibling w/ CABG, MI or angioplasty before 65F 55M? No   Social History  Narrative    Not on file     Social Determinants of Health     Financial Resource Strain: Low Risk  (1/30/2024)    Financial Resource Strain     Within the past 12 months, have you or your family members you live with been unable to get utilities (heat, electricity) when it was really needed?: No   Food Insecurity: Low Risk  (1/30/2024)    Food Insecurity     Within the past 12 months, did you worry that your food would run out before you got money to buy more?: No     Within the past 12 months, did the food you bought just not last and you didn t have money to get more?: No   Transportation Needs: Low Risk  (1/30/2024)    Transportation Needs     Within the past 12 months, has lack of transportation kept you from medical appointments, getting your medicines, non-medical meetings or appointments, work, or from getting things that you need?: No   Physical Activity: Not on file   Stress: Not on file   Social Connections: Not on file   Interpersonal Safety: Low Risk  (2/2/2024)    Interpersonal Safety     Do you feel physically and emotionally safe where you currently live?: Yes     Within the past 12 months, have you been hit, slapped, kicked or otherwise physically hurt by someone?: No     Within the past 12 months, have you been humiliated or emotionally abused in other ways by your partner or ex-partner?: No   Housing Stability: Low Risk  (1/30/2024)    Housing Stability     Do you have housing? : Yes     Are you worried about losing your housing?: No       Outpatient Encounter Medications as of 2/13/2024   Medication Sig Dispense Refill    albuterol (PROAIR HFA/PROVENTIL HFA/VENTOLIN HFA) 108 (90 Base) MCG/ACT inhaler Inhale 2 puffs into the lungs every 4 hours as needed for shortness of breath 18 g 11    calcium carbonate (OS-MARLENI) 500 MG tablet Take 1 tablet by mouth 2 times daily      loratadine-pseudoePHEDrine (CLARITIN-D 24-HOUR)  MG 24 hr tablet Take 1 tablet by mouth daily      magnesium 250 MG tablet  Take 1 tablet by mouth daily 85 mg      MULTIVITAMIN OR one daily      omeprazole (PRILOSEC) 20 MG DR capsule Take 1 capsule (20 mg) by mouth daily 90 capsule 3    vitamin (B COMPLEX-C) tablet       VITAMIN C 100 MG OR TABS prn      Vitamin D, Cholecalciferol, 1000 UNITS TABS Take 1 tablet by mouth daily      ZINC 10 MG OR TABS prn       No facility-administered encounter medications on file as of 2/13/2024.             Review Of Systems  Skin: As above  Eyes: negative  Ears/Nose/Throat: negative  Respiratory: No shortness of breath, dyspnea on exertion, cough, or hemoptysis  Cardiovascular: negative  Gastrointestinal: negative  Genitourinary: negative  Musculoskeletal: negative  Neurologic: negative  Psychiatric: negative  Hematologic/Lymphatic/Immunologic: negative  Endocrine: negative      O:   NAD, WDWN, Alert & Oriented, Mood & Affect wnl, Vitals stable   General appearance aris ii   Vitals stable   Alert, oriented and in no acute distress   Mid glabella blanchable red papules  Yellow papules on forehead  Brown macules on face  Milia on forehead      Eyes: Conjunctivae/lids:Normal     ENT: Lips, buccal mucosa, tongue: normal    MSK:Normal    Cardiovascular: peripheral edema none    Pulm: Breathing Normal    Neuro/Psych: Orientation:Normal; Mood/Affect:Normal      A/P:  1. Sebaceous hyperplasia, milia , lentigo, angioma,  It was a pleasure speaking to Shaista Villar today.  Previous clinic  notes and pertinent laboratory tests were reviewed prior to Shaista Villar's visit.  Nature and genetics of benign skin lesions dicussed with patient.  Selwyn discussed with patient she declines   Patient encouraged to perform monthly skin exams.  UV precautions reviewed with patient.  Return to clinic 12 months

## 2024-07-16 ENCOUNTER — OFFICE VISIT (OUTPATIENT)
Dept: DERMATOLOGY | Facility: CLINIC | Age: 59
End: 2024-07-16
Attending: FAMILY MEDICINE
Payer: COMMERCIAL

## 2024-07-16 DIAGNOSIS — L81.4 LENTIGO: ICD-10-CM

## 2024-07-16 DIAGNOSIS — L81.6 POIKILODERMA: ICD-10-CM

## 2024-07-16 DIAGNOSIS — L30.9 DERMATITIS: ICD-10-CM

## 2024-07-16 DIAGNOSIS — D23.9 DERMAL NEVUS: Primary | ICD-10-CM

## 2024-07-16 DIAGNOSIS — D18.01 ANGIOMA OF SKIN: ICD-10-CM

## 2024-07-16 DIAGNOSIS — L82.1 SEBORRHEIC KERATOSES: ICD-10-CM

## 2024-07-16 PROCEDURE — 99243 OFF/OP CNSLTJ NEW/EST LOW 30: CPT | Performed by: DERMATOLOGY

## 2024-07-16 NOTE — PROGRESS NOTES
Shaista Villar , a 58 year old year old female patient, I was asked to see by Dr. Cagle for spot on forehead, face and hands.  Patient has no other skin complaints today.  Remainder of the HPI, Meds, PMH, Allergies, FH, and SH was reviewed in chart.      Past Medical History:   Diagnosis Date    ASCUS on Pap smear 04/2005    (negative) HPV    Degeneration of thoracic intervertebral disc 10/12/2022    Mild intermittent asthma     SEASONAL ALLERGIES        Past Surgical History:   Procedure Laterality Date    APPENDECTOMY      COLONOSCOPY  5/12/2014    Procedure: COMBINED COLONOSCOPY, SINGLE BIOPSY/POLYPECTOMY BY BIOPSY;  Surgeon: Ridge Duke MD;  Location: [facility]    ESOPHAGOSCOPY, GASTROSCOPY, DUODENOSCOPY (EGD), COMBINED N/A 10/28/2019    Procedure: ESOPHAGOGASTRODUODENOSCOPY (EGD);  Surgeon: John Vargas MD;  Location: [facility]    SURGICAL HISTORY OF -   1991    Appy.        Family History   Problem Relation Age of Onset    Respiratory Father         COPD    Cancer - colorectal Father 76    Colon Cancer Father     Allergies Brother     Respiratory Brother         asthma    Asthma Brother     Glaucoma Brother         glaucoma    Hypertension Brother     Diabetes Maternal Grandmother     Cardiovascular Maternal Grandfather     Hypertension Paternal Grandmother     Melanoma No family hx of        Social History     Socioeconomic History    Marital status:      Spouse name: Not on file    Number of children: Not on file    Years of education: Not on file    Highest education level: Not on file   Occupational History    Not on file   Tobacco Use    Smoking status: Never    Smokeless tobacco: Never   Vaping Use    Vaping status: Never Used   Substance and Sexual Activity    Alcohol use: Yes     Comment: occas    Drug use: No    Sexual activity: Yes     Partners: Male     Birth control/protection: Post-menopausal, None   Other Topics Concern    Parent/sibling w/ CABG, MI or angioplasty before 65F 55M? No    Social History Narrative    Not on file     Social Determinants of Health     Financial Resource Strain: Low Risk  (1/30/2024)    Financial Resource Strain     Within the past 12 months, have you or your family members you live with been unable to get utilities (heat, electricity) when it was really needed?: No   Food Insecurity: Low Risk  (1/30/2024)    Food Insecurity     Within the past 12 months, did you worry that your food would run out before you got money to buy more?: No     Within the past 12 months, did the food you bought just not last and you didn t have money to get more?: No   Transportation Needs: Low Risk  (1/30/2024)    Transportation Needs     Within the past 12 months, has lack of transportation kept you from medical appointments, getting your medicines, non-medical meetings or appointments, work, or from getting things that you need?: No   Physical Activity: Not on file   Stress: Not on file   Social Connections: Not on file   Interpersonal Safety: Low Risk  (2/2/2024)    Interpersonal Safety     Do you feel physically and emotionally safe where you currently live?: Yes     Within the past 12 months, have you been hit, slapped, kicked or otherwise physically hurt by someone?: No     Within the past 12 months, have you been humiliated or emotionally abused in other ways by your partner or ex-partner?: No   Housing Stability: Low Risk  (1/30/2024)    Housing Stability     Do you have housing? : Yes     Are you worried about losing your housing?: No       Outpatient Encounter Medications as of 7/16/2024   Medication Sig Dispense Refill    albuterol (PROAIR HFA/PROVENTIL HFA/VENTOLIN HFA) 108 (90 Base) MCG/ACT inhaler Inhale 2 puffs into the lungs every 4 hours as needed for shortness of breath 18 g 11    calcium carbonate (OS-MARLENI) 500 MG tablet Take 1 tablet by mouth 2 times daily      loratadine-pseudoePHEDrine (CLARITIN-D 24-HOUR)  MG 24 hr tablet Take 1 tablet by mouth daily       magnesium 250 MG tablet Take 1 tablet by mouth daily 85 mg      MULTIVITAMIN OR one daily      omeprazole (PRILOSEC) 20 MG DR capsule Take 1 capsule (20 mg) by mouth daily 90 capsule 3    vitamin (B COMPLEX-C) tablet       VITAMIN C 100 MG OR TABS prn      Vitamin D, Cholecalciferol, 1000 UNITS TABS Take 1 tablet by mouth daily      ZINC 10 MG OR TABS prn       No facility-administered encounter medications on file as of 7/16/2024.             Review Of Systems  Skin: As above  Eyes: negative  Ears/Nose/Throat: negative  Respiratory: No shortness of breath, dyspnea on exertion, cough, or hemoptysis  Cardiovascular: negative  Gastrointestinal: negative  Genitourinary: negative  Musculoskeletal: negative  Neurologic: negative  Psychiatric: negative  Hematologic/Lymphatic/Immunologic: negative  Endocrine: negative      O:   NAD, WDWN, Alert & Oriented, Mood & Affect wnl, Vitals stable   General appearance aris ii   Vitals stable   Alert, oriented and in no acute distress   Blanchable red papule on forehead, poikiloderma on face   Stuck on papules and brown macules on trunk and ext and face  Flesh colored papules on face  Scaly excematous patches on forehead    Eyes: Conjunctivae/lids:Normal     ENT: Lips, mucosa: normal    MSK:Normal    Cardiovascular: peripheral edema none    Pulm: Breathing Normal    Neuro/Psych: Orientation:Normal; Mood/Affect:Normal      A/P:  1. Seborrheic keratosis, lentigo, poikiloderma, dermal nevus  2. Dermatitis forehead  Lidex sol prn   Return to clinic 4 months  It was a pleasure speaking to Shaista Villar today.  Previous clinic  notes and pertinent laboratory tests were reviewed prior to Shaista Villar's visit.  Nature and genetics of benign skin lesions dicussed with patient.  Signs and Symptoms of skin cancer discussed with patient.  Patient encouraged to perform monthly skin exams.  UV precautions reviewed with patient.  Return to clinic 12 months

## 2024-07-16 NOTE — LETTER
7/16/2024      Shaista Villar  [address] [facility] [address]      Dear Colleague,    Thank you for referring your patient, Shaista Villar, to the [facility]. Please see a copy of my visit note below.    Shaista Villar , a 58 year old year old female patient, I was asked to see by Dr. Cagle for spot on forehead, face and hands.  Patient has no other skin complaints today.  Remainder of the HPI, Meds, PMH, Allergies, FH, and SH was reviewed in chart.      Past Medical History:   Diagnosis Date     ASCUS on Pap smear 04/2005    (negative) HPV     Degeneration of thoracic intervertebral disc 10/12/2022     Mild intermittent asthma      SEASONAL ALLERGIES        Past Surgical History:   Procedure Laterality Date     APPENDECTOMY       COLONOSCOPY  5/12/2014    Procedure: COMBINED COLONOSCOPY, SINGLE BIOPSY/POLYPECTOMY BY BIOPSY;  Surgeon: Ridge Duke MD;  Location: [facility]     ESOPHAGOSCOPY, GASTROSCOPY, DUODENOSCOPY (EGD), COMBINED N/A 10/28/2019    Procedure: ESOPHAGOGASTRODUODENOSCOPY (EGD);  Surgeon: John Vargas MD;  Location: [facility]     SURGICAL HISTORY OF -   1991    Appy.        Family History   Problem Relation Age of Onset     Respiratory Father         COPD     Cancer - colorectal Father 76     Colon Cancer Father      Allergies Brother      Respiratory Brother         asthma     Asthma Brother      Glaucoma Brother         glaucoma     Hypertension Brother      Diabetes Maternal Grandmother      Cardiovascular Maternal Grandfather      Hypertension Paternal Grandmother      Melanoma No family hx of        Social History     Socioeconomic History     Marital status:      Spouse name: Not on file     Number of children: Not on file     Years of education: Not on file     Highest education level: Not on file   Occupational History     Not on file   Tobacco Use     Smoking status: Never     Smokeless tobacco: Never   Vaping Use     Vaping status: Never Used    Substance and Sexual Activity     Alcohol use: Yes     Comment: occas     Drug use: No     Sexual activity: Yes     Partners: Male     Birth control/protection: Post-menopausal, None   Other Topics Concern     Parent/sibling w/ CABG, MI or angioplasty before 65F 55M? No   Social History Narrative     Not on file     Social Determinants of Health     Financial Resource Strain: Low Risk  (1/30/2024)    Financial Resource Strain      Within the past 12 months, have you or your family members you live with been unable to get utilities (heat, electricity) when it was really needed?: No   Food Insecurity: Low Risk  (1/30/2024)    Food Insecurity      Within the past 12 months, did you worry that your food would run out before you got money to buy more?: No      Within the past 12 months, did the food you bought just not last and you didn t have money to get more?: No   Transportation Needs: Low Risk  (1/30/2024)    Transportation Needs      Within the past 12 months, has lack of transportation kept you from medical appointments, getting your medicines, non-medical meetings or appointments, work, or from getting things that you need?: No   Physical Activity: Not on file   Stress: Not on file   Social Connections: Not on file   Interpersonal Safety: Low Risk  (2/2/2024)    Interpersonal Safety      Do you feel physically and emotionally safe where you currently live?: Yes      Within the past 12 months, have you been hit, slapped, kicked or otherwise physically hurt by someone?: No      Within the past 12 months, have you been humiliated or emotionally abused in other ways by your partner or ex-partner?: No   Housing Stability: Low Risk  (1/30/2024)    Housing Stability      Do you have housing? : Yes      Are you worried about losing your housing?: No       Outpatient Encounter Medications as of 7/16/2024   Medication Sig Dispense Refill     albuterol (PROAIR HFA/PROVENTIL HFA/VENTOLIN HFA) 108 (90 Base) MCG/ACT inhaler  Inhale 2 puffs into the lungs every 4 hours as needed for shortness of breath 18 g 11     calcium carbonate (OS-MARLENI) 500 MG tablet Take 1 tablet by mouth 2 times daily       loratadine-pseudoePHEDrine (CLARITIN-D 24-HOUR)  MG 24 hr tablet Take 1 tablet by mouth daily       magnesium 250 MG tablet Take 1 tablet by mouth daily 85 mg       MULTIVITAMIN OR one daily       omeprazole (PRILOSEC) 20 MG DR capsule Take 1 capsule (20 mg) by mouth daily 90 capsule 3     vitamin (B COMPLEX-C) tablet        VITAMIN C 100 MG OR TABS prn       Vitamin D, Cholecalciferol, 1000 UNITS TABS Take 1 tablet by mouth daily       ZINC 10 MG OR TABS prn       No facility-administered encounter medications on file as of 7/16/2024.             Review Of Systems  Skin: As above  Eyes: negative  Ears/Nose/Throat: negative  Respiratory: No shortness of breath, dyspnea on exertion, cough, or hemoptysis  Cardiovascular: negative  Gastrointestinal: negative  Genitourinary: negative  Musculoskeletal: negative  Neurologic: negative  Psychiatric: negative  Hematologic/Lymphatic/Immunologic: negative  Endocrine: negative      O:   NAD, WDWN, Alert & Oriented, Mood & Affect wnl, Vitals stable   General appearance aris ii   Vitals stable   Alert, oriented and in no acute distress   Blanchable red papule on forehead, poikiloderma on face   Stuck on papules and brown macules on trunk and ext and face  Flesh colored papules on face      Eyes: Conjunctivae/lids:Normal     ENT: Lips, mucosa: normal    MSK:Normal    Cardiovascular: peripheral edema none    Pulm: Breathing Normal    Neuro/Psych: Orientation:Normal; Mood/Affect:Normal      A/P:  1. Seborrheic keratosis, lentigo, poikiloderma, dermal nevus  It was a pleasure speaking to Shaista Villar today.  Previous clinic  notes and pertinent laboratory tests were reviewed prior to Shaista Villar's visit.  Nature and genetics of benign skin lesions dicussed with patient.  Signs and Symptoms of skin  cancer discussed with patient.  Patient encouraged to perform monthly skin exams.  UV precautions reviewed with patient.  Return to clinic 12 months      Again, thank you for allowing me to participate in the care of your patient.        Sincerely,        Franky Carbajal MD

## 2024-07-17 RX ORDER — CLOBETASOL PROPIONATE 0.5 MG/ML
SOLUTION TOPICAL 2 TIMES DAILY
Qty: 100 ML | Refills: 6 | Status: SHIPPED | OUTPATIENT
Start: 2024-07-17

## 2024-07-29 ENCOUNTER — PATIENT OUTREACH (OUTPATIENT)
Dept: GASTROENTEROLOGY | Facility: CLINIC | Age: 59
End: 2024-07-29
Payer: COMMERCIAL

## 2024-07-29 DIAGNOSIS — Z12.11 SPECIAL SCREENING FOR MALIGNANT NEOPLASMS, COLON: Primary | ICD-10-CM

## 2024-07-29 NOTE — PROGRESS NOTES
"Patients last colonoscopy on file was on 10/28/19 and was recommended to repeat in 5 years.  CRC Screening Colonoscopy Referral Review    Patient meets the inclusion criteria for screening colonoscopy standing order.    Ordering/Referring Provider:  Deanna Cagle      BMI: Estimated body mass index is 31.58 kg/m  as calculated from the following:    Height as of 2/2/24: 1.626 m (5' 4\").    Weight as of 2/2/24: 83.5 kg (184 lb).     Sedation:  Does patient have any of the following conditions affecting sedation?  No medical conditions affecting sedation.    Previous Scopes:  Any previous recommendations or follow up needs based on previous scope?  na / No recommendations.    Medical Concerns to Postpone Order:  Does patient have any of the following medical concerns that should postpone/delay colonoscopy referral?  No medical conditions affecting colonoscopy referral.    Final Referral Details:  Based on patient's medical history patient is appropriate for referral order with moderate sedation. If patient's BMI > 50 do not schedule in ASC.  "

## 2024-08-12 ENCOUNTER — DOCUMENTATION ONLY (OUTPATIENT)
Dept: OTHER | Facility: CLINIC | Age: 59
End: 2024-08-12
Payer: COMMERCIAL

## 2024-08-20 SDOH — HEALTH STABILITY: PHYSICAL HEALTH: ON AVERAGE, HOW MANY DAYS PER WEEK DO YOU ENGAGE IN MODERATE TO STRENUOUS EXERCISE (LIKE A BRISK WALK)?: 5 DAYS

## 2024-08-20 SDOH — HEALTH STABILITY: PHYSICAL HEALTH: ON AVERAGE, HOW MANY MINUTES DO YOU ENGAGE IN EXERCISE AT THIS LEVEL?: 20 MIN

## 2024-08-20 ASSESSMENT — ASTHMA QUESTIONNAIRES
QUESTION_2 LAST FOUR WEEKS HOW OFTEN HAVE YOU HAD SHORTNESS OF BREATH: ONCE OR TWICE A WEEK
ACT_TOTALSCORE: 23
QUESTION_5 LAST FOUR WEEKS HOW WOULD YOU RATE YOUR ASTHMA CONTROL: COMPLETELY CONTROLLED
QUESTION_3 LAST FOUR WEEKS HOW OFTEN DID YOUR ASTHMA SYMPTOMS (WHEEZING, COUGHING, SHORTNESS OF BREATH, CHEST TIGHTNESS OR PAIN) WAKE YOU UP AT NIGHT OR EARLIER THAN USUAL IN THE MORNING: NOT AT ALL
ACT_TOTALSCORE: 23
QUESTION_4 LAST FOUR WEEKS HOW OFTEN HAVE YOU USED YOUR RESCUE INHALER OR NEBULIZER MEDICATION (SUCH AS ALBUTEROL): ONCE A WEEK OR LESS
QUESTION_1 LAST FOUR WEEKS HOW MUCH OF THE TIME DID YOUR ASTHMA KEEP YOU FROM GETTING AS MUCH DONE AT WORK, SCHOOL OR AT HOME: NONE OF THE TIME

## 2024-08-20 ASSESSMENT — SOCIAL DETERMINANTS OF HEALTH (SDOH): HOW OFTEN DO YOU GET TOGETHER WITH FRIENDS OR RELATIVES?: TWICE A WEEK

## 2024-08-23 ENCOUNTER — OFFICE VISIT (OUTPATIENT)
Dept: FAMILY MEDICINE | Facility: CLINIC | Age: 59
End: 2024-08-23
Payer: COMMERCIAL

## 2024-08-23 ENCOUNTER — TELEPHONE (OUTPATIENT)
Dept: GASTROENTEROLOGY | Facility: CLINIC | Age: 59
End: 2024-08-23

## 2024-08-23 VITALS
SYSTOLIC BLOOD PRESSURE: 132 MMHG | HEART RATE: 82 BPM | WEIGHT: 173 LBS | TEMPERATURE: 97.8 F | RESPIRATION RATE: 14 BRPM | BODY MASS INDEX: 29.53 KG/M2 | HEIGHT: 64 IN | DIASTOLIC BLOOD PRESSURE: 82 MMHG | OXYGEN SATURATION: 100 %

## 2024-08-23 DIAGNOSIS — K21.9 GASTROESOPHAGEAL REFLUX DISEASE, UNSPECIFIED WHETHER ESOPHAGITIS PRESENT: ICD-10-CM

## 2024-08-23 DIAGNOSIS — D12.5 ADENOMATOUS POLYP OF SIGMOID COLON: ICD-10-CM

## 2024-08-23 DIAGNOSIS — Z00.00 ROUTINE GENERAL MEDICAL EXAMINATION AT A HEALTH CARE FACILITY: Primary | ICD-10-CM

## 2024-08-23 DIAGNOSIS — J45.20 MILD INTERMITTENT ASTHMA WITHOUT COMPLICATION: ICD-10-CM

## 2024-08-23 LAB
ALBUMIN SERPL BCG-MCNC: 4.6 G/DL (ref 3.5–5.2)
ALP SERPL-CCNC: 70 U/L (ref 40–150)
ALT SERPL W P-5'-P-CCNC: 16 U/L (ref 0–50)
ANION GAP SERPL CALCULATED.3IONS-SCNC: 10 MMOL/L (ref 7–15)
AST SERPL W P-5'-P-CCNC: 23 U/L (ref 0–45)
BILIRUB SERPL-MCNC: 0.6 MG/DL
BUN SERPL-MCNC: 9.9 MG/DL (ref 8–23)
CALCIUM SERPL-MCNC: 9.8 MG/DL (ref 8.8–10.4)
CHLORIDE SERPL-SCNC: 102 MMOL/L (ref 98–107)
CHOLEST SERPL-MCNC: 215 MG/DL
CREAT SERPL-MCNC: 0.71 MG/DL (ref 0.51–0.95)
EGFRCR SERPLBLD CKD-EPI 2021: >90 ML/MIN/1.73M2
ERYTHROCYTE [DISTWIDTH] IN BLOOD BY AUTOMATED COUNT: 13 % (ref 10–15)
FASTING STATUS PATIENT QL REPORTED: YES
FASTING STATUS PATIENT QL REPORTED: YES
GLUCOSE SERPL-MCNC: 90 MG/DL (ref 70–99)
HCO3 SERPL-SCNC: 27 MMOL/L (ref 22–29)
HCT VFR BLD AUTO: 45.2 % (ref 35–47)
HDLC SERPL-MCNC: 95 MG/DL
HGB BLD-MCNC: 14.8 G/DL (ref 11.7–15.7)
LDLC SERPL CALC-MCNC: 111 MG/DL
MCH RBC QN AUTO: 31.3 PG (ref 26.5–33)
MCHC RBC AUTO-ENTMCNC: 32.7 G/DL (ref 31.5–36.5)
MCV RBC AUTO: 96 FL (ref 78–100)
NONHDLC SERPL-MCNC: 120 MG/DL
PLATELET # BLD AUTO: 313 10E3/UL (ref 150–450)
POTASSIUM SERPL-SCNC: 4.6 MMOL/L (ref 3.4–5.3)
PROT SERPL-MCNC: 7.6 G/DL (ref 6.4–8.3)
RBC # BLD AUTO: 4.73 10E6/UL (ref 3.8–5.2)
SODIUM SERPL-SCNC: 139 MMOL/L (ref 135–145)
TRIGL SERPL-MCNC: 45 MG/DL
TSH SERPL DL<=0.005 MIU/L-ACNC: 1.62 UIU/ML (ref 0.3–4.2)
VIT B12 SERPL-MCNC: 1303 PG/ML (ref 232–1245)
WBC # BLD AUTO: 5.4 10E3/UL (ref 4–11)

## 2024-08-23 PROCEDURE — 36415 COLL VENOUS BLD VENIPUNCTURE: CPT | Performed by: FAMILY MEDICINE

## 2024-08-23 PROCEDURE — 99396 PREV VISIT EST AGE 40-64: CPT | Performed by: FAMILY MEDICINE

## 2024-08-23 PROCEDURE — 82607 VITAMIN B-12: CPT | Performed by: FAMILY MEDICINE

## 2024-08-23 PROCEDURE — 99214 OFFICE O/P EST MOD 30 MIN: CPT | Mod: 25 | Performed by: FAMILY MEDICINE

## 2024-08-23 PROCEDURE — 84443 ASSAY THYROID STIM HORMONE: CPT | Performed by: FAMILY MEDICINE

## 2024-08-23 PROCEDURE — 80053 COMPREHEN METABOLIC PANEL: CPT | Performed by: FAMILY MEDICINE

## 2024-08-23 PROCEDURE — 85027 COMPLETE CBC AUTOMATED: CPT | Performed by: FAMILY MEDICINE

## 2024-08-23 PROCEDURE — 80061 LIPID PANEL: CPT | Performed by: FAMILY MEDICINE

## 2024-08-23 RX ORDER — ALBUTEROL SULFATE 90 UG/1
2 AEROSOL, METERED RESPIRATORY (INHALATION) EVERY 4 HOURS PRN
Qty: 18 G | Refills: 11 | Status: SHIPPED | OUTPATIENT
Start: 2024-08-23

## 2024-08-23 ASSESSMENT — PAIN SCALES - GENERAL: PAINLEVEL: NO PAIN (0)

## 2024-08-23 NOTE — PATIENT INSTRUCTIONS
Patient Education   Preventive Care Advice   This is general advice given by our system to help you stay healthy. However, your care team may have specific advice just for you. Please talk to your care team about your preventive care needs.  Nutrition  Eat 5 or more servings of fruits and vegetables each day.  Try wheat bread, brown rice and whole grain pasta (instead of white bread, rice, and pasta).  Get enough calcium and vitamin D. Check the label on foods and aim for 100% of the RDA (recommended daily allowance).  Lifestyle  Exercise at least 150 minutes each week  (30 minutes a day, 5 days a week).  Do muscle strengthening activities 2 days a week. These help control your weight and prevent disease.  No smoking.  Wear sunscreen to prevent skin cancer.  Have a dental exam and cleaning every 6 months.  Yearly exams  See your health care team every year to talk about:  Any changes in your health.  Any medicines your care team has prescribed.  Preventive care, family planning, and ways to prevent chronic diseases.  Shots (vaccines)   HPV shots (up to age 26), if you've never had them before.  Hepatitis B shots (up to age 59), if you've never had them before.  COVID-19 shot: Get this shot when it's due.  Flu shot: Get a flu shot every year.  Tetanus shot: Get a tetanus shot every 10 years.  Pneumococcal, hepatitis A, and RSV shots: Ask your care team if you need these based on your risk.  Shingles shot (for age 50 and up)  General health tests  Diabetes screening:  Starting at age 35, Get screened for diabetes at least every 3 years.  If you are younger than age 35, ask your care team if you should be screened for diabetes.  Cholesterol test: At age 39, start having a cholesterol test every 5 years, or more often if advised.  Bone density scan (DEXA): At age 50, ask your care team if you should have this scan for osteoporosis (brittle bones).  Hepatitis C: Get tested at least once in your life.  STIs (sexually  transmitted infections)  Before age 24: Ask your care team if you should be screened for STIs.  After age 24: Get screened for STIs if you're at risk. You are at risk for STIs (including HIV) if:  You are sexually active with more than one person.  You don't use condoms every time.  You or a partner was diagnosed with a sexually transmitted infection.  If you are at risk for HIV, ask about PrEP medicine to prevent HIV.  Get tested for HIV at least once in your life, whether you are at risk for HIV or not.  Cancer screening tests  Cervical cancer screening: If you have a cervix, begin getting regular cervical cancer screening tests starting at age 21.  Breast cancer scan (mammogram): If you've ever had breasts, begin having regular mammograms starting at age 40. This is a scan to check for breast cancer.  Colon cancer screening: It is important to start screening for colon cancer at age 45.  Have a colonoscopy test every 10 years (or more often if you're at risk) Or, ask your provider about stool tests like a FIT test every year or Cologuard test every 3 years.  To learn more about your testing options, visit:   .  For help making a decision, visit:   https://bit.ly/mm62523.  Prostate cancer screening test: If you have a prostate, ask your care team if a prostate cancer screening test (PSA) at age 55 is right for you.  Lung cancer screening: If you are a current or former smoker ages 50 to 80, ask your care team if ongoing lung cancer screenings are right for you.  For informational purposes only. Not to replace the advice of your health care provider. Copyright   2023 [employer]. All rights reserved. Clinically reviewed by the [facility] Transitions Program. [employer] [id number] - REV 01/24.

## 2024-08-23 NOTE — NURSING NOTE
"Chief Complaint   Patient presents with    Physical     BP (!) 136/94   Pulse 82   Temp 97.8  F (36.6  C) (Tympanic)   Resp 14   Ht 1.629 m (5' 4.15\")   Wt 78.5 kg (173 lb)   LMP 03/25/2016   SpO2 100%   BMI 29.56 kg/m   Estimated body mass index is 29.56 kg/m  as calculated from the following:    Height as of this encounter: 1.629 m (5' 4.15\").    Weight as of this encounter: 78.5 kg (173 lb).  Patient presents to the clinic using No DME      Health Maintenance that is potentially due pending provider review:    Health Maintenance Due   Topic Date Due    Pneumococcal Vaccine: Pediatrics (0 to 5 Years) and At-Risk Patients (6 to 64 Years) (1 of 2 - PCV) Never done    ASTHMA ACTION PLAN  08/19/2023    COVID-19 Vaccine (7 - 2023-24 season) 11/21/2023    RSV VACCINE (Pregnancy & 60+) (1 - 1-dose 60+ series) Never done    ANNUAL REVIEW OF HM ORDERS  08/22/2024    YEARLY PREVENTIVE VISIT  08/22/2024        n/a          "

## 2024-08-23 NOTE — PROGRESS NOTES
"Preventive Care Visit  [facility]  Deanna Cagle MD, Family Medicine  Aug 23, 2024      Assessment & Plan     Routine general medical examination at a health care facility    - Lipid panel reflex to direct LDL Fasting; Future  - TSH with free T4 reflex; Future    Adenomatous polyp of sigmoid colon  Will repeat this year her scope     Gastroesophageal reflux disease, unspecified whether esophagitis present  Stable no change in treatment plan.   - CBC with platelets; Future  - Comprehensive metabolic panel; Future  - Vitamin B12; Future  - omeprazole (PRILOSEC) 20 MG DR capsule; Take 1 capsule (20 mg) by mouth daily.    Mild intermittent asthma without complication  Stable no change in treatment plan.   - albuterol (PROAIR HFA/PROVENTIL HFA/VENTOLIN HFA) 108 (90 Base) MCG/ACT inhaler; Inhale 2 puffs into the lungs every 4 hours as needed for shortness of breath.    Patient has been advised of split billing requirements and indicates understanding: Yes        BMI  Estimated body mass index is 29.56 kg/m  as calculated from the following:    Height as of this encounter: 1.629 m (5' 4.15\").    Weight as of this encounter: 78.5 kg (173 lb).   Weight management plan: Discussed healthy diet and exercise guidelines    Counseling  Appropriate preventive services were addressed with this patient via screening, questionnaire, or discussion as appropriate for fall prevention, nutrition, physical activity, Tobacco-use cessation, social engagement, weight loss and cognition.  Checklist reviewing preventive services available has been given to the patient.  Reviewed patient's diet, addressing concerns and/or questions.           Oksana Noonan is a 58 year old, presenting for the following:  Physical        8/23/2024     9:34 AM   Additional Questions   Roomed by Veronica HIGGINS   Accompanied by Self         8/23/2024     9:34 AM   Patient Reported Additional Medications   Patient reports taking the " following new medications .        Health Care Directive  Patient has a Health Care Directive on file  Did not discuss     HPI      Asthma Follow-Up    Was ACT completed today?  Yes        8/20/2024    10:57 AM   ACT Total Scores   ACT TOTAL SCORE (Goal Greater than or Equal to 20) 23   In the past 12 months, how many times did you visit the emergency room for your asthma without being admitted to the hospital? 0   In the past 12 months, how many times were you hospitalized overnight because of your asthma? 0        How many days per week do you miss taking your asthma controller medication?  I do not have an asthma controller medication  Please describe any recent triggers for your asthma: smoke and upper respiratory infections  Have you had any Emergency Room Visits, Urgent Care Visits, or Hospital Admissions since your last office visit?  No      GERD is stable on meds       8/20/2024   General Health   How would you rate your overall physical health? Good   Feel stress (tense, anxious, or unable to sleep) Not at all            8/20/2024   Nutrition   Three or more servings of calcium each day? Yes   Diet: Regular (no restrictions)   How many servings of fruit and vegetables per day? 4 or more   How many sweetened beverages each day? 0-1            8/20/2024   Exercise   Days per week of moderate/strenous exercise 5 days   Average minutes spent exercising at this level 20 min            8/20/2024   Social Factors   Frequency of gathering with friends or relatives Twice a week   Worry food won't last until get money to buy more No   Food not last or not have enough money for food? No   Do you have housing? (Housing is defined as stable permanent housing and does not include staying ouside in a car, in a tent, in an abandoned building, in an overnight shelter, or couch-surfing.) Yes   Are you worried about losing your housing? No   Lack of transportation? No   Unable to get utilities (heat,electricity)? No             8/20/2024   Fall Risk   Fallen 2 or more times in the past year? No   Trouble with walking or balance? No             8/20/2024   Dental   Dentist two times every year? Yes            8/20/2024   TB Screening   Were you born outside of the [place]? No              Today's PHQ-2 Score:       2/1/2024    11:20 AM   PHQ-2 ( 1999 [employer])   Q1: Little interest or pleasure in doing things 0   Q2: Feeling down, depressed or hopeless 0   PHQ-2 Score 0   Q1: Little interest or pleasure in doing things Not at all   Q2: Feeling down, depressed or hopeless Not at all   PHQ-2 Score 0         8/20/2024   Substance Use   Alcohol more than 3/day or more than 7/wk No   Do you use any other substances recreationally? No        Social History     Tobacco Use    Smoking status: Never    Smokeless tobacco: Never   Vaping Use    Vaping status: Never Used   Substance Use Topics    Alcohol use: Yes     Comment: occas    Drug use: No           10/11/2023   LAST FHS-7 RESULTS   1st degree relative breast or ovarian cancer No   Any relative bilateral breast cancer No   Any male have breast cancer No   Any ONE woman have BOTH breast AND ovarian cancer No   Any woman with breast cancer before 50yrs No   2 or more relatives with breast AND/OR ovarian cancer No   2 or more relatives with breast AND/OR bowel cancer No           Mammogram Screening - Mammogram every 1-2 years updated in Health Maintenance based on mutual decision making        8/20/2024   STI Screening   New sexual partner(s) since last STI/HIV test? No        History of abnormal Pap smear: No - age 30- 64 PAP with HPV every 5 years recommended        Latest Ref Rng & Units 7/27/2021     7:12 AM 7/20/2018     8:25 AM 7/20/2018     8:20 AM   PAP / HPV   PAP  Negative for Intraepithelial Lesion or Malignancy (NILM)      PAP (Historical)   NIL     HPV 16 DNA Negative Negative   Negative    HPV 18 DNA Negative Negative   Negative    Other HR HPV Negative Negative   Negative   "    ASCVD Risk   The ASCVD Risk score (Sara BOWIE, et al., 2019) failed to calculate for the following reasons:    The valid HDL cholesterol range is 20 to 100 mg/dL           Reviewed and updated as needed this visit by Provider   Tobacco  Allergies  Meds  Problems  Med Hx  Surg Hx  Fam Hx                  Review of Systems  Constitutional, HEENT, cardiovascular, pulmonary, gi and gu systems are negative, except as otherwise noted.     Objective    Exam  /82   Pulse 82   Temp 97.8  F (36.6  C) (Tympanic)   Resp 14   Ht 1.629 m (5' 4.15\")   Wt 78.5 kg (173 lb)   LMP 03/25/2016   SpO2 100%   BMI 29.56 kg/m     Estimated body mass index is 29.56 kg/m  as calculated from the following:    Height as of this encounter: 1.629 m (5' 4.15\").    Weight as of this encounter: 78.5 kg (173 lb).    Physical Exam  GENERAL: alert and no distress  EYES: Eyes grossly normal to inspection, PERRL and conjunctivae and sclerae normal  HENT: ear canals and TM's normal, nose and mouth without ulcers or lesions  NECK: no adenopathy, no asymmetry, masses, or scars  RESP: lungs clear to auscultation - no rales, rhonchi or wheezes  BREAST: normal without masses, tenderness or nipple discharge and no palpable axillary masses or adenopathy  CV: regular rate and rhythm, normal S1 S2, no S3 or S4, no murmur, click or rub, no peripheral edema  ABDOMEN: soft, nontender, no hepatosplenomegaly, no masses and bowel sounds normal  MS: no gross musculoskeletal defects noted, no edema  SKIN: no suspicious lesions or rashes  NEURO: Normal strength and tone, mentation intact and speech normal  PSYCH: mentation appears normal, affect normal/bright        Signed Electronically by: Deanna Cagle MD    "

## 2024-10-15 ENCOUNTER — ANCILLARY PROCEDURE (OUTPATIENT)
Dept: MAMMOGRAPHY | Facility: CLINIC | Age: 59
End: 2024-10-15
Payer: COMMERCIAL

## 2024-10-15 ENCOUNTER — IMMUNIZATION (OUTPATIENT)
Dept: FAMILY MEDICINE | Facility: CLINIC | Age: 59
End: 2024-10-15
Payer: COMMERCIAL

## 2024-10-15 DIAGNOSIS — Z12.31 VISIT FOR SCREENING MAMMOGRAM: ICD-10-CM

## 2024-10-15 PROCEDURE — 90673 RIV3 VACCINE NO PRESERV IM: CPT

## 2024-10-15 PROCEDURE — 90471 IMMUNIZATION ADMIN: CPT

## 2024-10-15 PROCEDURE — 77063 BREAST TOMOSYNTHESIS BI: CPT | Mod: TC | Performed by: RADIOLOGY

## 2024-10-15 PROCEDURE — 77067 SCR MAMMO BI INCL CAD: CPT | Mod: TC | Performed by: RADIOLOGY

## 2025-01-27 ENCOUNTER — ANESTHESIA EVENT (OUTPATIENT)
Dept: GASTROENTEROLOGY | Facility: CLINIC | Age: 60
End: 2025-01-27
Payer: COMMERCIAL

## 2025-01-27 NOTE — ANESTHESIA PREPROCEDURE EVALUATION
Anesthesia Pre-Procedure Evaluation    Patient: Shaista Villar   MRN: [id number] : 1965        Procedure : Procedure(s):  Colonoscopy          Past Medical History:   Diagnosis Date     ASCUS on Pap smear 2005    (negative) HPV     Degeneration of thoracic intervertebral disc 10/12/2022     Mild intermittent asthma      SEASONAL ALLERGIES       Past Surgical History:   Procedure Laterality Date     APPENDECTOMY       COLONOSCOPY  2014    Procedure: COMBINED COLONOSCOPY, SINGLE BIOPSY/POLYPECTOMY BY BIOPSY;  Surgeon: Ridge Duke MD;  Location: [facility]     ESOPHAGOSCOPY, GASTROSCOPY, DUODENOSCOPY (EGD), COMBINED N/A 10/28/2019    Procedure: ESOPHAGOGASTRODUODENOSCOPY (EGD);  Surgeon: John Vargas MD;  Location: [facility]     SURGICAL HISTORY OF -       Appy.      Allergies   Allergen Reactions     Darvocet [Acetaminophen] Nausea and Vomiting     Erythromycin Nausea and Vomiting     Morphine And Codeine      Tongue swelled     Sulfa Antibiotics Nausea and Vomiting      Social History     Tobacco Use     Smoking status: Never     Smokeless tobacco: Never   Substance Use Topics     Alcohol use: Yes     Comment: occas      Wt Readings from Last 1 Encounters:   24 78.5 kg (173 lb)        Anesthesia Evaluation   Pt has had prior anesthetic. Type: General and MAC.        ROS/MED HX  ENT/Pulmonary:     (+)           allergic rhinitis,          Intermittent, asthma  Treatment: Inhaler prn,                 Neurologic:  - neg neurologic ROS     Cardiovascular:     (+) Dyslipidemia - -   -  - -                                 Previous cardiac testing   Echo: Date: Results:    Stress Test:  Date: Results:    ECG Reviewed:  Date: 3-2022 Results:  Sinus  Rhythm   WITHIN NORMAL LIMITS  Cath:  Date: Results:      METS/Exercise Tolerance:     Hematologic:  - neg hematologic  ROS     Musculoskeletal: Comment: Thoracic DDD  (+)  arthritis,             GI/Hepatic:     (+) GERD, Asymptomatic on medication,      "             Renal/Genitourinary:  - neg Renal ROS     Endo:  - neg endo ROS     Psychiatric/Substance Use:  - neg psychiatric ROS     Infectious Disease:  - neg infectious disease ROS     Malignancy:  - neg malignancy ROS     Other:  - neg other ROS          Physical Exam    Airway        Mallampati: I   TM distance: > 3 FB   Neck ROM: full   Mouth opening: > 3 cm    Respiratory Devices and Support         Dental           Cardiovascular   cardiovascular exam normal          Pulmonary   pulmonary exam normal            OUTSIDE LABS:  CBC:   Lab Results   Component Value Date    WBC 5.4 08/23/2024    WBC 4.6 08/22/2023    HGB 14.8 08/23/2024    HGB 14.3 08/22/2023    HCT 45.2 08/23/2024    HCT 42.1 08/22/2023     08/23/2024     08/22/2023     BMP:   Lab Results   Component Value Date     08/23/2024     08/22/2023    POTASSIUM 4.6 08/23/2024    POTASSIUM 4.2 08/22/2023    CHLORIDE 102 08/23/2024    CHLORIDE 99 08/22/2023    CO2 27 08/23/2024    CO2 27 08/22/2023    BUN 9.9 08/23/2024    BUN 10.3 08/22/2023    CR 0.71 08/23/2024    CR 0.65 08/22/2023    GLC 90 08/23/2024    GLC 90 08/22/2023     COAGS: No results found for: \"PTT\", \"INR\", \"FIBR\"  POC:   Lab Results   Component Value Date    HCG Negative 05/12/2014     HEPATIC:   Lab Results   Component Value Date    ALBUMIN 4.6 08/23/2024    PROTTOTAL 7.6 08/23/2024    ALT 16 08/23/2024    AST 23 08/23/2024    ALKPHOS 70 08/23/2024    BILITOTAL 0.6 08/23/2024     OTHER:   Lab Results   Component Value Date    MARLENI 9.8 08/23/2024    LIPASE 126 03/02/2022    TSH 1.62 08/23/2024       Anesthesia Plan    ASA Status:  2       Anesthesia Type: General.   Induction: Propofol.           Consents    Anesthesia Plan(s) and associated risks, benefits, and realistic alternatives discussed. Questions answered and patient/representative(s) expressed understanding.     - Discussed: Risks, Benefits and Alternatives for BOTH SEDATION and the PROCEDURE were " discussed     - Discussed with:  Patient            Postoperative Care    Pain management: IV analgesics, Oral pain medications, Multi-modal analgesia.   PONV prophylaxis: Ondansetron (or other 5HT-3), Dexamethasone or Solumedrol     Comments:             NICOL Desir CRNA    I have reviewed the pertinent notes and labs in the chart from the past 30 days and (re)examined the patient.  Any updates or changes from those notes are reflected in this note.    Clinically Significant Risk Factors Present on Admission                                 # Asthma: noted on problem list

## 2025-02-03 ENCOUNTER — HOSPITAL ENCOUNTER (OUTPATIENT)
Facility: CLINIC | Age: 60
Discharge: HOME | End: 2025-02-03
Attending: SURGERY | Admitting: SURGERY
Payer: COMMERCIAL

## 2025-02-03 ENCOUNTER — ANESTHESIA (OUTPATIENT)
Dept: GASTROENTEROLOGY | Facility: CLINIC | Age: 60
End: 2025-02-03
Payer: COMMERCIAL

## 2025-02-03 VITALS
TEMPERATURE: 98 F | RESPIRATION RATE: 15 BRPM | OXYGEN SATURATION: 96 % | HEART RATE: 63 BPM | SYSTOLIC BLOOD PRESSURE: 137 MMHG | DIASTOLIC BLOOD PRESSURE: 96 MMHG

## 2025-02-03 LAB — COLONOSCOPY: NORMAL

## 2025-02-03 PROCEDURE — 250N000009 HC RX 250: Performed by: NURSE ANESTHETIST, CERTIFIED REGISTERED

## 2025-02-03 PROCEDURE — 88305 TISSUE EXAM BY PATHOLOGIST: CPT | Mod: TC | Performed by: SURGERY

## 2025-02-03 PROCEDURE — 250N000009 HC RX 250: Performed by: PHYSICIAN ASSISTANT

## 2025-02-03 PROCEDURE — 88305 TISSUE EXAM BY PATHOLOGIST: CPT | Mod: 26 | Performed by: PATHOLOGY

## 2025-02-03 PROCEDURE — 370N000017 HC ANESTHESIA TECHNICAL FEE, PER MIN: Performed by: SURGERY

## 2025-02-03 PROCEDURE — 258N000003 HC RX IP 258 OP 636: Performed by: NURSE ANESTHETIST, CERTIFIED REGISTERED

## 2025-02-03 PROCEDURE — 45385 COLONOSCOPY W/LESION REMOVAL: CPT | Performed by: SURGERY

## 2025-02-03 PROCEDURE — 250N000011 HC RX IP 250 OP 636: Performed by: NURSE ANESTHETIST, CERTIFIED REGISTERED

## 2025-02-03 RX ORDER — GLYCOPYRROLATE 0.2 MG/ML
INJECTION, SOLUTION INTRAMUSCULAR; INTRAVENOUS PRN
Status: DISCONTINUED | OUTPATIENT
Start: 2025-02-03 | End: 2025-02-03

## 2025-02-03 RX ORDER — ONDANSETRON 2 MG/ML
4 INJECTION INTRAMUSCULAR; INTRAVENOUS EVERY 30 MIN PRN
Status: DISCONTINUED | OUTPATIENT
Start: 2025-02-03 | End: 2025-02-03 | Stop reason: HOSPADM

## 2025-02-03 RX ORDER — NALOXONE HYDROCHLORIDE 0.4 MG/ML
0.1 INJECTION, SOLUTION INTRAMUSCULAR; INTRAVENOUS; SUBCUTANEOUS
Status: DISCONTINUED | OUTPATIENT
Start: 2025-02-03 | End: 2025-02-03 | Stop reason: HOSPADM

## 2025-02-03 RX ORDER — PROPOFOL 10 MG/ML
INJECTION, EMULSION INTRAVENOUS PRN
Status: DISCONTINUED | OUTPATIENT
Start: 2025-02-03 | End: 2025-02-03

## 2025-02-03 RX ORDER — LIDOCAINE 40 MG/G
CREAM TOPICAL
Status: DISCONTINUED | OUTPATIENT
Start: 2025-02-03 | End: 2025-02-03 | Stop reason: HOSPADM

## 2025-02-03 RX ORDER — SODIUM CHLORIDE, SODIUM LACTATE, POTASSIUM CHLORIDE, CALCIUM CHLORIDE 600; 310; 30; 20 MG/100ML; MG/100ML; MG/100ML; MG/100ML
INJECTION, SOLUTION INTRAVENOUS CONTINUOUS
Status: DISCONTINUED | OUTPATIENT
Start: 2025-02-03 | End: 2025-02-03 | Stop reason: HOSPADM

## 2025-02-03 RX ORDER — ONDANSETRON 4 MG/1
4 TABLET, ORALLY DISINTEGRATING ORAL EVERY 30 MIN PRN
Status: DISCONTINUED | OUTPATIENT
Start: 2025-02-03 | End: 2025-02-03 | Stop reason: HOSPADM

## 2025-02-03 RX ORDER — ALBUTEROL SULFATE 0.83 MG/ML
2.5 SOLUTION RESPIRATORY (INHALATION) EVERY 4 HOURS PRN
Status: DISCONTINUED | OUTPATIENT
Start: 2025-02-03 | End: 2025-02-03 | Stop reason: HOSPADM

## 2025-02-03 RX ORDER — PROPOFOL 10 MG/ML
INJECTION, EMULSION INTRAVENOUS CONTINUOUS PRN
Status: DISCONTINUED | OUTPATIENT
Start: 2025-02-03 | End: 2025-02-03

## 2025-02-03 RX ORDER — LIDOCAINE HYDROCHLORIDE 20 MG/ML
INJECTION, SOLUTION INFILTRATION; PERINEURAL PRN
Status: DISCONTINUED | OUTPATIENT
Start: 2025-02-03 | End: 2025-02-03

## 2025-02-03 RX ADMIN — PROPOFOL 100 MG: 10 INJECTION, EMULSION INTRAVENOUS at 07:56

## 2025-02-03 RX ADMIN — LIDOCAINE 0.2 ML: 40 CREAM TOPICAL at 07:25

## 2025-02-03 RX ADMIN — SODIUM CHLORIDE, POTASSIUM CHLORIDE, SODIUM LACTATE AND CALCIUM CHLORIDE: 600; 310; 30; 20 INJECTION, SOLUTION INTRAVENOUS at 07:24

## 2025-02-03 RX ADMIN — LIDOCAINE HYDROCHLORIDE 100 MG: 20 INJECTION, SOLUTION INFILTRATION; PERINEURAL at 07:56

## 2025-02-03 RX ADMIN — GLYCOPYRROLATE 0.1 MG: 0.2 INJECTION, SOLUTION INTRAMUSCULAR; INTRAVENOUS at 07:56

## 2025-02-03 RX ADMIN — PROPOFOL 200 MCG/KG/MIN: 10 INJECTION, EMULSION INTRAVENOUS at 07:56

## 2025-02-03 ASSESSMENT — ACTIVITIES OF DAILY LIVING (ADL)
ADLS_ACUITY_SCORE: 41

## 2025-02-03 NOTE — ANESTHESIA POSTPROCEDURE EVALUATION
Patient: Shaista Villar    Procedure: Procedure(s):  COLONOSCOPY, FLEXIBLE, WITH LESION REMOVAL USING SNARE       Anesthesia Type:  General    Note:  Disposition: Outpatient   Postop Pain Control: Uneventful            Sign Out: Well controlled pain   PONV: No   Neuro/Psych: Uneventful            Sign Out: Acceptable/Baseline neuro status   Airway/Respiratory: Uneventful            Sign Out: Acceptable/Baseline resp. status   CV/Hemodynamics: Uneventful            Sign Out: Acceptable CV status; No obvious hypovolemia; No obvious fluid overload   Other NRE: NONE   DID A NON-ROUTINE EVENT OCCUR? No           Last vitals:  Vitals Value Taken Time   /81 02/03/25 0850   Temp 36.7  C (98  F) 02/03/25 0821   Pulse 63 02/03/25 0850   Resp 15 02/03/25 0821   SpO2 98 % 02/03/25 0841   Vitals shown include unfiled device data.    Electronically Signed By: NICOL Hancock CRNA  February 3, 2025  9:04 AM

## 2025-02-03 NOTE — ANESTHESIA CARE TRANSFER NOTE
Patient: Shaista Villar    Procedure: Procedure(s):  COLONOSCOPY, FLEXIBLE, WITH LESION REMOVAL USING SNARE       Diagnosis: Special screening for malignant neoplasm of colon [Z12.11]  Diagnosis Additional Information: No value filed.    Anesthesia Type:   General     Note:    Oropharynx: oropharynx clear of all foreign objects  Level of Consciousness: drowsy  Oxygen Supplementation: nasal cannula    Independent Airway: airway patency satisfactory and stable  Dentition: dentition unchanged  Vital Signs Stable: post-procedure vital signs reviewed and stable  Report to RN Given: handoff report given  Patient transferred to: Phase II    Handoff Report: Identifed the Patient, Identified the Reponsible Provider, Reviewed the pertinent medical history, Discussed the surgical course, Reviewed Intra-OP anesthesia mangement and issues during anesthesia, Set expectations for post-procedure period and Allowed opportunity for questions and acknowledgement of understanding      Vitals:  Vitals Value Taken Time   BP 99/63 02/03/25 0820   Temp     Pulse 72 02/03/25 0820   Resp     SpO2 97 % 02/03/25 0822   Vitals shown include unfiled device data.    Electronically Signed By: NICOL Hancock CRNA  February 3, 2025  8:23 AM

## 2025-02-03 NOTE — H&P
[facility]    Pre-Endoscopy History and Physical     Shaista Villar MRN# [id number]   YOB: 1965 Age: 59 year old     Date of Procedure: 2/3/2025  Primary care provider: Deanna Cagle  Type of Endoscopy: Colonoscopy with possible biopsy, possible polypectomy  Reason for Procedure: hx of colon polyps; famhx of colon cancer  Type of Anesthesia Anticipated: MAC    HPI:    Shaista is a 59 year old female who will be undergoing the above procedure.  Colon 2019 (Aries, Sam); Father with colon ca; no blood thinner    A history and physical has been performed. The patient's medications and allergies have been reviewed. The risks and benefits of the procedure and the sedation options and risks were discussed with the patient.  All questions were answered and informed consent was obtained.      She denies a personal or family history of anesthesia complications or bleeding disorders.     Patient Active Problem List   Diagnosis    Mild intermittent asthma    Contraceptive management    CARDIOVASCULAR SCREENING; LDL GOAL LESS THAN 160    Polyp of colon, adenomatous    Menopause    Chronic seasonal allergic rhinitis due to other allergen    Right-sided chest wall pain    Gastroesophageal reflux disease, esophagitis presence not specified    Degeneration of thoracic intervertebral disc    Primary osteoarthritis of right knee        Past Medical History:   Diagnosis Date    ASCUS on Pap smear 04/2005    (negative) HPV    Degeneration of thoracic intervertebral disc 10/12/2022    Mild intermittent asthma     SEASONAL ALLERGIES         Past Surgical History:   Procedure Laterality Date    APPENDECTOMY      COLONOSCOPY  5/12/2014    Procedure: COMBINED COLONOSCOPY, SINGLE BIOPSY/POLYPECTOMY BY BIOPSY;  Surgeon: Ridge Duke MD;  Location: [facility]    ESOPHAGOSCOPY, GASTROSCOPY, DUODENOSCOPY (EGD), COMBINED N/A 10/28/2019    Procedure: ESOPHAGOGASTRODUODENOSCOPY (EGD);  Surgeon: John Vargas  MD GISELA;  Location: [facility]    SURGICAL HISTORY OF -   1991    Appy.       Social History     Tobacco Use    Smoking status: Never    Smokeless tobacco: Never   Substance Use Topics    Alcohol use: Yes     Comment: occas       Family History   Problem Relation Age of Onset    Respiratory Father         COPD    Cancer - colorectal Father 76    Colon Cancer Father     Allergies Brother     Respiratory Brother         asthma    Asthma Brother     Glaucoma Brother         glaucoma    Hypertension Brother     Diabetes Maternal Grandmother     Cardiovascular Maternal Grandfather     Hypertension Paternal Grandmother     Melanoma No family hx of        Prior to Admission medications    Medication Sig Start Date End Date Taking? Authorizing Provider   albuterol (PROAIR HFA/PROVENTIL HFA/VENTOLIN HFA) 108 (90 Base) MCG/ACT inhaler Inhale 2 puffs into the lungs every 4 hours as needed for shortness of breath. 8/23/24   Deanna Cagle MD   calcium carbonate (OS-MARLENI) 500 MG tablet Take 1 tablet by mouth 2 times daily    Reported, Patient   loratadine-pseudoePHEDrine (CLARITIN-D 24-HOUR)  MG 24 hr tablet Take 1 tablet by mouth daily    Reported, Patient   magnesium 250 MG tablet Take 1 tablet by mouth daily 85 mg    Reported, Patient   MULTIVITAMIN OR one daily    Reported, Patient   omeprazole (PRILOSEC) 20 MG DR capsule Take 1 capsule (20 mg) by mouth daily. 8/23/24   Deanna Cagle MD   vitamin (B COMPLEX-C) tablet     Reported, Patient   VITAMIN C 100 MG OR TABS prn    Reported, Patient   Vitamin D, Cholecalciferol, 1000 UNITS TABS Take 1 tablet by mouth daily    Reported, Patient   ZINC 10 MG OR TABS prn    Reported, Patient       Allergies   Allergen Reactions    Darvocet [Acetaminophen] Nausea and Vomiting    Erythromycin Nausea and Vomiting    Morphine And Codeine      Tongue swelled; CODEINE only     Sulfa Antibiotics Nausea and Vomiting        REVIEW OF SYSTEMS:   5 point ROS negative except as noted  "above in HPI, including Gen., Resp., CV, GI &  system review.    PHYSICAL EXAM:   Temp 98.2  F (36.8  C) (Oral)   Resp 16   LMP 03/25/2016   SpO2 100%  Estimated body mass index is 29.56 kg/m  as calculated from the following:    Height as of 8/23/24: 1.629 m (5' 4.15\").    Weight as of 8/23/24: 78.5 kg (173 lb).   Constitutional: Awake, alert, no acute distress.  Eyes: No scleral icterus.  Conjunctiva are without injection.  ENMT: Mucous membranes moist, dentition and gums are intact.   Neck: Soft, supple, trachea midline.    Endocrine: n/a   Lymphatic: There is no cervical, submandibularadenopathy.  Respiratory: normal effortgs   Cardiovascular: S1, S2  Abdomen: Non-distended, non-tender,  No masses,  Musculoskeletal: No spinal or CVA tenderness. Full range of motion in the upper and lower extremities.    Skin: No skin rashes or lesions to inspection.  No petechia.    Neurologic: alerted and oriented 3x  Psychiatric: The patient's affect is not blunted and mood is appropriate.  DIAGNOSTICS:    Not indicated    IMPRESSION   ASA Class 2 - Mild systemic disease    PLAN:   Plan for Colonoscopy with possible biopsy, possible polypectomy. We discussed the risks, benefits and alternatives and the patient wished to proceed.  Patient is cleared for the above procedure.    The above has been forwarded to the consulting provider.    [facility], [facility]       "

## 2025-02-04 LAB
PATH REPORT.COMMENTS IMP SPEC: NORMAL
PATH REPORT.FINAL DX SPEC: NORMAL
PATH REPORT.GROSS SPEC: NORMAL
PATH REPORT.MICROSCOPIC SPEC OTHER STN: NORMAL
PATH REPORT.RELEVANT HX SPEC: NORMAL
PHOTO IMAGE: NORMAL

## 2025-03-19 ENCOUNTER — TELEPHONE (OUTPATIENT)
Dept: FAMILY MEDICINE | Facility: CLINIC | Age: 60
End: 2025-03-19
Payer: COMMERCIAL

## 2025-03-19 NOTE — TELEPHONE ENCOUNTER
Pt is on the CMA/schedule on Thursday 3/20/25 at 9:30 am - for MMR - no recommendation through [facility] ([place] Immun[facility] [employer]) - please review and add order if required.  Route to PCP for review.  Christie De La Rosa CMA (AAMA)   (aka: Charisse De La Rosa)

## 2025-03-20 ENCOUNTER — ALLIED HEALTH/NURSE VISIT (OUTPATIENT)
Dept: FAMILY MEDICINE | Facility: CLINIC | Age: 60
End: 2025-03-20
Payer: COMMERCIAL

## 2025-03-20 DIAGNOSIS — Z23 IMMUNIZATION DUE: Primary | ICD-10-CM

## 2025-03-20 NOTE — PROGRESS NOTES
Prior to immunization administration, verified patients identity using patient s name and date of birth. Please see Immunization Activity for additional information.     Screening Questionnaire for Adult Immunization    Are you sick today?   No   Do you have allergies to medications, food, a vaccine component or latex?   No   Have you ever had a serious reaction after receiving a vaccination?   No   Do you have a long-term health problem with heart, lung, kidney, or metabolic disease (e.g., diabetes), asthma, a blood disorder, no spleen, complement component deficiency, a cochlear implant, or a spinal fluid leak?  Are you on long-term aspirin therapy?   No   Do you have cancer, leukemia, HIV/AIDS, or any other immune system problem?   No   Do you have a parent, brother, or sister with an immune system problem?   No   In the past 3 months, have you taken medications that affect  your immune system, such as prednisone, other steroids, or anticancer drugs; drugs for the treatment of rheumatoid arthritis, Crohn s disease, or psoriasis; or have you had radiation treatments?   No   Have you had a seizure, or a brain or other nervous system problem?   No   During the past year, have you received a transfusion of blood or blood    products, or been given immune (gamma) globulin or antiviral drug?   No   For women: Are you pregnant or is there a chance you could become       pregnant during the next month?   No   Have you received any vaccinations in the past 4 weeks?   No     Immunization questionnaire answers were all negative.    I have reviewed the following standing orders:   This patient is due and qualifies for the MMR vaccine.    Click here for full standing order document: MMR Adult Standing Order     I have reviewed the vaccines inclusion and exclusion criteria;No concerns regarding eligibility.     Patient instructed to remain in clinic for 15 minutes afterwards, and to report any adverse reactions.     Screening  performed by Paty Greenfield CMA on 3/20/2025 at 9:54 AM.

## 2025-07-23 DIAGNOSIS — K21.9 GASTROESOPHAGEAL REFLUX DISEASE, UNSPECIFIED WHETHER ESOPHAGITIS PRESENT: ICD-10-CM

## 2025-07-23 RX ORDER — OMEPRAZOLE 20 MG/1
20 CAPSULE, DELAYED RELEASE ORAL DAILY
Qty: 90 CAPSULE | Refills: 3 | Status: SHIPPED | OUTPATIENT
Start: 2025-07-23

## 2025-08-22 SDOH — HEALTH STABILITY: PHYSICAL HEALTH: ON AVERAGE, HOW MANY MINUTES DO YOU ENGAGE IN EXERCISE AT THIS LEVEL?: 30 MIN

## 2025-08-22 SDOH — HEALTH STABILITY: PHYSICAL HEALTH: ON AVERAGE, HOW MANY DAYS PER WEEK DO YOU ENGAGE IN MODERATE TO STRENUOUS EXERCISE (LIKE A BRISK WALK)?: 5 DAYS

## 2025-08-22 ASSESSMENT — ASTHMA QUESTIONNAIRES
QUESTION_4 LAST FOUR WEEKS HOW OFTEN HAVE YOU USED YOUR RESCUE INHALER OR NEBULIZER MEDICATION (SUCH AS ALBUTEROL): ONCE A WEEK OR LESS
QUESTION_5 LAST FOUR WEEKS HOW WOULD YOU RATE YOUR ASTHMA CONTROL: COMPLETELY CONTROLLED
QUESTION_3 LAST FOUR WEEKS HOW OFTEN DID YOUR ASTHMA SYMPTOMS (WHEEZING, COUGHING, SHORTNESS OF BREATH, CHEST TIGHTNESS OR PAIN) WAKE YOU UP AT NIGHT OR EARLIER THAN USUAL IN THE MORNING: NOT AT ALL
QUESTION_1 LAST FOUR WEEKS HOW MUCH OF THE TIME DID YOUR ASTHMA KEEP YOU FROM GETTING AS MUCH DONE AT WORK, SCHOOL OR AT HOME: A LITTLE OF THE TIME
ACT_TOTALSCORE: 22
QUESTION_2 LAST FOUR WEEKS HOW OFTEN HAVE YOU HAD SHORTNESS OF BREATH: ONCE OR TWICE A WEEK

## 2025-08-27 ENCOUNTER — OFFICE VISIT (OUTPATIENT)
Dept: FAMILY MEDICINE | Facility: CLINIC | Age: 60
End: 2025-08-27
Attending: FAMILY MEDICINE
Payer: COMMERCIAL

## 2025-08-27 VITALS
TEMPERATURE: 98.4 F | HEART RATE: 80 BPM | BODY MASS INDEX: 30.05 KG/M2 | DIASTOLIC BLOOD PRESSURE: 86 MMHG | WEIGHT: 176 LBS | SYSTOLIC BLOOD PRESSURE: 134 MMHG | RESPIRATION RATE: 14 BRPM | OXYGEN SATURATION: 100 % | HEIGHT: 64 IN

## 2025-08-27 DIAGNOSIS — Z00.00 ROUTINE GENERAL MEDICAL EXAMINATION AT A HEALTH CARE FACILITY: Primary | ICD-10-CM

## 2025-08-27 DIAGNOSIS — J45.20 MILD INTERMITTENT ASTHMA WITHOUT COMPLICATION: ICD-10-CM

## 2025-08-27 LAB
ALBUMIN SERPL BCG-MCNC: 4.4 G/DL (ref 3.5–5.2)
ALP SERPL-CCNC: 68 U/L (ref 40–150)
ALT SERPL W P-5'-P-CCNC: 15 U/L (ref 0–50)
ANION GAP SERPL CALCULATED.3IONS-SCNC: 11 MMOL/L (ref 7–15)
AST SERPL W P-5'-P-CCNC: 25 U/L (ref 0–45)
BILIRUB SERPL-MCNC: 0.7 MG/DL
BUN SERPL-MCNC: 11.9 MG/DL (ref 8–23)
CALCIUM SERPL-MCNC: 9.8 MG/DL (ref 8.8–10.4)
CHLORIDE SERPL-SCNC: 101 MMOL/L (ref 98–107)
CHOLEST SERPL-MCNC: 207 MG/DL
CREAT SERPL-MCNC: 0.65 MG/DL (ref 0.51–0.95)
EGFRCR SERPLBLD CKD-EPI 2021: >90 ML/MIN/1.73M2
ERYTHROCYTE [DISTWIDTH] IN BLOOD BY AUTOMATED COUNT: 12.9 % (ref 10–15)
FASTING STATUS PATIENT QL REPORTED: YES
FASTING STATUS PATIENT QL REPORTED: YES
GLUCOSE SERPL-MCNC: 90 MG/DL (ref 70–99)
HCO3 SERPL-SCNC: 26 MMOL/L (ref 22–29)
HCT VFR BLD AUTO: 41.7 % (ref 35–47)
HDLC SERPL-MCNC: 92 MG/DL
HGB BLD-MCNC: 13.7 G/DL (ref 11.7–15.7)
LDLC SERPL CALC-MCNC: 108 MG/DL
MCH RBC QN AUTO: 31.3 PG (ref 26.5–33)
MCHC RBC AUTO-ENTMCNC: 32.9 G/DL (ref 31.5–36.5)
MCV RBC AUTO: 95.2 FL (ref 78–100)
NONHDLC SERPL-MCNC: 115 MG/DL
PLATELET # BLD AUTO: 318 10E3/UL (ref 150–450)
POTASSIUM SERPL-SCNC: 4.3 MMOL/L (ref 3.4–5.3)
PROT SERPL-MCNC: 7.4 G/DL (ref 6.4–8.3)
RBC # BLD AUTO: 4.38 10E6/UL (ref 3.8–5.2)
SODIUM SERPL-SCNC: 138 MMOL/L (ref 135–145)
TRIGL SERPL-MCNC: 36 MG/DL
TSH SERPL DL<=0.005 MIU/L-ACNC: 1.84 UIU/ML (ref 0.3–4.2)
WBC # BLD AUTO: 5.04 10E3/UL (ref 4–11)

## 2025-08-27 PROCEDURE — 99213 OFFICE O/P EST LOW 20 MIN: CPT | Mod: 25 | Performed by: FAMILY MEDICINE

## 2025-08-27 PROCEDURE — 99396 PREV VISIT EST AGE 40-64: CPT | Mod: 25 | Performed by: FAMILY MEDICINE

## 2025-08-27 PROCEDURE — 90677 PCV20 VACCINE IM: CPT | Performed by: FAMILY MEDICINE

## 2025-08-27 PROCEDURE — 3075F SYST BP GE 130 - 139MM HG: CPT | Performed by: FAMILY MEDICINE

## 2025-08-27 PROCEDURE — 90472 IMMUNIZATION ADMIN EACH ADD: CPT | Performed by: FAMILY MEDICINE

## 2025-08-27 PROCEDURE — 90678 RSV VACC PREF BIVALENT IM: CPT | Performed by: FAMILY MEDICINE

## 2025-08-27 PROCEDURE — 1126F AMNT PAIN NOTED NONE PRSNT: CPT | Performed by: FAMILY MEDICINE

## 2025-08-27 PROCEDURE — 80053 COMPREHEN METABOLIC PANEL: CPT | Performed by: FAMILY MEDICINE

## 2025-08-27 PROCEDURE — G2211 COMPLEX E/M VISIT ADD ON: HCPCS | Performed by: FAMILY MEDICINE

## 2025-08-27 PROCEDURE — 80061 LIPID PANEL: CPT | Performed by: FAMILY MEDICINE

## 2025-08-27 PROCEDURE — 36415 COLL VENOUS BLD VENIPUNCTURE: CPT | Performed by: FAMILY MEDICINE

## 2025-08-27 PROCEDURE — 84443 ASSAY THYROID STIM HORMONE: CPT | Performed by: FAMILY MEDICINE

## 2025-08-27 PROCEDURE — 3079F DIAST BP 80-89 MM HG: CPT | Performed by: FAMILY MEDICINE

## 2025-08-27 PROCEDURE — 85027 COMPLETE CBC AUTOMATED: CPT | Performed by: FAMILY MEDICINE

## 2025-08-27 PROCEDURE — 90471 IMMUNIZATION ADMIN: CPT | Performed by: FAMILY MEDICINE

## 2025-08-27 RX ORDER — ALBUTEROL SULFATE 90 UG/1
2 INHALANT RESPIRATORY (INHALATION) EVERY 4 HOURS PRN
Qty: 18 G | Refills: 11 | Status: SHIPPED | OUTPATIENT
Start: 2025-08-27

## 2025-08-27 ASSESSMENT — PAIN SCALES - GENERAL: PAINLEVEL_OUTOF10: NO PAIN (0)

## (undated) DEVICE — ENDO SNARE EXACTO COLD 9MM LOOP 2.4MMX230CM 00711115

## (undated) RX ORDER — LIDOCAINE HYDROCHLORIDE 10 MG/ML
INJECTION, SOLUTION EPIDURAL; INFILTRATION; INTRACAUDAL; PERINEURAL
Status: DISPENSED
Start: 2025-02-03